# Patient Record
Sex: FEMALE | Race: WHITE | NOT HISPANIC OR LATINO | Employment: PART TIME
[De-identification: names, ages, dates, MRNs, and addresses within clinical notes are randomized per-mention and may not be internally consistent; named-entity substitution may affect disease eponyms.]

---

## 2017-03-07 ENCOUNTER — RECORDS - HEALTHEAST (OUTPATIENT)
Dept: ADMINISTRATIVE | Facility: OTHER | Age: 62
End: 2017-03-07

## 2017-03-08 ENCOUNTER — COMMUNICATION - HEALTHEAST (OUTPATIENT)
Dept: FAMILY MEDICINE | Facility: CLINIC | Age: 62
End: 2017-03-08

## 2017-03-10 ENCOUNTER — OFFICE VISIT - HEALTHEAST (OUTPATIENT)
Dept: FAMILY MEDICINE | Facility: CLINIC | Age: 62
End: 2017-03-10

## 2017-03-10 DIAGNOSIS — Z96.7 FIXATION HARDWARE IN LEG: ICD-10-CM

## 2017-03-10 DIAGNOSIS — K12.0 APHTHOUS ULCER: ICD-10-CM

## 2017-03-10 DIAGNOSIS — S82.409A: ICD-10-CM

## 2017-03-10 DIAGNOSIS — L30.9 DERMATITIS: ICD-10-CM

## 2017-03-10 DIAGNOSIS — Z01.818 PRE-OP EXAMINATION: ICD-10-CM

## 2017-03-10 DIAGNOSIS — Z12.11 SCREEN FOR COLON CANCER: ICD-10-CM

## 2017-03-10 DIAGNOSIS — S82.209A: ICD-10-CM

## 2017-03-10 DIAGNOSIS — Z12.31 VISIT FOR SCREENING MAMMOGRAM: ICD-10-CM

## 2017-03-10 LAB
ATRIAL RATE - MUSE: 72 BPM
DIASTOLIC BLOOD PRESSURE - MUSE: NORMAL MMHG
INTERPRETATION ECG - MUSE: NORMAL
P AXIS - MUSE: 49 DEGREES
PR INTERVAL - MUSE: 152 MS
QRS DURATION - MUSE: 82 MS
QT - MUSE: 402 MS
QTC - MUSE: 440 MS
R AXIS - MUSE: 54 DEGREES
SYSTOLIC BLOOD PRESSURE - MUSE: NORMAL MMHG
T AXIS - MUSE: 63 DEGREES
VENTRICULAR RATE- MUSE: 72 BPM

## 2017-03-10 RX ORDER — IBUPROFEN 200 MG
200 TABLET ORAL EVERY 6 HOURS PRN
Status: SHIPPED | COMMUNITY
Start: 2017-03-10 | End: 2024-02-07

## 2017-03-14 ENCOUNTER — COMMUNICATION - HEALTHEAST (OUTPATIENT)
Dept: FAMILY MEDICINE | Facility: CLINIC | Age: 62
End: 2017-03-14

## 2017-03-15 ENCOUNTER — RECORDS - HEALTHEAST (OUTPATIENT)
Dept: ADMINISTRATIVE | Facility: OTHER | Age: 62
End: 2017-03-15

## 2017-03-22 ENCOUNTER — RECORDS - HEALTHEAST (OUTPATIENT)
Dept: ADMINISTRATIVE | Facility: OTHER | Age: 62
End: 2017-03-22

## 2017-04-04 ENCOUNTER — COMMUNICATION - HEALTHEAST (OUTPATIENT)
Dept: FAMILY MEDICINE | Facility: CLINIC | Age: 62
End: 2017-04-04

## 2017-05-18 ENCOUNTER — RECORDS - HEALTHEAST (OUTPATIENT)
Dept: ADMINISTRATIVE | Facility: OTHER | Age: 62
End: 2017-05-18

## 2017-06-27 ENCOUNTER — TELEPHONE (OUTPATIENT)
Dept: GASTROENTEROLOGY | Facility: CLINIC | Age: 62
End: 2017-06-27

## 2017-07-13 ENCOUNTER — COMMUNICATION - HEALTHEAST (OUTPATIENT)
Dept: FAMILY MEDICINE | Facility: CLINIC | Age: 62
End: 2017-07-13

## 2017-08-22 ENCOUNTER — COMMUNICATION - HEALTHEAST (OUTPATIENT)
Dept: FAMILY MEDICINE | Facility: CLINIC | Age: 62
End: 2017-08-22

## 2018-01-31 ENCOUNTER — COMMUNICATION - HEALTHEAST (OUTPATIENT)
Dept: FAMILY MEDICINE | Facility: CLINIC | Age: 63
End: 2018-01-31

## 2019-08-19 ENCOUNTER — COMMUNICATION - HEALTHEAST (OUTPATIENT)
Dept: FAMILY MEDICINE | Facility: CLINIC | Age: 64
End: 2019-08-19

## 2019-08-19 ENCOUNTER — OFFICE VISIT - HEALTHEAST (OUTPATIENT)
Dept: FAMILY MEDICINE | Facility: CLINIC | Age: 64
End: 2019-08-19

## 2019-08-19 DIAGNOSIS — I73.9 CLAUDICATION (H): ICD-10-CM

## 2019-08-19 DIAGNOSIS — Z00.00 WELL ADULT EXAM: ICD-10-CM

## 2019-08-19 DIAGNOSIS — Z82.49 FAMILY HISTORY OF ISCHEMIC HEART DISEASE: ICD-10-CM

## 2019-08-19 DIAGNOSIS — Z12.31 VISIT FOR SCREENING MAMMOGRAM: ICD-10-CM

## 2019-08-19 DIAGNOSIS — Z12.2 ENCOUNTER FOR SCREENING FOR LUNG CANCER: ICD-10-CM

## 2019-08-19 DIAGNOSIS — Z12.11 SCREEN FOR COLON CANCER: ICD-10-CM

## 2019-08-19 DIAGNOSIS — Z72.0 TOBACCO USE: ICD-10-CM

## 2019-08-19 DIAGNOSIS — L40.3 PUSTULOSIS PALMARIS ET PLANTARIS: ICD-10-CM

## 2019-08-19 LAB
ALBUMIN SERPL-MCNC: 4.4 G/DL (ref 3.5–5)
ALBUMIN UR-MCNC: NEGATIVE MG/DL
ALP SERPL-CCNC: 86 U/L (ref 45–120)
ALT SERPL W P-5'-P-CCNC: 48 U/L (ref 0–45)
ANION GAP SERPL CALCULATED.3IONS-SCNC: 11 MMOL/L (ref 5–18)
APPEARANCE UR: CLEAR
AST SERPL W P-5'-P-CCNC: 42 U/L (ref 0–40)
BACTERIA #/AREA URNS HPF: ABNORMAL HPF
BASOPHILS # BLD AUTO: 0.1 THOU/UL (ref 0–0.2)
BASOPHILS NFR BLD AUTO: 1 % (ref 0–2)
BILIRUB SERPL-MCNC: 0.5 MG/DL (ref 0–1)
BILIRUB UR QL STRIP: NEGATIVE
BUN SERPL-MCNC: 13 MG/DL (ref 8–22)
CALCIUM SERPL-MCNC: 10.4 MG/DL (ref 8.5–10.5)
CHLORIDE BLD-SCNC: 105 MMOL/L (ref 98–107)
CO2 SERPL-SCNC: 24 MMOL/L (ref 22–31)
COLOR UR AUTO: YELLOW
CREAT SERPL-MCNC: 0.81 MG/DL (ref 0.6–1.1)
CRP SERPL HS-MCNC: 3.8 MG/L (ref 0–3)
EOSINOPHIL # BLD AUTO: 0.3 THOU/UL (ref 0–0.4)
EOSINOPHIL NFR BLD AUTO: 3 % (ref 0–6)
ERYTHROCYTE [DISTWIDTH] IN BLOOD BY AUTOMATED COUNT: 12.7 % (ref 11–14.5)
GFR SERPL CREATININE-BSD FRML MDRD: >60 ML/MIN/1.73M2
GLUCOSE BLD-MCNC: 102 MG/DL (ref 70–125)
GLUCOSE UR STRIP-MCNC: NEGATIVE MG/DL
HCT VFR BLD AUTO: 48.4 % (ref 35–47)
HGB BLD-MCNC: 15.6 G/DL (ref 12–16)
HGB UR QL STRIP: ABNORMAL
KETONES UR STRIP-MCNC: NEGATIVE MG/DL
LEUKOCYTE ESTERASE UR QL STRIP: NEGATIVE
LYMPHOCYTES # BLD AUTO: 2.3 THOU/UL (ref 0.8–4.4)
LYMPHOCYTES NFR BLD AUTO: 23 % (ref 20–40)
MCH RBC QN AUTO: 30.3 PG (ref 27–34)
MCHC RBC AUTO-ENTMCNC: 32.2 G/DL (ref 32–36)
MCV RBC AUTO: 94 FL (ref 80–100)
MONOCYTES # BLD AUTO: 0.6 THOU/UL (ref 0–0.9)
MONOCYTES NFR BLD AUTO: 6 % (ref 2–10)
MUCOUS THREADS #/AREA URNS LPF: ABNORMAL LPF
NEUTROPHILS # BLD AUTO: 6.7 THOU/UL (ref 2–7.7)
NEUTROPHILS NFR BLD AUTO: 67 % (ref 50–70)
NITRATE UR QL: NEGATIVE
PH UR STRIP: 5 [PH] (ref 5–8)
PLATELET # BLD AUTO: 296 THOU/UL (ref 140–440)
PMV BLD AUTO: 10.5 FL (ref 8.5–12.5)
POTASSIUM BLD-SCNC: 4.3 MMOL/L (ref 3.5–5)
PROT SERPL-MCNC: 7.7 G/DL (ref 6–8)
RBC # BLD AUTO: 5.15 MILL/UL (ref 3.8–5.4)
RBC #/AREA URNS AUTO: ABNORMAL HPF
SODIUM SERPL-SCNC: 140 MMOL/L (ref 136–145)
SP GR UR STRIP: 1.02 (ref 1–1.03)
SQUAMOUS #/AREA URNS AUTO: ABNORMAL LPF
TSH SERPL DL<=0.005 MIU/L-ACNC: 1.73 UIU/ML (ref 0.3–5)
UROBILINOGEN UR STRIP-ACNC: ABNORMAL
WBC #/AREA URNS AUTO: ABNORMAL HPF
WBC: 10.1 THOU/UL (ref 4–11)

## 2019-08-19 ASSESSMENT — MIFFLIN-ST. JEOR: SCORE: 1224.93

## 2019-08-20 LAB
25(OH)D3 SERPL-MCNC: 24.7 NG/ML (ref 30–80)
FASTING STATUS PATIENT QL REPORTED: YES
HOMOCYSTEINE PLASMA - HISTORICAL: 10 UMOL/L (ref 0–13)

## 2019-08-21 ENCOUNTER — HOSPITAL ENCOUNTER (OUTPATIENT)
Dept: ULTRASOUND IMAGING | Facility: CLINIC | Age: 64
Discharge: HOME | End: 2019-08-21
Attending: FAMILY MEDICINE

## 2019-08-21 DIAGNOSIS — I73.9 CLAUDICATION (H): ICD-10-CM

## 2019-08-22 LAB
CHOLEST SERPL-MCNC: 242 MG/DL
HDL SERPL QN: 8.3 NM
HDL SERPL-SCNC: 37.6 UMOL/L
HDLC SERPL-MCNC: 44 MG/DL (ref 40–59)
HLD.LARGE SERPL-SCNC: <2.8 UMOL/L
LDL SERPL QN: 20.8 NM
LDL SERPL-SCNC: 2176 NMOL/L
LDL SMALL SERPL-SCNC: 889 NMOL/L
LDLC SERPL CALC-MCNC: 160 MG/DL
LIPOPROTEIN (A) - HISTORICAL: <2 MG/DL (ref 0–30)
PATHOLOGY STUDY: ABNORMAL
TRIGL SERPL-MCNC: 192 MG/DL (ref 30–149)
VLDL LARGE SERPL-SCNC: 11.2 NMOL/L
VLDL SERPL QN: 52.7 NM

## 2019-08-23 ENCOUNTER — AMBULATORY - HEALTHEAST (OUTPATIENT)
Dept: FAMILY MEDICINE | Facility: CLINIC | Age: 64
End: 2019-08-23

## 2019-08-23 ENCOUNTER — COMMUNICATION - HEALTHEAST (OUTPATIENT)
Dept: FAMILY MEDICINE | Facility: CLINIC | Age: 64
End: 2019-08-23

## 2019-08-23 DIAGNOSIS — R31.29 MICROHEMATURIA: ICD-10-CM

## 2019-08-23 DIAGNOSIS — I73.9 CLAUDICATION (H): ICD-10-CM

## 2019-08-23 DIAGNOSIS — E78.5 DYSLIPIDEMIA: ICD-10-CM

## 2019-08-23 DIAGNOSIS — Z72.0 TOBACCO USE: ICD-10-CM

## 2019-08-26 ENCOUNTER — COMMUNICATION - HEALTHEAST (OUTPATIENT)
Dept: FAMILY MEDICINE | Facility: CLINIC | Age: 64
End: 2019-08-26

## 2019-08-27 ENCOUNTER — HOSPITAL ENCOUNTER (OUTPATIENT)
Dept: CT IMAGING | Facility: HOSPITAL | Age: 64
Discharge: HOME | End: 2019-08-27
Attending: FAMILY MEDICINE

## 2019-08-27 ENCOUNTER — COMMUNICATION - HEALTHEAST (OUTPATIENT)
Dept: FAMILY MEDICINE | Facility: CLINIC | Age: 64
End: 2019-08-27

## 2019-08-27 DIAGNOSIS — Z12.2 ENCOUNTER FOR SCREENING FOR LUNG CANCER: ICD-10-CM

## 2019-08-27 DIAGNOSIS — Z72.0 TOBACCO USE: ICD-10-CM

## 2019-08-28 ENCOUNTER — RECORDS - HEALTHEAST (OUTPATIENT)
Dept: ADMINISTRATIVE | Facility: OTHER | Age: 64
End: 2019-08-28

## 2019-08-28 ENCOUNTER — COMMUNICATION - HEALTHEAST (OUTPATIENT)
Dept: FAMILY MEDICINE | Facility: CLINIC | Age: 64
End: 2019-08-28

## 2019-09-04 ENCOUNTER — OFFICE VISIT - HEALTHEAST (OUTPATIENT)
Dept: VASCULAR SURGERY | Facility: CLINIC | Age: 64
End: 2019-09-04

## 2019-09-04 ENCOUNTER — RECORDS - HEALTHEAST (OUTPATIENT)
Dept: VASCULAR ULTRASOUND | Facility: CLINIC | Age: 64
End: 2019-09-04

## 2019-09-04 ENCOUNTER — COMMUNICATION - HEALTHEAST (OUTPATIENT)
Dept: VASCULAR SURGERY | Facility: CLINIC | Age: 64
End: 2019-09-04

## 2019-09-04 DIAGNOSIS — Z72.0 TOBACCO USE: ICD-10-CM

## 2019-09-04 DIAGNOSIS — I73.9 CLAUDICATION (H): ICD-10-CM

## 2019-09-04 DIAGNOSIS — I73.9 PERIPHERAL VASCULAR DISEASE, UNSPECIFIED (H): ICD-10-CM

## 2019-09-13 ENCOUNTER — COMMUNICATION - HEALTHEAST (OUTPATIENT)
Dept: VASCULAR SURGERY | Facility: CLINIC | Age: 64
End: 2019-09-13

## 2019-09-13 ENCOUNTER — RECORDS - HEALTHEAST (OUTPATIENT)
Dept: ADMINISTRATIVE | Facility: OTHER | Age: 64
End: 2019-09-13

## 2019-09-13 DIAGNOSIS — I73.9 CLAUDICATION (H): ICD-10-CM

## 2019-09-13 RX ORDER — CLOPIDOGREL BISULFATE 75 MG/1
75 TABLET ORAL DAILY
Qty: 90 TABLET | Refills: 0 | Status: SHIPPED | OUTPATIENT
Start: 2019-09-13 | End: 2022-04-01

## 2019-09-16 ENCOUNTER — HOSPITAL ENCOUNTER (OUTPATIENT)
Dept: MAMMOGRAPHY | Facility: CLINIC | Age: 64
Discharge: HOME | End: 2019-09-16
Attending: FAMILY MEDICINE

## 2019-09-16 DIAGNOSIS — Z12.31 VISIT FOR SCREENING MAMMOGRAM: ICD-10-CM

## 2019-09-17 ENCOUNTER — HOSPITAL ENCOUNTER (OUTPATIENT)
Dept: INTERVENTIONAL RADIOLOGY/VASCULAR | Facility: CLINIC | Age: 64
Discharge: HOME | End: 2019-09-17
Attending: SURGERY | Admitting: SURGERY

## 2019-09-17 ENCOUNTER — HOSPITAL ENCOUNTER (OUTPATIENT)
Dept: CT IMAGING | Facility: CLINIC | Age: 64
Discharge: HOME | End: 2019-09-17
Attending: SURGERY

## 2019-09-17 DIAGNOSIS — I73.9 CLAUDICATION (H): ICD-10-CM

## 2019-09-17 DIAGNOSIS — R10.31 RIGHT INGUINAL PAIN: ICD-10-CM

## 2019-09-17 LAB
ACT BLD: 138 SECONDS (ref 105–167)
ACT BLD: 251 SECONDS (ref 105–167)
ACT BLD: 284 SECONDS (ref 105–167)
CREAT SERPL-MCNC: 0.82 MG/DL (ref 0.6–1.1)
GFR SERPL CREATININE-BSD FRML MDRD: >60 ML/MIN/1.73M2
HGB BLD-MCNC: 14.3 G/DL (ref 12–16)
INR PPP: 0.84 (ref 0.9–1.1)
PLATELET # BLD AUTO: 280 THOU/UL (ref 140–440)
POTASSIUM BLD-SCNC: 4.4 MMOL/L (ref 3.5–5)

## 2019-09-17 ASSESSMENT — MIFFLIN-ST. JEOR: SCORE: 1216.99

## 2019-09-18 ENCOUNTER — COMMUNICATION - HEALTHEAST (OUTPATIENT)
Dept: FAMILY MEDICINE | Facility: CLINIC | Age: 64
End: 2019-09-18

## 2019-09-20 ENCOUNTER — COMMUNICATION - HEALTHEAST (OUTPATIENT)
Dept: VASCULAR SURGERY | Facility: CLINIC | Age: 64
End: 2019-09-20

## 2019-09-23 ENCOUNTER — HOSPITAL ENCOUNTER (OUTPATIENT)
Dept: CT IMAGING | Facility: HOSPITAL | Age: 64
Discharge: HOME | End: 2019-09-23
Attending: UROLOGY

## 2019-09-23 ENCOUNTER — COMMUNICATION - HEALTHEAST (OUTPATIENT)
Dept: INTERVENTIONAL RADIOLOGY/VASCULAR | Facility: CLINIC | Age: 64
End: 2019-09-23

## 2019-09-23 DIAGNOSIS — R31.29 HEMATURIA, MICROSCOPIC: ICD-10-CM

## 2019-09-30 ENCOUNTER — RECORDS - HEALTHEAST (OUTPATIENT)
Dept: ADMINISTRATIVE | Facility: OTHER | Age: 64
End: 2019-09-30

## 2019-10-01 ENCOUNTER — RECORDS - HEALTHEAST (OUTPATIENT)
Dept: VASCULAR ULTRASOUND | Facility: CLINIC | Age: 64
End: 2019-10-01

## 2019-10-01 DIAGNOSIS — I73.9 PERIPHERAL VASCULAR DISEASE, UNSPECIFIED (H): ICD-10-CM

## 2019-10-02 ENCOUNTER — OFFICE VISIT - HEALTHEAST (OUTPATIENT)
Dept: VASCULAR SURGERY | Facility: CLINIC | Age: 64
End: 2019-10-02

## 2019-10-02 DIAGNOSIS — I73.9 CLAUDICATION (H): ICD-10-CM

## 2019-10-02 ASSESSMENT — MIFFLIN-ST. JEOR: SCORE: 1215.86

## 2019-10-31 ENCOUNTER — RECORDS - HEALTHEAST (OUTPATIENT)
Dept: ADMINISTRATIVE | Facility: OTHER | Age: 64
End: 2019-10-31

## 2019-11-22 ENCOUNTER — OFFICE VISIT - HEALTHEAST (OUTPATIENT)
Dept: FAMILY MEDICINE | Facility: CLINIC | Age: 64
End: 2019-11-22

## 2019-11-22 DIAGNOSIS — I70.222 ATHEROSCLEROSIS OF NATIVE ARTERY OF LEFT LOWER EXTREMITY WITH REST PAIN (H): ICD-10-CM

## 2019-11-22 DIAGNOSIS — J41.8 MIXED SIMPLE AND MUCOPURULENT CHRONIC BRONCHITIS (H): ICD-10-CM

## 2019-11-22 DIAGNOSIS — R09.89 BRUIT OF RIGHT CAROTID ARTERY: ICD-10-CM

## 2019-11-22 DIAGNOSIS — I25.10 CORONARY ARTERY CALCIFICATION SEEN ON CAT SCAN: ICD-10-CM

## 2019-11-22 DIAGNOSIS — I73.9 CLAUDICATION (H): ICD-10-CM

## 2019-11-22 DIAGNOSIS — E78.5 DYSLIPIDEMIA: ICD-10-CM

## 2019-11-22 DIAGNOSIS — R79.82 ELEVATED C-REACTIVE PROTEIN (CRP): ICD-10-CM

## 2019-11-22 DIAGNOSIS — L40.8 LOCALIZED PUSTULAR PSORIASIS: ICD-10-CM

## 2019-11-22 DIAGNOSIS — F17.200 SMOKING ADDICTION: ICD-10-CM

## 2019-11-22 ASSESSMENT — MIFFLIN-ST. JEOR: SCORE: 1219.26

## 2019-11-25 ENCOUNTER — HOSPITAL ENCOUNTER (OUTPATIENT)
Dept: ULTRASOUND IMAGING | Facility: HOSPITAL | Age: 64
Discharge: HOME | End: 2019-11-25
Attending: FAMILY MEDICINE

## 2019-11-25 DIAGNOSIS — R09.89 BRUIT OF RIGHT CAROTID ARTERY: ICD-10-CM

## 2019-11-26 ENCOUNTER — AMBULATORY - HEALTHEAST (OUTPATIENT)
Dept: LAB | Facility: CLINIC | Age: 64
End: 2019-11-26

## 2019-11-26 DIAGNOSIS — E78.5 DYSLIPIDEMIA: ICD-10-CM

## 2019-11-26 DIAGNOSIS — I70.222 ATHEROSCLEROSIS OF NATIVE ARTERY OF LEFT LOWER EXTREMITY WITH REST PAIN (H): ICD-10-CM

## 2019-11-26 LAB
ALBUMIN SERPL-MCNC: 4.6 G/DL (ref 3.5–5)
ALP SERPL-CCNC: 74 U/L (ref 45–120)
ALT SERPL W P-5'-P-CCNC: 36 U/L (ref 0–45)
ANION GAP SERPL CALCULATED.3IONS-SCNC: 10 MMOL/L (ref 5–18)
AST SERPL W P-5'-P-CCNC: 35 U/L (ref 0–40)
BILIRUB SERPL-MCNC: 0.5 MG/DL (ref 0–1)
BUN SERPL-MCNC: 15 MG/DL (ref 8–22)
CALCIUM SERPL-MCNC: 9.9 MG/DL (ref 8.5–10.5)
CHLORIDE BLD-SCNC: 103 MMOL/L (ref 98–107)
CHOLEST SERPL-MCNC: 142 MG/DL
CO2 SERPL-SCNC: 26 MMOL/L (ref 22–31)
CREAT SERPL-MCNC: 0.86 MG/DL (ref 0.6–1.1)
CRP SERPL HS-MCNC: 1.6 MG/L (ref 0–3)
FASTING STATUS PATIENT QL REPORTED: YES
GFR SERPL CREATININE-BSD FRML MDRD: >60 ML/MIN/1.73M2
GLUCOSE BLD-MCNC: 80 MG/DL (ref 70–125)
HDLC SERPL-MCNC: 51 MG/DL
LDLC SERPL CALC-MCNC: 70 MG/DL
POTASSIUM BLD-SCNC: 4.2 MMOL/L (ref 3.5–5)
PROT SERPL-MCNC: 7.5 G/DL (ref 6–8)
SODIUM SERPL-SCNC: 139 MMOL/L (ref 136–145)
TRIGL SERPL-MCNC: 104 MG/DL

## 2019-11-27 ENCOUNTER — COMMUNICATION - HEALTHEAST (OUTPATIENT)
Dept: FAMILY MEDICINE | Facility: CLINIC | Age: 64
End: 2019-11-27

## 2019-12-17 ENCOUNTER — COMMUNICATION - HEALTHEAST (OUTPATIENT)
Dept: SCHEDULING | Facility: CLINIC | Age: 64
End: 2019-12-17

## 2019-12-26 ENCOUNTER — AMBULATORY - HEALTHEAST (OUTPATIENT)
Dept: FAMILY MEDICINE | Facility: CLINIC | Age: 64
End: 2019-12-26

## 2019-12-26 DIAGNOSIS — E78.5 DYSLIPIDEMIA: ICD-10-CM

## 2019-12-26 DIAGNOSIS — R79.82 ELEVATED C-REACTIVE PROTEIN (CRP): ICD-10-CM

## 2019-12-26 DIAGNOSIS — I70.222 ATHEROSCLEROSIS OF NATIVE ARTERY OF LEFT LOWER EXTREMITY WITH REST PAIN (H): ICD-10-CM

## 2019-12-26 DIAGNOSIS — R06.09 OTHER FORM OF DYSPNEA: ICD-10-CM

## 2019-12-26 DIAGNOSIS — F17.200 SMOKING ADDICTION: ICD-10-CM

## 2019-12-30 ENCOUNTER — HOSPITAL ENCOUNTER (OUTPATIENT)
Dept: NUCLEAR MEDICINE | Facility: CLINIC | Age: 64
Discharge: HOME | End: 2019-12-30
Attending: FAMILY MEDICINE

## 2019-12-30 ENCOUNTER — HOSPITAL ENCOUNTER (OUTPATIENT)
Dept: CARDIOLOGY | Facility: CLINIC | Age: 64
Discharge: HOME | End: 2019-12-30
Attending: FAMILY MEDICINE

## 2019-12-30 ENCOUNTER — COMMUNICATION - HEALTHEAST (OUTPATIENT)
Dept: FAMILY MEDICINE | Facility: CLINIC | Age: 64
End: 2019-12-30

## 2019-12-30 DIAGNOSIS — F17.200 SMOKING ADDICTION: ICD-10-CM

## 2019-12-30 DIAGNOSIS — R06.09 OTHER FORM OF DYSPNEA: ICD-10-CM

## 2019-12-30 DIAGNOSIS — E78.5 DYSLIPIDEMIA: ICD-10-CM

## 2019-12-30 DIAGNOSIS — R79.82 ELEVATED C-REACTIVE PROTEIN (CRP): ICD-10-CM

## 2019-12-30 DIAGNOSIS — I70.222 ATHEROSCLEROSIS OF NATIVE ARTERY OF LEFT LOWER EXTREMITY WITH REST PAIN (H): ICD-10-CM

## 2019-12-30 LAB
CV STRESS CURRENT BP HE: NORMAL
CV STRESS CURRENT HR HE: 106
CV STRESS CURRENT HR HE: 107
CV STRESS CURRENT HR HE: 112
CV STRESS CURRENT HR HE: 117
CV STRESS CURRENT HR HE: 73
CV STRESS CURRENT HR HE: 73
CV STRESS CURRENT HR HE: 79
CV STRESS CURRENT HR HE: 80
CV STRESS CURRENT HR HE: 81
CV STRESS CURRENT HR HE: 87
CV STRESS CURRENT HR HE: 91
CV STRESS CURRENT HR HE: 95
CV STRESS CURRENT HR HE: 96
CV STRESS CURRENT HR HE: 99
CV STRESS CURRENT HR HE: 99
CV STRESS DEVIATION TIME HE: NORMAL
CV STRESS ECHO PERCENT HR HE: NORMAL
CV STRESS EXERCISE STAGE HE: NORMAL
CV STRESS FINAL RESTING BP HE: NORMAL
CV STRESS FINAL RESTING HR HE: 91
CV STRESS MAX HR HE: 117
CV STRESS MAX TREADMILL GRADE HE: 0
CV STRESS MAX TREADMILL SPEED HE: 0
CV STRESS PEAK DIA BP HE: NORMAL
CV STRESS PEAK SYS BP HE: NORMAL
CV STRESS PHASE HE: NORMAL
CV STRESS PROTOCOL HE: NORMAL
CV STRESS RESTING PT POSITION HE: NORMAL
CV STRESS ST DEVIATION AMOUNT HE: NORMAL
CV STRESS ST DEVIATION ELEVATION HE: NORMAL
CV STRESS ST EVELATION AMOUNT HE: NORMAL
CV STRESS TEST TYPE HE: NORMAL
CV STRESS TOTAL STAGE TIME MIN 1 HE: NORMAL
NUC STRESS EJECTION FRACTION: 54 %
RATE PRESSURE PRODUCT: NORMAL
STRESS ECHO BASELINE DIASTOLIC HE: 93
STRESS ECHO BASELINE HR: 74
STRESS ECHO BASELINE SYSTOLIC BP: 148
STRESS ECHO CALCULATED PERCENT HR: 74 %
STRESS ECHO LAST STRESS DIASTOLIC BP: 82
STRESS ECHO LAST STRESS HR: 99
STRESS ECHO LAST STRESS SYSTOLIC BP: 185
STRESS ECHO TARGET HR: 158

## 2020-03-05 ENCOUNTER — COMMUNICATION - HEALTHEAST (OUTPATIENT)
Dept: VASCULAR SURGERY | Facility: CLINIC | Age: 65
End: 2020-03-05

## 2020-09-06 ENCOUNTER — COMMUNICATION - HEALTHEAST (OUTPATIENT)
Dept: FAMILY MEDICINE | Facility: CLINIC | Age: 65
End: 2020-09-06

## 2020-09-06 DIAGNOSIS — E78.5 DYSLIPIDEMIA: ICD-10-CM

## 2020-09-06 DIAGNOSIS — Z72.0 TOBACCO USE: ICD-10-CM

## 2020-09-06 DIAGNOSIS — I73.9 CLAUDICATION (H): ICD-10-CM

## 2020-09-10 ENCOUNTER — COMMUNICATION - HEALTHEAST (OUTPATIENT)
Dept: PULMONOLOGY | Facility: OTHER | Age: 65
End: 2020-09-10

## 2020-10-14 ENCOUNTER — COMMUNICATION - HEALTHEAST (OUTPATIENT)
Dept: PULMONOLOGY | Facility: OTHER | Age: 65
End: 2020-10-14

## 2021-01-17 ENCOUNTER — COMMUNICATION - HEALTHEAST (OUTPATIENT)
Dept: FAMILY MEDICINE | Facility: CLINIC | Age: 66
End: 2021-01-17

## 2021-01-17 DIAGNOSIS — I73.9 CLAUDICATION (H): ICD-10-CM

## 2021-01-17 DIAGNOSIS — Z72.0 TOBACCO USE: ICD-10-CM

## 2021-01-17 DIAGNOSIS — E78.5 DYSLIPIDEMIA: ICD-10-CM

## 2021-05-16 ENCOUNTER — COMMUNICATION - HEALTHEAST (OUTPATIENT)
Dept: FAMILY MEDICINE | Facility: CLINIC | Age: 66
End: 2021-05-16

## 2021-05-16 DIAGNOSIS — Z72.0 TOBACCO USE: ICD-10-CM

## 2021-05-16 DIAGNOSIS — E78.5 DYSLIPIDEMIA: ICD-10-CM

## 2021-05-16 DIAGNOSIS — I73.9 CLAUDICATION (H): ICD-10-CM

## 2021-05-16 RX ORDER — ROSUVASTATIN CALCIUM 10 MG/1
TABLET, COATED ORAL
Qty: 30 TABLET | Refills: 3 | Status: SHIPPED | OUTPATIENT
Start: 2021-05-16 | End: 2022-04-01

## 2021-05-25 ENCOUNTER — RECORDS - HEALTHEAST (OUTPATIENT)
Dept: ADMINISTRATIVE | Facility: CLINIC | Age: 66
End: 2021-05-25

## 2021-05-26 ENCOUNTER — RECORDS - HEALTHEAST (OUTPATIENT)
Dept: ADMINISTRATIVE | Facility: CLINIC | Age: 66
End: 2021-05-26

## 2021-05-28 ENCOUNTER — RECORDS - HEALTHEAST (OUTPATIENT)
Dept: ADMINISTRATIVE | Facility: CLINIC | Age: 66
End: 2021-05-28

## 2021-05-30 ENCOUNTER — HEALTH MAINTENANCE LETTER (OUTPATIENT)
Age: 66
End: 2021-05-30

## 2021-05-30 ENCOUNTER — RECORDS - HEALTHEAST (OUTPATIENT)
Dept: ADMINISTRATIVE | Facility: CLINIC | Age: 66
End: 2021-05-30

## 2021-05-30 VITALS — BODY MASS INDEX: 25.88 KG/M2 | WEIGHT: 150.75 LBS

## 2021-05-31 NOTE — TELEPHONE ENCOUNTER
"8-19-19  Marysol uLu, say there is a order for Amirah that was out in for \"AMB REFERRAL FOR LUNG CANCER SCREENING\" Radiology wont schedule with the way it was put in, can you put it in for \"CT low dose lung screening\"  Thanks  Carmela"

## 2021-05-31 NOTE — PROGRESS NOTES
ASSESSMENT & PLAN    No problem-specific Assessment & Plan notes found for this encounter.      Amirah was seen today for annual exam.    Diagnoses and all orders for this visit:    Visit for screening mammogram  -     Mammo Screening Bilateral; Future    Screen for colon cancer  -     Ambulatory referral for Colonoscopy    Claudication (H)  -     US Ankle Brachial Indices; Future    Tobacco use  -     AMB LUNG CANCER SCREENING REFERRAL    Encounter for screening for lung cancer  -     AMB LUNG CANCER SCREENING REFERRAL    Pustulosis palmaris et plantaris  -     Ambulatory referral to Dermatology  -     triamcinolone (KENALOG) 0.5 % ointment; Apply twice daily to the affected area palms and soles and elbows onto resolved and use sparingly    Well adult exam  -     LipoFit by NMR  -     Comprehensive Metabolic Panel  -     HM1(CBC and Differential)  -     C -Reactive Protein, High Sensitivity  -     Vitamin D, Total (25-Hydroxy)  -     Urinalysis-UC if Indicated  -     Thyroid Cascade  -     Lipoprotein a (LPPA)  -     Homocysteine  -     HM1 (CBC with Diff)    Family history of ischemic heart disease  -     LipoFit by NMR  -     Lipoprotein a (LPPA)  -     Homocysteine        Patient Instructions   Accomplish your mammogram    Accomplish your colonoscopy    We can follow-up on your circulation please do the ankle-brachial indices first if this is normal then I will have you see orthopedics otherwise we will work-up vascular disease    See the dermatologist I think likely have psoriasis  Use the triamcinolone for palms and soles as well as elbows again get dermatology's input    Eliminate sugar  Increase walking  For the time being take 81 mg baby aspirin daily            Return in about 3 months (around 11/19/2019).       Little interest or pleasure in doing things: Several days  Feeling down, depressed, or hopeless: Not at all    CHIEF COMPLAINT: Amirah Shea had concerns including Annual Exam (left  lower pain since fracture, fasting labs).    [place]opa: 1.............. had concerns including Annual Exam (left lower pain since fracture, fasting labs).    1. Visit for screening mammogram    2. Screen for colon cancer    3. Claudication (H)    4. Tobacco use    5. Encounter for screening for lung cancer    6. Pustulosis palmaris et plantaris    7. Well adult exam    8. Family history of ischemic heart disease          CC:             Why are you here today?                              Annual Exam    Describes pain left leg with walking gets better when she stops about 2-3 blocks pins-and-needles left fourth fifth toe she is had a surgery and hardware in the left side back in 2015 saw Dr. Zepeda [facility]    Due for colonoscopy  Greater than 30-pack-year history of smoking    Candidate for lung cancer screening    Rash palms soles as well as elbows pustular lesions negative for fungus in the past no joint issues  Some onychomycosis changes of the great toe            Any other Problems in order of Priority:        SUBJECTIVE:  Amirah Shea is a 63 y.o. female    No past medical history on file.  Past Surgical History:   Procedure Laterality Date     HYSTERECTOMY       OOPHORECTOMY       FL APPENDECTOMY      Description: Appendectomy;  Recorded: 01/08/2010;     FL OPEN TREATMENT FRACTURE DISTAL TIBIA ONLY Left 11/27/2015    Procedure: OPEN REDUCTION INTERNAL FIXATION LEFT TIBIAL FIBULAR FRACTURE;  Surgeon: Roberto Zepeda MD;  Location: [facility];  Service: Orthopedics     FL PART REMOVAL COLON W ANASTOMOSIS      Description: Partial Colectomy;  Recorded: 03/11/2014;     FL REMOVAL GALLBLADDER      Description: Cholecystectomy;  Recorded: 01/08/2010;     FL TOTAL ABDOM HYSTERECTOMY      Description: Total Abdominal Hysterectomy;  Recorded: 01/08/2010;  Comments: with BSO     Patient has no known allergies.  Current Outpatient Medications   Medication Sig Dispense Refill     ibuprofen  (ADVIL,MOTRIN) 200 MG tablet Take 200 mg by mouth every 6 (six) hours as needed for pain.       triamcinolone (KENALOG) 0.5 % ointment Apply twice daily to the affected area palms and soles and elbows onto resolved and use sparingly 30 g 3     No current facility-administered medications for this visit.      Family History   Problem Relation Age of Onset     No Medical Problems Mother      Cancer Father      Heart disease Sister      No Medical Problems Daughter      No Medical Problems Maternal Grandmother      No Medical Problems Maternal Grandfather      No Medical Problems Paternal Grandmother      No Medical Problems Paternal Grandfather      Ovarian cancer Maternal Aunt      No Medical Problems Paternal Aunt      Cancer Brother      Cancer Maternal Aunt         lung     Cancer Maternal Aunt         lung     Heart disease Sister      Cancer Sister         Rectal 65      Clotting disorder Sister      Heart disease Brother      BRCA 1/2 Neg Hx      Breast cancer Neg Hx      Endometrial cancer Neg Hx      Social History     Socioeconomic History     Marital status:      Spouse name: None     Number of children: None     Years of education: None     Highest education level: None   Occupational History     None   Social Needs     Financial resource strain: None     Food insecurity:     Worry: None     Inability: None     Transportation needs:     Medical: None     Non-medical: None   Tobacco Use     Smoking status: Current Some Day Smoker     Packs/day: 1.00     Types: Cigarettes     Smokeless tobacco: Never Used   Substance and Sexual Activity     Alcohol use: Yes     Comment: 3-4 week     Drug use: No     Sexual activity: None   Lifestyle     Physical activity:     Days per week: None     Minutes per session: None     Stress: None   Relationships     Social connections:     Talks on phone: None     Gets together: None     Attends [employer] service: None     Active member of club or organization: None      Attends meetings of clubs or organizations: None     Relationship status: None     Intimate partner violence:     Fear of current or ex partner: None     Emotionally abused: None     Physically abused: None     Forced sexual activity: None   Other Topics Concern     None   Social History Narrative    11/27/2015: , employed part-time.     Patient Active Problem List   Diagnosis     Tinea Pedis     Patellar Chondromalacia     A Fall Due To Slipping, Tripping, Or Stumbling     Dysplastic Nevus     Dyspepsia     Upper Back Pain (Between Shoulder Blades)     Diverticulosis     Cellulitis     Benign Adenomatous Polyp Of The Large Intestine     Chronic Obstructive Pulmonary Disease     Acute Colitis     Esophageal Reflux     Dermatitis     Joint Pain, Localized In The Shoulder     Tibia/fibula fracture     Fracture of tibia and fibula     Acute bronchitis with bronchospasm     Smoking addiction                                              SOCIAL: She  reports that she has been smoking cigarettes.  She has been smoking about 1.00 pack per day. She has never used smokeless tobacco. She reports that she drinks alcohol. She reports that she does not use drugs.    REVIEW OF SYSTEMS:   Family history not pertinent to chief complaint or presenting problem    Review of Systems:      Nervous System:  No headache, paresthesia or dizziness or fainting                                  Ears: No hearing loss or ringing in the ears    Eyes: No blurring of vision, Double Vision            No redness, itching or dryness.    Nose: No nosebleed or loss of smell    Mouth: No mouth sores or  coated tongue    Throat: No hoarseness or difficulty swallowing    Neck: No enlarged thyroid or lymph nodes.    Heart: No chest pain, palpitation or irregular heartbeat.  Does have claudication changes with left leg                 Lungs: No shortness of breath, wheezing or hemoptysis.    Gastrointestinal: No nausea or vomiting, melena or blood in  "stools.    Kidney/Bladdr: No polyuria, polydipsia, or hematuria.                             Genital/Sexual: No Sex function Changes                                Skin: Rash on soles and palms rash    Muscles/Joints/Bones: No Muscle morning stiffness, No Effusion of a Joint     Review of systems otherwise negative as requested from patient, except   Those positive ROS outlined and discussed in [place].    OBJECTIVE:  /70 (Patient Site: Right Arm, Patient Position: Sitting, Cuff Size: Adult Regular)   Pulse 80   Ht 5' 4\" (1.626 m)   Wt 151 lb (68.5 kg)   SpO2 96%   BMI 25.92 kg/m      GENERAL:     No acute distress.   Alert and oriented X 3         Physical:        Physical:  General Appearance: Healthy-appearingy.   Head:  No deformity  Eyes: Sclerae white, pupils equal and reactive, extra ocular movements intact no cataracts  Ears: Well-positioned, well-formed pinnae; TM pearly white, translucent, no bulging   Nose: Clear, normal mucosa no drainage or crusting  Throat: Lips, tongue, and mucosa are moist, pink and intact; tongue no thrush oral pharynx no injection or lesions upper and lower dentures  Neck: Supple, symmetric ROM no nodes   No carotid Buits  Chest: Lungs clear to auscultation, no retractions  Heart: Regular rate & rhythm, S1 S2, no murmur  Abdomen: Soft, non-tender, no masses; umbilical area normal   Pulses: Equal femoral pulses  : No hernia palpable   Extremities: Well-perfused, warm and dry, No Edema  Palpable Pulses Bilateral femoral however asymmetric absent left dorsalis pedis pulse neuro: Easily aroused good tone NO tremor   Skin pustular rash palms soles as well as scaly silver scale rash on elbows      Recent Results (from the past 240 hour(s))   LipoFit by NMR   Result Value Ref Range    Total Cholesterol 242 (H) <=199 mg/dL    Triglycerides 192 (H) 30 - 149 mg/dL    HDL Cholesterol 44 40 - 59 mg/dL    LDL Cholesterol, Calc 160 (H) <=129 mg/dL    HDL Particle Size, NMR 8.3 (L) " >=8.9 nm    VLDL Particle Size, NMR 52.7 (H) <=46.7 nm    LDL Particle Size, NMR 20.8 >=20.7 nm    Large HDL Particle Number, NMR <2.8 (L) >=4.2 umol/L    HDL Particle Number, NMR 37.6 >=33.0 umol/L    Large VLDL Particle Number, NMR 11.2 (H) <=2.7 nmol/L    Small LDL Particle Number,  (H) <=634 nmol/L    LDL Particle Number, NMR [id number] (H) <=1135 nmol/L    EER LipoFit by NMR See Note    Comprehensive Metabolic Panel   Result Value Ref Range    Sodium 140 136 - 145 mmol/L    Potassium 4.3 3.5 - 5.0 mmol/L    Chloride 105 98 - 107 mmol/L    CO2 24 22 - 31 mmol/L    Anion Gap, Calculation 11 5 - 18 mmol/L    Glucose 102 70 - 125 mg/dL    BUN 13 8 - 22 mg/dL    Creatinine 0.81 0.60 - 1.10 mg/dL    GFR MDRD Af Amer >60 >60 mL/min/1.73m2    GFR MDRD Non Af Amer >60 >60 mL/min/1.73m2    Bilirubin, Total 0.5 0.0 - 1.0 mg/dL    Calcium 10.4 8.5 - 10.5 mg/dL    Protein, Total 7.7 6.0 - 8.0 g/dL    Albumin 4.4 3.5 - 5.0 g/dL    Alkaline Phosphatase 86 45 - 120 U/L    AST 42 (H) 0 - 40 U/L    ALT 48 (H) 0 - 45 U/L   C -Reactive Protein, High Sensitivity   Result Value Ref Range    CRP, High Sensitivity 3.8 (H) 0.0 - 3.0 mg/L   Vitamin D, Total (25-Hydroxy)   Result Value Ref Range    Vitamin D, Total (25-Hydroxy) 24.7 (L) 30.0 - 80.0 ng/mL   Urinalysis-UC if Indicated   Result Value Ref Range    Color, UA Yellow Colorless, Yellow, Straw, Light Yellow    Clarity, UA Clear Clear    Glucose, UA Negative Negative    Bilirubin, UA Negative Negative    Ketones, UA Negative Negative    Specific Gravity, UA 1.025 1.005 - 1.030    Blood, UA Moderate (!) Negative    pH, UA 5.0 5.0 - 8.0    Protein, UA Negative Negative mg/dL    Urobilinogen, UA 0.2 E.U./dL 0.2 E.U./dL, 1.0 E.U./dL    Nitrite, UA Negative Negative    Leukocytes, UA Negative Negative    Bacteria, UA Few (!) None Seen hpf    RBC, UA None Seen None Seen, 0-2 hpf    WBC, UA 0-5 None Seen, 0-5 hpf    Squam Epithel, UA 0-5 None Seen, 0-5 lpf    Mucus, UA Few (!) None Seen  lpf   Thyroid Cascade   Result Value Ref Range    TSH 1.73 0.30 - 5.00 uIU/mL   Lipoprotein a (LPPA)   Result Value Ref Range    Lipoprotein (a) <2 0 - 30 mg/dL   Homocysteine   Result Value Ref Range    Homocysteine 10 0 - 13 umol/L    Patient Fasting > 8hrs? Yes    HM1 (CBC with Diff)   Result Value Ref Range    WBC 10.1 4.0 - 11.0 thou/uL    RBC 5.15 3.80 - 5.40 mill/uL    Hemoglobin 15.6 12.0 - 16.0 g/dL    Hematocrit 48.4 (H) 35.0 - 47.0 %    MCV 94 80 - 100 fL    MCH 30.3 27.0 - 34.0 pg    MCHC 32.2 32.0 - 36.0 g/dL    RDW 12.7 11.0 - 14.5 %    Platelets 296 140 - 440 thou/uL    MPV 10.5 8.5 - 12.5 fL    Neutrophils % 67 50 - 70 %    Lymphocytes % 23 20 - 40 %    Monocytes % 6 2 - 10 %    Eosinophils % 3 0 - 6 %    Basophils % 1 0 - 2 %    Neutrophils Absolute 6.7 2.0 - 7.7 thou/uL    Lymphocytes Absolute 2.3 0.8 - 4.4 thou/uL    Monocytes Absolute 0.6 0.0 - 0.9 thou/uL    Eosinophils Absolute 0.3 0.0 - 0.4 thou/uL    Basophils Absolute 0.1 0.0 - 0.2 thou/uL         ASSESSMENT & PLAN      Amirah was seen today for annual exam.    Diagnoses and all orders for this visit:    Visit for screening mammogram  -     Mammo Screening Bilateral; Future    Screen for colon cancer  -     Ambulatory referral for Colonoscopy    Claudication (H)  -     US Ankle Brachial Indices; Future    Tobacco use  -     AMB LUNG CANCER SCREENING REFERRAL    Encounter for screening for lung cancer  -     AMB LUNG CANCER SCREENING REFERRAL    Pustulosis palmaris et plantaris  -     Ambulatory referral to Dermatology  -     triamcinolone (KENALOG) 0.5 % ointment; Apply twice daily to the affected area palms and soles and elbows onto resolved and use sparingly    Well adult exam  -     LipoFit by NMR  -     Comprehensive Metabolic Panel  -     HM1(CBC and Differential)  -     C -Reactive Protein, High Sensitivity  -     Vitamin D, Total (25-Hydroxy)  -     Urinalysis-UC if Indicated  -     Thyroid Cascade  -     Lipoprotein a (LPPA)  -      Homocysteine  -     HM1 (CBC with Diff)    Family history of ischemic heart disease  -     LipoFit by NMR  -     Lipoprotein a (LPPA)  -     Homocysteine        Return in about 3 months (around 11/19/2019).       Anticipatory Guidance and Symptomatic Cares Discussed   Advised to call back directly if there are further questions, or if these symptoms fail to improve as anticipated or worsen.  Return to clinic if patient has a clinical concern that warrants an exam.         I spent 30 minutes with this patient face to face, of which 50% or greater was spent in counseling and coordination of care with regards to Amirah was seen today for annual exam.    Diagnoses and all orders for this visit:    Visit for screening mammogram  -     Mammo Screening Bilateral; Future    Screen for colon cancer  -     Ambulatory referral for Colonoscopy    Claudication (H)  -     US Ankle Brachial Indices; Future    Tobacco use  -     AMB LUNG CANCER SCREENING REFERRAL    Encounter for screening for lung cancer  -     AMB LUNG CANCER SCREENING REFERRAL    Pustulosis palmaris et plantaris  -     Ambulatory referral to Dermatology  -     triamcinolone (KENALOG) 0.5 % ointment; Apply twice daily to the affected area palms and soles and elbows onto resolved and use sparingly    Well adult exam  -     LipoFit by NMR  -     Comprehensive Metabolic Panel  -     HM1(CBC and Differential)  -     C -Reactive Protein, High Sensitivity  -     Vitamin D, Total (25-Hydroxy)  -     Urinalysis-UC if Indicated  -     Thyroid Cascade  -     Lipoprotein a (LPPA)  -     Homocysteine  -     HM1 (CBC with Diff)    Family history of ischemic heart disease  -     LipoFit by NMR  -     Lipoprotein a (LPPA)  -     Homocysteine        Hector Luu MD  Family Medicine   [facility] [phone]

## 2021-05-31 NOTE — PATIENT INSTRUCTIONS - HE
Accomplish your mammogram    Accomplish your colonoscopy    We can follow-up on your circulation please do the ankle-brachial indices first if this is normal then I will have you see orthopedics otherwise we will work-up vascular disease    See the dermatologist I think likely have psoriasis  Use the triamcinolone for palms and soles as well as elbows again get dermatology's input    Eliminate sugar  Increase walking  For the time being take 81 mg baby aspirin daily

## 2021-06-01 NOTE — PROGRESS NOTES
Patient seen in conjunction with my fellow Dr. Parker.  Please see her note for additional details but after the initial visit we sent her for a CTA and vein mapping.  The CT demonstrated left mid SFA short segment occlusion with poor profunda collaterals strongly suggestive of an endovascularly treatable lesion.  Patient does have adequate vein for bypass should this be necessary.    Overall we are dealing with a 63-year-old woman with rest pain in her left leg requiring her to hang her leg over the edge of the bed at night.  In addition the pain is associated with very short distance claudication affecting her ability to work.  Quit smoking 1 week ago appears to be successful so far.  Just started on statin.      Our plan is for her to start on daily aspirin therapy.  We will have her undergo angiointervention SFA disease 17th at [facility].  I will have her start Plavix 3 days prior to load her for this.  Risks of the procedure including risk of embolization and need for emergency surgery as well as risk of bleeding and contrast nephropathy discussed at length.  At some time discussing patency of the different interventions and how we would deal with things if there was reocclusion.  I discussed plan for simple angioplasty unless the technical outcome was not adequate in which case we would plan on placing a stent.

## 2021-06-01 NOTE — OP NOTE
[facility] Interventional Radiology vascular surgery procedure    Date: 09/17/19    Procedures Performed:  Ir Extremity Angiogram Left from the aorta level via right groin approach    Selective left leg Angiogram with distalmost catheter position and greater than third order popliteal artery    Angioplasty left superficial femoral artery    Ultrasound-guided vascular access    Provider:  Yuki Ponce MD     Assistant:  Linda Parker MD.  Vascular surgery fellow    Indication:  This is a 63-year-old smoker who is now quit for 2 weeks who has had very short distance claudication in addition to some foot pain consistent with development of rest pain: Requiring her to hang her foot down from the bed to resolve the symptoms at night..  Has failed conservative management for her peripheral vascular disease symptoms and her claudication is severely affecting her ability to work.  She has been preloaded on Plavix for the last 3 days the plan of angiointervention based on CTA demonstrating focal SFA lesion.    Sedation:  Moderate Sedation: The procedure was performed with administration of intravenous conscious sedation with appropriate preoperative, intraoperative, and postoperative evaluation.  75 minutes of supervised face to face intraservice time was provided by a radiology nurse under my direct supervision.  Versed 2 mg and fentanyl 150 mcg given.    Antibiotics:  none    Fluoroscopic Time:  17.7 Minutes    Radiation Dose:  371 mGy    Contrast:  50 cc Visipaque    Procedure:    Ultrasound was used to evaluate the right groin.  The selected vessel was the right common femoral artery which was clearly patent on ultrasound imaging and appeared to bifurcate fairly early at the distal edge of the femoral head.  It was devoid of significant anterior wall plaque, but was approximately 3-1/2 cm deep. Ultrasound was then used for real-time ultrasound guided needle entry of the common femoral artery. Permanent images were  recorded and saved to the patient's medical record.    Micropuncture technique was used to exchange and a 4 Estonian sheath.  An Omni Flush catheter was advanced to the lower abdominal aorta level and an aortogram was performed    Findings: Patent distal abdominal aorta, common iliac arteries bilaterally, internal and external iliac arteries bilaterally.  Patent common femoral arteries bilaterally.    Wire and catheter were advanced over the aortic bifurcation down to the left common femoral level and a left leg Janice Jacobo was performed    Findings: Patent profunda femoris artery and superficial femoral artery origins.  Superficial femoral artery has a focal occlusion of a little over 3 cm at the level of Deepak's canal which reconstitutes immediately below.  This is a flush occlusion with a large collateral coming off it side making crossing potentially more challenging.  Beyond this there is three-vessel tibial runoff without disease.  This constitutes a TASC A lesion of the SFA.    Over stiff angle Glidewire a 4 Estonian up and over sheath was introduced to the mid SFA level.  The patient was fully heparinized to an ACT of 280.    An angled CXI catheter and stiff straight Glidewire were used to traverse the focal SFA occlusion.  The catheter was advanced to the above-knee popliteal level and angiogram was performed    Findings: Patent above-knee popliteal artery confirming intraluminal location of her catheter.    An 018 wire was then exchanged in and over the 018 wire a 4 mm x 4 cm angioplasty balloon was introduced.  2 inflations were performed, each for 2 minutes, each to 8 elyse of pressure to treat the entire segment of disease.  A 30 inflation was then performed after angiography identified residual disease and irregularity at the proximal cap of the occlusion.  An angiogram was performed from the sheath level after this.    Findings: Recanalized SFA occlusion with no residual stenosis and only mild  irregularity.  No evidence of dissection.  No evidence of distal embolization with widely patent tibial outflow.    I felt that this was a good size match the native artery and given how small the vessels were stenting this would be suboptimal for long-term durability.    Sheath was exchanged for short 4 Pashto sheath and the patient was given 10 units of protamine for an ACT of 250.  Time to remove the sheath and an hour in recovery room.        Impression:  Successful focal SFA occlusion recanalization with simple balloon angioplasty.      Yuki Ponce  Vascular Surgery

## 2021-06-01 NOTE — PROGRESS NOTES
Patient seen in PACU.     ACT rechecked - 138 at 1110.   Sheath pulled from right groin and pressure applied for 10 minutes.   No notable hematoma or complications s/p sheath removal.     Discharge instructions reviewed with patient and spouse at the bedside.   Questions answered.   Already has US and follow up visit scheduled with Dr. Ponce.     Marco Dominguez CNP    [phone] [facility]  Vascular Surgery

## 2021-06-01 NOTE — PROGRESS NOTES
Called to assess patient in [facility] c/o of lower back pain while lying flat. No flank ecchymosis notes. Abdomen nontender. R groin without significant fullness or hematoma. Non-con CT A/P obtained due to persistent pain which was negative for retroperitoneal or R groin hematoma. Symptoms significantly improved with patient in sitting and standing positions. Ambulated to restroom without issue. Will discharge home with routine follow up.    Linda Parker MD  Vascular Surgery Fellow

## 2021-06-01 NOTE — PROGRESS NOTES
VASCULAR SURGERY OUTPATIENT CONSULT OR VISIT   VASCULAR SURGEON: Yuki Ponce MD    LOCATION:  [facility]    Amirah Shea   Medical Record #:  [id number]  YOB: 1955  Age:  63 y.o.     Date of Service: 10/2/2019    PRIMARY CARE PROVIDER: Hector Luu MD      Reason for consultation: Follow-up after left leg angiointervention    IMPRESSION: Patient with some degree of rest pain and severe claudication in the left leg who underwent left SFA angiointervention by me on September 17.  Dramatic improvement with resolution of claudication symptoms completely according to her.  Normal ankle-brachial indices.  Very limited Alcaraz Fong test yesterday but she says this was because she was scared to walk until she saw her cardiologist regarding her known calcifications on her calcium score test.  She very strongly feels she could have walked much further.  Yesterday she walked for 23 minutes without stopping outside.  Has been successful so far with smoking cessation although she is still quite anxious according to her.    RECOMMENDATION: Excellent early outcome post angiointervention.  So far successful smoking cessation as well.  Very strongly supported her continued smoking cessation.  Continue Plavix for now.  I will see her back in 3 months time with repeat Alcaraz Fong testing and left leg arterial duplex.  Bruises very easily on the Plavix so at her next visit we might look at sending her for a Plavix resistance or sensitivity assay if we decide to continue her on it.    HPI:  Amirah Shea is a 63 y.o. female who was seen today in follow-up for her peripheral vascular disease.  She came to me with extremely short distance claudication and rest pain where she had to dangle her leg down at night.  Successful intervention and after that she has not had any of the symptoms.  Is quite proud that she was able to walk for 23 minutes without stopping yesterday  evening with her .  She is also appropriately proud that she has managed to stay away from cigarettes now for a full month.  She has been compliant with her Plavix and aspirin therapy. She does note that she bruises extremely easily.    PHH:  No past medical history on file.     Past Surgical History:   Procedure Laterality Date     HYSTERECTOMY       IR EXTREMITY ANGIOGRAM LEFT  9/17/2019     OOPHORECTOMY       IL APPENDECTOMY      Description: Appendectomy;  Recorded: 01/08/2010;     IL OPEN TREATMENT FRACTURE DISTAL TIBIA ONLY Left 11/27/2015    Procedure: OPEN REDUCTION INTERNAL FIXATION LEFT TIBIAL FIBULAR FRACTURE;  Surgeon: Roberto Zepeda MD;  Location: [facility];  Service: Orthopedics     IL PART REMOVAL COLON W ANASTOMOSIS      Description: Partial Colectomy;  Recorded: 03/11/2014;     IL REMOVAL GALLBLADDER      Description: Cholecystectomy;  Recorded: 01/08/2010;     IL TOTAL ABDOM HYSTERECTOMY      Description: Total Abdominal Hysterectomy;  Recorded: 01/08/2010;  Comments: with BSO       ALLERGIES:  Patient has no known allergies.    MEDS:    Current Outpatient Medications:      aspirin 81 MG EC tablet, Take 81 mg by mouth daily., Disp: , Rfl:      cholecalciferol, vitamin D3, (D3-50 CHOLECALCIFEROL) 50,000 unit capsule, Take 2,000 Units by mouth daily.    , Disp: , Rfl:      clobetasol (TEMOVATE) 0.05 % ointment, MONIQUE AA ON HANDS AND FEET BID FOR 1 MONTH. DO NOT APPLY TO FACE OR SKIN FOLDS., Disp: , Rfl: 0     clopidogrel (PLAVIX) 75 mg tablet, Take 1 tablet (75 mg total) by mouth daily., Disp: 90 tablet, Rfl: 0     co-enzyme Q-10 30 mg capsule, Take 200 mg by mouth daily.    , Disp: , Rfl:      ibuprofen (ADVIL,MOTRIN) 200 MG tablet, Take 200 mg by mouth every 6 (six) hours as needed for pain., Disp: , Rfl:      rosuvastatin (CRESTOR) 10 MG tablet, Take 1 tablet (10 mg total) by mouth at bedtime., Disp: 30 tablet, Rfl: 11    SOCIAL HABITS:    Social History     Tobacco Use   Smoking  "Status Former Smoker     Years: 50.00     Types: Cigarettes     Last attempt to quit: 2019     Years since quittin.0   Smokeless Tobacco Never Used       Social History     Substance and Sexual Activity   Alcohol Use Yes     Alcohol/week: 2.0 standard drinks     Types: 2 Shots of liquor per week     Frequency: 2-3 times a week       Social History     Substance and Sexual Activity   Drug Use No       FAMILY HISTORY:    Family History   Problem Relation Age of Onset     Cancer Father      Heart disease Sister      Ovarian cancer Maternal Aunt      Cancer Brother      Cancer Maternal Aunt         lung     Cancer Maternal Aunt         lung     Heart disease Sister      Cancer Sister         Rectal 63      Clotting disorder Sister      Heart disease Brother      BRCA 1/2 Neg Hx      Breast cancer Neg Hx      Endometrial cancer Neg Hx        REVIEW OF SYSTEMS:    A 12 point ROS was reviewed and except for what is listed in the HPI above, all others are negative    PE:  /82 (Patient Site: Right Arm, Patient Position: Sitting, Cuff Size: Adult Regular)   Pulse 84   Temp 98  F (36.7  C) (Oral)   Resp 16   Ht 5' 4\" (1.626 m)   Wt 149 lb (67.6 kg)   BMI 25.58 kg/m    Wt Readings from Last 1 Encounters:   10/02/19 149 lb (67.6 kg)     Body mass index is 25.58 kg/m .    EXAM:  GENERAL: This is a well-developed 63 y.o. female who appears her stated age  EYES: Grossly normal.  MOUTH: Buccal mucosa normal   MUSCULOSKELETAL: Grossly normal and both lower extremities are intact.  HEME/LYMPH: No lymphedema  NEUROLOGIC: Focally intact, Alert and oriented x 3.   PSYCH: appropriate affect  INTEGUMENT: No open lesions or ulcers      DIAGNOSTIC STUDIES:     Images:  Ct Abdomen Pelvis Without Oral Without Iv Contrast    Result Date: 2019  EXAM: CT ABDOMEN PELVIS WO ORAL WO IV CONTRAST LOCATION: [facility] DATE/TIME: 2019 3:50 PM INDICATION: Abdominal pain, acute, nonlocalized back pain. COMPARISON: " 1/16/2014. TECHNIQUE: Helical thin-section CT scan of the abdomen and pelvis was performed without oral or IV contrast. Multiplanar reformats were obtained. Dose reduction techniques were used. CONTRAST: None. FINDINGS: LUNG BASES: Negative. ABDOMEN: Postcholecystectomy. Noncontrast liver, bile ducts, spleen, pancreas, and adrenal glands are negative. Excreted contrast in the bilateral renal collecting systems and urinary bladder from the angiogram earlier today. PELVIS: Sigmoid anastomosis. No dilatation or inflammation of large or small bowel. Appendix not separately visualized. MUSCULOSKELETAL: Mild inflammation of the right groin related to the recent angiogram study technique.     CONCLUSION: No acute process detected in the abdomen or pelvis on this noncontrast study.    Ir Extremity Angiogram Left    Result Date: 9/17/2019  [facility] Interventional Radiology vascular surgery procedure Date: 09/17/19 Procedures Performed: Ir Extremity Angiogram Left from the aorta level via right groin approach   Selective left leg Angiogram with distalmost catheter position and greater than third order popliteal artery   Angioplasty left superficial femoral artery   Ultrasound-guided vascular access Provider: Yuki Ponce MD Assistant: Linda Parker MD.  Vascular surgery fellow Indication: This is a 63-year-old smoker who is now quit for 2 weeks who has had very short distance claudication in addition to some foot pain consistent with development of rest pain: Requiring her to hang her foot down from the bed to resolve the symptoms at night..  Has failed conservative management for her peripheral vascular disease symptoms and her claudication is severely affecting her ability to work.  She has been preloaded on Plavix for the last 3 days the plan of angiointervention based on CTA demonstrating focal SFA lesion. Sedation: Moderate Sedation: The procedure was performed with administration of intravenous conscious sedation  with appropriate preoperative, intraoperative, and postoperative evaluation.  75 minutes of supervised face to face intraservice time was provided by a radiology nurse under my direct supervision.  Versed 2 mg and fentanyl 150 mcg given. Antibiotics: none Fluoroscopic Time: 17.7 Minutes Radiation Dose: 371 mGy Contrast: 50 cc Visipaque Procedure:   Ultrasound was used to evaluate the right groin.  The selected vessel was the right common femoral artery which was clearly patent on ultrasound imaging and appeared to bifurcate fairly early at the distal edge of the femoral head.  It was devoid of significant anterior wall plaque, but was approximately 3-1/2 cm deep. Ultrasound was then used for real-time ultrasound guided needle entry of the common femoral artery. Permanent images were recorded and saved to the patient's medical record.   Micropuncture technique was used to exchange and a 4 Korean sheath.  An Omni Flush catheter was advanced to the lower abdominal aorta level and an aortogram was performed   Findings: Patent distal abdominal aorta, common iliac arteries bilaterally, internal and external iliac arteries bilaterally.  Patent common femoral arteries bilaterally.   Wire and catheter were advanced over the aortic bifurcation down to the left common femoral level and a left leg Janice Jacobo was performed   Findings: Patent profunda femoris artery and superficial femoral artery origins.  Superficial femoral artery has a focal occlusion of a little over 3 cm at the level of Deepak's canal which reconstitutes immediately below.  This is a flush occlusion with a large collateral coming off it side making crossing potentially more challenging.  Beyond this there is three-vessel tibial runoff without disease.  This constitutes a TASC A lesion of the SFA.   Over stiff angle Glidewire a 4 Korean up and over sheath was introduced to the mid SFA level.  The patient was fully heparinized to an ACT of 280.   An angled  CXI catheter and stiff straight Glidewire were used to traverse the focal SFA occlusion.  The catheter was advanced to the above-knee popliteal level and angiogram was performed   Findings: Patent above-knee popliteal artery confirming intraluminal location of her catheter.   An 018 wire was then exchanged in and over the 018 wire a 4 mm x 4 cm angioplasty balloon was introduced.  2 inflations were performed, each for 2 minutes, each to 8 elyse of pressure to treat the entire segment of disease.  A 30 inflation was then performed after angiography identified residual disease and irregularity at the proximal cap of the occlusion.  An angiogram was performed from the sheath level after this.   Findings: Recanalized SFA occlusion with no residual stenosis and only mild irregularity.  No evidence of dissection.  No evidence of distal embolization with widely patent tibial outflow.   I felt that this was a good size match the native artery and given how small the vessels were stenting this would be suboptimal for long-term durability.   Sheath was exchanged for short 4 Mohawk sheath and the patient was given 10 units of protamine for an ACT of 250.  Time to remove the sheath and an hour in recovery room. Impression: Successful focal SFA occlusion recanalization with simple balloon angioplasty. Yuki Ponce Vascular Surgery     Cta Abdominal Aorta Iliofemoral Runoff    Result Date: 9/4/2019  EXAM DATE:         09/04/2019 [place] [facility] LOCATION: [facility] DATE: 9/4/2019 CT ANGIO AORTA/ARTERIES INDICATION: Left leg pain. TECHNIQUE: Helical acquisition through the abdomen, pelvis, and bilateral lower extremities was performed during the arterial phase of contrast enhancement using IV Contrast. 2D and 3D reconstructions were performed by the CT technologist. Dose reduction techniques were used. IV CONTRAST: 100ml IV Isovue 370 administered. SPR I-Stat Cr=0.8mg/dl, eGFR=73. COMPARISON: None. FINDINGS:  AORTA: The lower thoracic and upper abdominal aorta is normal in caliber with mild atherosclerotic calcified plaque. The infrarenal aorta is normal in caliber with moderate amount of calcified and noncalcified plaque without significant stenosis. There is greater than 60% stenosis of the celiac artery proximal segment with minimal poststenotic dilatation and somewhat hooklike appearance on sagittal imaging. The SMA takeoff is at least 50% narrowed secondary to atherosclerotic calcified plaque. Robust filling of the distal arterial tree in these territories without visceral aneurysm or occlusion/high-grade stenosis. Classic hepatic artery anatomy. The inferior mesenteric artery is patent. There is moderate calcified and noncalcified plaque of the common iliac arteries bilaterally without significant stenosis. Minimal atherosclerotic disease of the external iliac and common femoral arteries bilaterally without significant stenosis. Moderate atheromatous disease of the internal iliac arteries which remain patent. RIGHT LEG: The right common femoral artery and profunda femoral artery are widely patent. Superficial femoral artery is widely patent to level of the mid thigh then demonstrates focal mild stenosis. Distal SFA, popliteal, and three-vessel runoff to the right foot are widely patent. LEFT LEG: The left common femoral artery demonstrates mild atheromatous plaque. The profunda femoral artery is widely patent. The left superficial femoral artery demonstrates mild to moderate diffuse disease to level of the mid thigh. It occludes for a segment of approximately 2 cm near the adductor hiatus. There is a large collateral vessel at this area which contributes to distal reconstitution. The popliteal and three-vessel runoff to the left foot is widely patent. LUNG BASES: Atelectasis and mosaic attenuation within the lung bases likely on the basis of expiratory phase imaging. No pleural effusion or pneumothorax. ABDOMEN:  Exam is not tailored for evaluation of the solid organs. The liver is normal in configuration. Gallbladder surgically absent. Common bile duct is mildly prominent as can be seen in post cholecystectomy patients. The pancreas, spleen, and adrenal glands are normal in appearance. There is no hydronephrosis or nephrolithiasis. The bowel is nonobstructed. PELVIS: Urinary bladder is normal in appearance. Pelvic phleboliths are noted. Uterus is absent. There is no ascites or pneumoperitoneum. No retroperitoneal or extraperitoneal pelvic lymphadenopathy by size criteria. MUSCULOSKELETAL: Benign-appearing lesions within the distal fibula most likely representing chondromas. No suspicious or destructive osseous lesions. CONCLUSION: 1.  Short segment occlusion of the left superficial femoral artery at the adductor canal. There is a prominent collateral vessels suggesting this may represent a chronic occlusion. Embolic etiology is also possible. 2.  Mild to moderate atherosclerotic disease of the proximal and mid superficial femoral artery. Only mild disease throughout remainder of the lower extremities. 3.  Moderate stenosis with hooklike configuration at the celiac trunk. Moderate stenosis of the superior mesenteric artery. The inferior mesenteric artery is widely patent.     Mammo Screening Bilateral    Result Date: 9/16/2019  BILATERAL FULL FIELD DIGITAL SCREENING MAMMOGRAM Performed on: 9/16/19. Compared to: 08/21/2014 Mammo Screening Bilateral and 04/15/2010 Mammo Screening Bilateral Findings: The breasts have scattered areas of fibroglandular densities. There is no radiographic evidence of malignancy. This study was evaluated with the assistance of Computer-Aided Detection. Continue routine screening mammogram as recommended. ACR BI-RADS Category 1: Negative    Us Vein Mapping For Pre Bypass Graft Bilateral    Result Date: 9/4/2019  Vein Mapping Ultrasound Lower Extremity Saphenous Veins Indication: Bilateral Mapping of  lower extremity veins Technique: Ultrasound of the Superficial Veins with Compression Maneuvers. Duplex Imaging of CFV is performed utilizing gray-scale, Two-dimensional images, color-flow imaging, Doppler waveform analysis, and Spectral doppler imaging done with provacative maneuvers. Location  Prox Thigh (mm) Mid Thigh (mm) Distal Thigh (mm) Knee (mm)  Prox Calf (mm) Mid Calf (mm) Distal Calf (mm) Ankle (mm) Right GSV 3.97 2.31 2.49 3.14 1.71 2.02 2.26 2.32 Right SSV    2.84 2.33 2.23 2.13 - Left GSV 5.03 4.03 3.52 3.65 3.00 2.33 1.31 - Left SSV    1.97 2.10 1.67 1.66 0.84 Comments:  Impression: Left leg greater saphenous vein appears to be adequate caliber for bypass conduit from groin to proximal calf level.  Right leg greater saphenous vein appears of inadequate caliber in the mid and distal thigh as well as in the mid and distal calf. Lesser saphenous veins appear inadequate in caliber for bypass conduit     Ct Abdomen Pelvis Without Oral With Without Iv Contrast    Result Date: 9/23/2019  EXAM: CT ABDOMEN PELVIS WO ORAL W WO IV CONTRAST LOCATION: [facility] DATE/TIME: 9/23/2019 2:15 PM INDICATION: Microscopic hematuria COMPARISON: Noncontrast CT 09/17/2019 and CT abdomen pelvis 01/16/2014 TECHNIQUE: Helical thin-section CT scan of the abdomen and pelvis was performed without contrast. Images were then obtained during and after injection of IV contrast, with delayed images through the renal collecting systems. Multiplanar reformats were obtained. Dose reduction techniques were used.? CONTRAST: Iohexol (Omni) 100 mL FINDINGS: LUNG BASES: Negative. ABDOMEN: No renal stones and no hydronephrosis. Mild vascular calcifications. Few tiny benign cysts in both kidneys. No filling defects or strictures in the renal collecting systems. Normal bladder. No significant findings in the liver, spleen, pancreas, or adrenal glands. No lymphadenopathy. PELVIS: Postop change in the pelvis including postop change sigmoid  colon. Pelvis otherwise negative. MUSCULOSKELETAL: Negative.     CONCLUSION: 1.  No significant findings in the kidneys or renal collecting systems on either side. 2.  No significant incidental finding.    Us Ankle Brachial Indices Pre And Post Exercise    Result Date: 10/1/2019  Ultrasound Ankle Brachial Index with Exercise Indication: SURVEILLANCE POST LEFT LEG SFA DISTAL BALLOON ANGIOPLASTY 9/17/19 Previous: 8/21/2019 NANCY'S History: Smoke, Hyperlipidemia, PAD, Angioplasty and Claudication Resting NANCY's          Right: mmHg Index     Brachial: 181  Ankle-(PT): 177 0.98 Ankle-(DP): 190 1.05           Digit: 141 0.78               Left: mmHg Index     Brachial: 177  Ankle-(PT): 187 1.03 Ankle-(DP): 1763 0.96           Digit: 115 0.64 Resting ankle-brachial index of 1.05 on the right. Toe Pressures of 141 mmHg and TBI of 0.78.  Resting ankle-brachial index of 1.03 on the left. Toe Pressures of 115 mmHg and TBI of 0.64. WAVEFORMS: The right dorsalis pedis and posterior tibial arteries show normal waveforms. The left dorsalis pedis and posterior tibial arteries show normal waveforms. Exercise Testing: GARNDER-KIM PROTOCOL Time (min) Grade Level % Rate MPH Level of Pain (1-10) Area of Pain 1 0 2.0  0    2 0 2.0 0  RT CALF TIGHTNESS 3 2 2.0  1  BILAT CALF TIGHTNESS 4 2 2.0  1  STOPPED, shortness of breath  Post Exercise Pressures: Location: Rest 1 minute 3 minute 5 minute 7 minute 10 minute Right Ankle  104 127 139 143 141 Left Ankle  109 122 131 135 139 Right Brachial 181 198 185 176 175 179 Right NANCY: 1.05 0.53 0.69 0.79 0.82 0.79 Left NANCY: 1.03 0.55 0.66 0.74 0.77 0.78 Comments:  Impression: 1. RIGHT LOWER EXTREMITY: Normal ankle-brachial index of 1.05 with normal toe pressures of 141 mmHg 2. LEFT LOWER EXTREMITY: Normal ankle-brachial index of 1.03 with normal toe pressures of 150 mmHg.  Note that this is a significant change from her pre-angiogram ABIs and toe pressures. 3. MOBLEY KIM TESTING:  Patient only able to walk for 4 minutes with Alcaraz Fong testing due to shortness of breath.  Had bilateral calf tightness at only 3 minutes.  Significant drop in ABIs post testing support the possibility that this continue to be vascular in etiology.     Us Arterial Leg Left    Result Date: 10/1/2019  Arterial Duplex Ultrasound Lower Extremity Artery Evaluation Indication: SURVEILLANCE POST LEFT LEG SFA DISTAL BALLOON ANGIOPLASTY 9/17/19  Previous: 8/21/2019 NANCY'S History: Smoke, Hyperlipidemia, PAD, Angioplasty and Claudication Technique: Duplex imaging is performed utilizing gray-scale, two-dimensional images, and color-flow imaging. Doppler waveform analysis and spectral Doppler imaging is also performed. LOWER EXTREMITY ARTERIAL DUPLEX EXAM WITH WAVEFORMS Right Leg:(cm/s) Location: Velocities Waveforms PTA:   70   B MAGALI:   71  T DPA:   112  B Waveforms: T=Triphasic, M=Monophasic, B=Biphasic Left Leg:(cm/s) Location: Velocities Waveforms EIA:   182  T CFA:   160  T PFA:   152  B SFA Proximal:   136  T SFA Mid:   176  T SFA Distal:   186  T Popliteal Artery:   85/94/102  T PTA:   124   M MAGALI:   107  M DPA:   98  M Waveforms: T=Triphasic, M=Monophasic, B=Biphasic Comments:   Impression: Right Lower Extremity: Not evaluated Left Lower Extremity: Widely patent SFA now status post angioplasty.  Pedal Duplex shows monophasic waveforms suggesting tibial vessel disease. Reference: Category Normal 1-19% 20-49% 50-99% Occluded PSV <160 cm/sec without spectral broadening <160 cm/sec with spectral broadening Increased Increased Absent Flow Ratio N/A N/A < 2.0 >2.0 N/A Post-Stenotic Turbulence No No No Yes N/A       I personally reviewed the images and my interpretation is that her SFA occlusion is now resolved on duplex imaging and her ABIs are normal.  Still very poor walking distance on Alcaraz Fong testing but she says that that was due to being anxious and not wanting to push things..    LABS:      Sodium   Date  Value Ref Range Status   08/19/2019 140 136 - 145 mmol/L Final   03/10/2017 138 136 - 145 mmol/L Final   11/28/2015 138 136 - 145 mmol/L Final     Potassium   Date Value Ref Range Status   09/17/2019 4.4 3.5 - 5.0 mmol/L Final   08/19/2019 4.3 3.5 - 5.0 mmol/L Final   03/10/2017 4.5 3.5 - 5.0 mmol/L Final     Chloride   Date Value Ref Range Status   08/19/2019 105 98 - 107 mmol/L Final   03/10/2017 104 98 - 107 mmol/L Final   11/28/2015 105 98 - 107 mmol/L Final     BUN   Date Value Ref Range Status   08/19/2019 13 8 - 22 mg/dL Final   03/10/2017 17 8 - 22 mg/dL Final   11/28/2015 12 8 - 22 mg/dL Final     Creatinine   Date Value Ref Range Status   09/17/2019 0.82 0.60 - 1.10 mg/dL Final   08/19/2019 0.81 0.60 - 1.10 mg/dL Final   03/10/2017 0.75 0.60 - 1.10 mg/dL Final     Hemoglobin   Date Value Ref Range Status   09/17/2019 14.3 12.0 - 16.0 g/dL Final   08/19/2019 15.6 12.0 - 16.0 g/dL Final   03/10/2017 14.2 12.0 - 16.0 g/dL Final     Platelets   Date Value Ref Range Status   09/17/2019 280 140 - 440 thou/uL Final   08/19/2019 296 140 - 440 thou/uL Final   03/10/2017 283 140 - 440 thou/uL Final     INR   Date Value Ref Range Status   09/17/2019 0.84 (L) 0.90 - 1.10 Final   11/27/2015 0.89 (L) 0.90 - 1.10 Final           Yuki Ponce MD  VASCULAR SURGERY

## 2021-06-01 NOTE — TELEPHONE ENCOUNTER
Writer spoke with Geena in IR and confirmed L leg angiogram for 9/17/19 at 8am at [facility] with Dr. Ponce.

## 2021-06-01 NOTE — PROGRESS NOTES
US 10/1. L leg angiogram 9/17/19 for rest pain. Very short distance claudication. Able to walk over a mile with no pain or complications.

## 2021-06-01 NOTE — PROGRESS NOTES
CONSULT. PVD. Dr. Luu referring. Describes pain left leg with walking gets better when she stops about 2-3 blocks. Been going on for over a year.  L leg fracture, hardware was put in and removed in 2015 saw Dr. Zepeda [facility].  US comp 8/21, LEFT LOWER EXTREMITY: Moderate resting ischemia. Segmental limb pressures suggest the presence of obstructive disease within the inflow and femoropopliteal segments.

## 2021-06-01 NOTE — PRE-PROCEDURE
Procedure Name: left leg angiogram, possible intervention.  Monitoring and sedation for procedure  Date/Time: 9/17/2019 7:59 AM    Verbal consent obtained?: Yes  Written consent obtained?: Yes  Risks and benefits: Risks, benefits and alternatives were discussed  Discharge plan: Appropriate discharge home plan in place for patients who are going home after procedure   Consent given by: patient  Expected level of sedation: moderate  ASA Class: Class 1- healthy patient  Mallampati: Grade 1- soft palate, uvula, tonsillar pillars, and posterior pharyngeal wall visible  Patient states understanding of procedure being performed: Yes  Patient's understanding of procedure matches consent: Yes  Procedure consent matches procedure scheduled: Yes  Appropriately NPO: yes  Lungs: lungs clear with good breath sounds bilaterally  Heart: normal heart sounds and rate  History & Physical reviewed: History and physical reviewed and no updates needed  Statement of review: I have reviewed the lab findings, diagnostic data, medications, and the plan for sedation

## 2021-06-03 VITALS — HEIGHT: 64 IN | WEIGHT: 151 LBS | BODY MASS INDEX: 25.78 KG/M2

## 2021-06-03 VITALS
HEART RATE: 84 BPM | RESPIRATION RATE: 16 BRPM | WEIGHT: 149 LBS | TEMPERATURE: 98 F | BODY MASS INDEX: 25.44 KG/M2 | HEIGHT: 64 IN | SYSTOLIC BLOOD PRESSURE: 152 MMHG | DIASTOLIC BLOOD PRESSURE: 82 MMHG

## 2021-06-03 VITALS — HEIGHT: 64 IN | BODY MASS INDEX: 25.48 KG/M2 | WEIGHT: 149.25 LBS

## 2021-06-03 VITALS
RESPIRATION RATE: 16 BRPM | WEIGHT: 152.4 LBS | HEART RATE: 64 BPM | SYSTOLIC BLOOD PRESSURE: 134 MMHG | DIASTOLIC BLOOD PRESSURE: 70 MMHG | BODY MASS INDEX: 26.16 KG/M2

## 2021-06-03 NOTE — PATIENT INSTRUCTIONS - HE
Ultrasound your carotid arteries to exclude any significant stenosis in your big arteries of the neck    I will order a CT angiogram given your coronary calcifications  Treatment of coronary artery disease is multi faceted  Excellent nutrition high nutrient foods eliminate sugars  Regular physical activity which helps improve your circulation blood flow this will also help your peripheral vascular disease  An aspirin a day  A statin which you are on  Support the statin with co-Q10 200 mg daily  We may add a medication such as a beta-blocker  We may add a medication such as an ACE inhibitor  Restorative sleep  And smoking cessation and I congratulate you on your efforts as well as your progress toward discontinuing altogether    Through with Dr. Barbosa's opinion about stopping Plavix      Sure you are taking an adequate multi B vitamin  Add turmeric 1000 mg daily to your supplement regimen  Eat 1 cruciferous vegetable daily and to eat 5-10 servings of vegetables daily      Come in for a fasting lipid profile in the next 4 weeks

## 2021-06-03 NOTE — PROGRESS NOTES
ASSESSMENT & PLAN    No problem-specific Assessment & Plan notes found for this encounter.      Amirah was seen today for follow-up.    Diagnoses and all orders for this visit:    Atherosclerosis of native artery of left lower extremity with rest pain (H)  Left Angioplasty SFA 9/17/2019        There are no Patient Instructions on file for this visit.    No follow-ups on file.            CHIEF COMPLAINT: Amirah Shea had concerns including Follow-up (vasc surg, derm, colonoscopy, mammo).    [place]: 1.............. had concerns including Follow-up (vasc surg, derm, colonoscopy, mammo).    1. Atherosclerosis of native artery of left lower extremity with rest pain (H)  Left Angioplasty SFA 9/17/2019          CC:             Why are you here today?                          Claims here to follow-up  Doing great with smoking cessation she went from a pack and half a day down to less than a pack a week  She is been more active and exercising can walk 30 minutes  She is trying to eat better focusing her nutrition  Statin is going well without muscle aches  She is on Plavix and aspirin  Known history of coronary artery calcifications  Denies any active chest pain  Sister with a history of stroke            SUBJECTIVE:  Amirah Shea is a 63 y.o. female                                SOCIAL: She  reports that she quit smoking about 2 months ago. Her smoking use included cigarettes. She quit after 50.00 years of use. She has never used smokeless tobacco. She reports current alcohol use of about 2.0 standard drinks of alcohol per week. She reports that she does not use drugs.    REVIEW OF SYSTEMS:   Family history not pertinent to chief complaint or presenting problem    Review of Systems:      Nervous System:  No new or change in headache, paresthesia or tremor                                  Ears: No new hearing loss or ringing in the ears    Eyes: No new blurring of vision, Double Vision             "    Nose: No new nosebleed or loss of smell    Mouth: No new mouth sores or  coated tongue    Throat: No new hoarseness or difficulty swallowing    Neck: No new neck pain or mass    Heart: No new chest pain, palpitation or irregular heartbeat.                  Lungs: No new shortness of breath, wheezing or hemoptysis.    Gastrointestinal: No new nausea or vomiting, melena or blood in stools.    Kidney/Bladder: No new polyuria, polydipsia, or hematuria.                             Genital/Sexual: No new Sex function Changes                                Skin: No new rash    Muscles/Joints/Bones: No changes in muscles / joint swelling     Review of systems otherwise negative as requested from patient, except   Those positive ROS outlined and discussed in [place].      VITALS:      Physical Exam:  Woman she is in no distress  No arcus senilis  Upper lower dentures  TMs are clear  Mucosa is congested  Soft carotid bruit on the right  Lungs are clear  Cardiac S1-S2 regular sinus appreciable murmur gallop  Posterior tibial pulse palpable on the right I cannot appreciate this on the left  She has excellent capillary refill  Well-healed scar anterior shin  Calves are supple        Vitals:    19 0919   BP: 152/78   Pulse: 70   SpO2: 98%   Weight: 149 lb 12 oz (67.9 kg)   Height: 5' 4\" (1.626 m)     Wt Readings from Last 3 Encounters:   19 149 lb 12 oz (67.9 kg)   10/02/19 149 lb (67.6 kg)   19 149 lb 4 oz (67.7 kg)     Body mass index is 25.7 kg/m .    PFSH:    Social History     Tobacco Use   Smoking Status Former Smoker     Years: 50.00     Types: Cigarettes     Last attempt to quit: 2019     Years since quittin.2   Smokeless Tobacco Never Used       Family History   Problem Relation Age of Onset     Cancer Father      Heart disease Sister      Ovarian cancer Maternal Aunt      Cancer Brother      Cancer Maternal Aunt         lung     Cancer Maternal Aunt         lung     Heart disease Sister      " Cancer Sister         Rectal 63      Clotting disorder Sister      Heart disease Brother      BRCA 1/2 Neg Hx      Breast cancer Neg Hx      Endometrial cancer Neg Hx        Social History     Socioeconomic History     Marital status:      Spouse name: Not on file     Number of children: Not on file     Years of education: Not on file     Highest education level: Not on file   Occupational History     Not on file   Social Needs     Financial resource strain: Not on file     Food insecurity:     Worry: Not on file     Inability: Not on file     Transportation needs:     Medical: Not on file     Non-medical: Not on file   Tobacco Use     Smoking status: Former Smoker     Years: 50.00     Types: Cigarettes     Last attempt to quit: 2019     Years since quittin.2     Smokeless tobacco: Never Used   Substance and Sexual Activity     Alcohol use: Yes     Alcohol/week: 2.0 standard drinks     Types: 2 Shots of liquor per week     Frequency: 2-3 times a week     Drug use: No     Sexual activity: Not on file   Lifestyle     Physical activity:     Days per week: Not on file     Minutes per session: Not on file     Stress: Not on file   Relationships     Social connections:     Talks on phone: Not on file     Gets together: Not on file     Attends Synagogue service: Not on file     Active member of club or organization: Not on file     Attends meetings of clubs or organizations: Not on file     Relationship status: Not on file     Intimate partner violence:     Fear of current or ex partner: Not on file     Emotionally abused: Not on file     Physically abused: Not on file     Forced sexual activity: Not on file   Other Topics Concern     Not on file   Social History Narrative    2015: , employed part-time.       Past Surgical History:   Procedure Laterality Date     HYSTERECTOMY       IR EXTREMITY ANGIOGRAM LEFT  2019     OOPHORECTOMY       AK APPENDECTOMY      Description: Appendectomy;   Recorded: 01/08/2010;     IN OPEN TREATMENT FRACTURE DISTAL TIBIA ONLY Left 11/27/2015    Procedure: OPEN REDUCTION INTERNAL FIXATION LEFT TIBIAL FIBULAR FRACTURE;  Surgeon: Roberto Zepeda MD;  Location: [facility] [place];  Service: Orthopedics     IN PART REMOVAL COLON W ANASTOMOSIS      Description: Partial Colectomy;  Recorded: 03/11/2014;     IN REMOVAL GALLBLADDER      Description: Cholecystectomy;  Recorded: 01/08/2010;     IN TOTAL ABDOM HYSTERECTOMY      Description: Total Abdominal Hysterectomy;  Recorded: 01/08/2010;  Comments: with BSO       No Known Allergies    Active Ambulatory Problems     Diagnosis Date Noted     Tinea Pedis      Patellar Chondromalacia      A Fall Due To Slipping, Tripping, Or Stumbling      Dysplastic Nevus      Dyspepsia      Upper Back Pain (Between Shoulder Blades)      Diverticulosis      Cellulitis      Benign Adenomatous Polyp Of The Large Intestine      Chronic Obstructive Pulmonary Disease      Acute Colitis      Esophageal Reflux      Dermatitis      Joint Pain, Localized In The Shoulder      Tibia/fibula fracture 11/27/2015     Fracture of tibia and fibula 11/27/2015     Acute bronchitis with bronchospasm 11/28/2015     Smoking addiction 11/28/2015     Atherosclerosis of native artery of left lower extremity with rest pain (H)  Left Angioplasty SFA 9/17/2019      Microscopic hematuria 11/09/2019     Resolved Ambulatory Problems     Diagnosis Date Noted     No Resolved Ambulatory Problems     No Additional Past Medical History         MEDICATIONS:  Current Outpatient Medications   Medication Sig Dispense Refill     aspirin 81 MG EC tablet Take 81 mg by mouth daily.       cholecalciferol, vitamin D3, (D3-50 CHOLECALCIFEROL) 50,000 unit capsule Take 2,000 Units by mouth daily.              clobetasol (TEMOVATE) 0.05 % ointment MONIQUE AA ON HANDS AND FEET BID FOR 1 MONTH. DO NOT APPLY TO FACE OR SKIN FOLDS.  0     clopidogrel (PLAVIX) 75 mg tablet Take 1 tablet (75 mg total)  by mouth daily. 90 tablet 0     co-enzyme Q-10 30 mg capsule Take 200 mg by mouth daily.              ibuprofen (ADVIL,MOTRIN) 200 MG tablet Take 200 mg by mouth every 6 (six) hours as needed for pain.       rosuvastatin (CRESTOR) 10 MG tablet Take 1 tablet (10 mg total) by mouth at bedtime. 30 tablet 11     No current facility-administered medications for this visit.               I spent 25  minutes with this patient face to face, of which 50% or greater was spent in counseling and coordination of care with regards to Amirah was seen today for follow-up.    Diagnoses and all orders for this visit:    Atherosclerosis of native artery of left lower extremity with rest pain (H)  Left Angioplasty SFA 9/17/2019        Hector Luu MD  Family [facility] [address] [phone]

## 2021-06-04 VITALS
SYSTOLIC BLOOD PRESSURE: 152 MMHG | WEIGHT: 149.75 LBS | BODY MASS INDEX: 25.57 KG/M2 | OXYGEN SATURATION: 98 % | DIASTOLIC BLOOD PRESSURE: 78 MMHG | HEART RATE: 70 BPM | HEIGHT: 64 IN

## 2021-06-04 NOTE — TELEPHONE ENCOUNTER
Patient Returning Call  Reason for call:  Patient called back.  Information relayed to patient:  n/a  Patient has additional questions:  Yes  If YES, what are your questions/concerns:  States insurance will not pay for the CT Angiogram Coronary test.  Okay to leave a detailed message?: Yes, if needed.

## 2021-06-04 NOTE — TELEPHONE ENCOUNTER
FYI - Status Update  Who is Calling: Patient  Update: The patient had cancelled the CT scheduled for 12/19/19, as last night she received a letter from her insurance carrier stating that they after reviewing Dr Luu's information, feel that this CT is not necessary.  The patient is wondering what to do now, patient is understandably frustrated with the insurance company, but doesn't want to pay out of pocket either.  Okay to leave a detailed message?:  Yes

## 2021-06-04 NOTE — TELEPHONE ENCOUNTER
Left message for patient to please call back so we can explain a little more of what Dr. Luu is advising. Please relay message to patient when she calls back and see how she would like to proceed.     AMANDAP, ABDOULAYE

## 2021-06-04 NOTE — TELEPHONE ENCOUNTER
Patient Returning Call  Reason for call:  Returning clinic call.  Information relayed to patient:  Read to the patient the note by Dr Luu.  The patient would like to proceed with the nuclear med stress test please.  Please place order in chart and referral if needed.  The patient is hoping to get this done before the end of the year if at all possible.  Patient has additional questions:  No  If YES, what are your questions/concerns:  NA  Okay to leave a detailed message?: No call back needed

## 2021-06-04 NOTE — TELEPHONE ENCOUNTER
12-27-19  Daljit tompkins connecting back, patient is schedule for Nuclear Stress Test on 12-30-19 @ [facility]

## 2021-06-04 NOTE — TELEPHONE ENCOUNTER
These call clean back    We can order a nuclear medicine stress test to assess whether she has angina or lack of blood flow to the heart muscle    We could also do a CT calcium score which is a screening test cash pay this test is $250 typically not covered by insurance    Ask clean how she wishes to proceed    Hector Luu

## 2021-06-09 NOTE — PROGRESS NOTES
PREOPERATIVE HISTORY AND PHYSICAL EXAM     SITUATIONAL DATA ELEMENTS:  N/A    SUBJCTIVE  Patient is being seen today for Preoperative Consultation at the request of Dr. Zepeda for the underlying condition of left leg    Exam Date: 03/10/17  Examiner: Dr.Daniel Jus MD ([facility])  Primary Provider: Hector Luu MD  Surgery Date:  3/15/17  Scheduled Surgery: Left leg surgery, removing hardware in leg       Mild swelling end of day  Sensitive  Does hurt      HISTORY OF PRESENT ILLNESS:  Amirah Shea is a 61 y.o. female year old who last saw me 3/8/2017.    Past/Current Medical Problems  Patient Active Problem List   Diagnosis     Tinea Pedis     Patellar Chondromalacia     A Fall Due To Slipping, Tripping, Or Stumbling     Dysplastic Nevus     Dyspepsia     Upper Back Pain (Between Shoulder Blades)     Diverticulosis     Cellulitis     Benign Adenomatous Polyp Of The Large Intestine     Chronic Obstructive Pulmonary Disease     Acute Colitis     Esophageal Reflux     Dermatitis     Joint Pain, Localized In The Shoulder     Tibia/fibula fracture     Fracture of tibia and fibula     Acute bronchitis with bronchospasm     Smoking addiction       Hospitalizations:  None    Surgeries:    Past Surgical History:   Procedure Laterality Date     HYSTERECTOMY       OOPHORECTOMY       AK APPENDECTOMY      Description: Appendectomy;  Recorded: 01/08/2010;     AK OPEN TREATMENT FRACTURE DISTAL TIBIA ONLY Left 11/27/2015    Procedure: OPEN REDUCTION INTERNAL FIXATION LEFT TIBIAL FIBULAR FRACTURE;  Surgeon: Roberto Zepeda MD;  Location: [facility] [place];  Service: Orthopedics     AK PART REMOVAL COLON W ANASTOMOSIS      Description: Partial Colectomy;  Recorded: 03/11/2014;     AK REMOVAL GALLBLADDER      Description: Cholecystectomy;  Recorded: 01/08/2010;     AK TOTAL ABDOM HYSTERECTOMY      Description: Total Abdominal Hysterectomy;  Recorded: 01/08/2010;  Comments: with BSO       Family  History:  Amirah Shea's family history includes Cancer in her father, maternal aunt, and maternal aunt; Colon cancer in her brother; No Medical Problems in her daughter, maternal grandfather, maternal grandmother, mother, paternal aunt, paternal grandfather, paternal grandmother, and sister; Ovarian cancer in her maternal aunt. There is no history of BRCA 1/2, Breast cancer, or Endometrial cancer.  Anesthesia Reactions: None  Bleeding Disorders: None    Social History:  she  reports that she has been smoking Cigarettes.  She has been smoking about 1.00 pack per day. She has never used smokeless tobacco. She reports that she drinks alcohol. She reports that she does not use illicit drugs.    Medications:    Current Outpatient Prescriptions:      ibuprofen (ADVIL,MOTRIN) 200 MG tablet, Take 200 mg by mouth every 6 (six) hours as needed for pain., Disp: , Rfl:      clotrimazole-betamethasone (LOTRISONE) cream, Apply to affected area 2 times daily, Disp: 45 g, Rfl: 1     triamcinolone (KENALOG) 0.1 % paste, Apply 3 times daily to mouth sore, Disp: 5 g, Rfl: 12  HELD MEDICATIONS: None.    Allergies:  No latex allergies.  No Known Allergies    REVIEW OF SYSTEMS:  Remainder of complete head to toe negative.   CARDIOVASCULAR: Negative for CAD, CHF, Valvular Disease, HTN, Atrial Fibrillation, Other Arrhythmia, ICD/Pacer, Peripheral Vascular Disease.  PULMONARY: Positive  for++  COPD, Asthma, Apnea.  RENAL: Negative for Chronic diuretic use, Renal Failure, Dialysis.  ENDOCRINE: Negative for Diabetes, Chronic steroid use, Thyroid Disease.  GI: Negative for GERD, Hiatal Hernia, Hepatic Disease.  NEURO: Negative for CVA, TIA, Seizures, Neuropathy.  HEMATOLOGIC DISEASE: Negative for Clotting Disorder, Bleeding Disorder, Anemia.  MUSCULOSKELETAL: Negative for Arthritis, Muscular Dystrophy.  MENTAL HEALTH: Negative for Anxiety, Depression, Dementia, ETOH/Drug History.  OTHER: Negative for increased risk for excessive  blood loss, potential for refusing blood products, MRSA, VRE, HIV, AIDS, TB or pregnancy in women.    PHYSICAL EXAMINATION  CONSTITUTIONAL - In general, no acute distress.  Vitals:    03/10/17 1102   BP: 138/72   Pulse: 73   Resp: 16   Temp: 97.7  F (36.5  C)   SpO2: 98%       SKIN     No rash, lesions or ulcers.    HEENT:  Normal conjunctiva and lids.  Extraoccular movements intact.  Pupils equal, reactive to light and accomodation..  Clear tympanic membranes  Non-inflamed Nasal Mucosa.  Clear external auditory canals.  Normal Hearing grosslyl.    Normal gums/teet   Left facial swelling 3-4 days Cager sore left lower consistent with swelling  oropharynx.  No thyromegaly, tenderness or mass.  Normal carotid artery without bruits    RESPIRATORY:    Normal respiratory effort.    Clear to auscultation bilaterally.    CARDIAC:    Regular rate and rhythm, normal S1 S2.  No murmurs rubs or gallops.  Normal and symmetric pedal pulses.    No edema or varicosities.    GI/ABDOMEN  Soft  Non-tender  Non-distended  Present Bowel sounds.    No Hepatosplenomegaly.  No Hernia.    MUSCULOSKELETAL -     Normal gait and station.    Normal digits and nails.  Left leg lower mild chronic edema trace  bones, joints and muscles including head and neck, spine and pelvis, and extremities.  Bilateral rash feet pustualr + scale  NEUROLOGIC      Intact Cranial Nerves II-XII .     PSYCHIATRIC      Mood and affect congruent.    Good Insight and Judgement.  Oriented to time, place and person.    Normal Eye contact.    LYMPH/HEME    No Bruising    DATA:  LABS:   Physical on 03/10/2017   Component Date Value Ref Range Status     VENTRICULAR RATE 03/10/2017 72  BPM Incomplete     ATRIAL RATE 03/10/2017 72  BPM Incomplete     P-R INTERVAL 03/10/2017 152  ms Incomplete     QRS DURATION 03/10/2017 82  ms Incomplete     Q-T INTERVAL 03/10/2017 402  ms Incomplete     QTC CALCULATION (BEZET) 03/10/2017 440  ms Incomplete     P Axis 03/10/2017 49  degrees  Incomplete     R AXIS 03/10/2017 54  degrees Incomplete     T AXIS 03/10/2017 63  degrees Incomplete     MUSE DIAGNOSIS 03/10/2017    Incomplete                    Value:Normal sinus rhythm  Normal ECG  When compared with ECG of 10-MAR-2017 11:14,  No significant change was found     ]  EKG:     NSR   Normal ST/[place]  Not appreciated BBB    CHEST XRAY:     Not/Indicated    PREGNANCY TEST: N/A    ASSESSMENT & PLAN  Amirah was seen today for pre-op exam.    Diagnoses and all orders for this visit:    Pre-op examination  -     Electrocardiogram Perform and Read    Screen for colon cancer  -     Ambulatory referral for Colonoscopy    Visit for screening mammogram  -     Mammo Screening Bilateral; Future    Fracture of tibia and fibula    Fixation hardware in leg    Aphthous ulcer    Dermatitis  -     KOH Prep    Other orders  -     clotrimazole-betamethasone (LOTRISONE) cream; Apply to affected area 2 times daily  -     triamcinolone (KENALOG) 0.1 % paste; Apply 3 times daily to mouth sore        PLAN:  Patient approved for surgery with general or local anesthesia.  Post-operative pain to be managed by Surgeon during post-operative timeframe, as defined by [facility]  Postoperative Care will be managed by Hospital Service, if admitted.  Stop Aspirin, NSAID's (Ibuprofen, Aleve) and other Medications as relayed to Patient  Follow Recommendations for Preoperative Medications from Surgery Team  Above recommendations were reviewed with patient.  May need as needed albuterol for bronchospasm no problems with surgeries in the past  Foot rash consistent with psoriasis versus tinea pedis use Lotrisone  Left lower facial swelling consistent with her canker sore is triamcinolone paste    INFORM OUR OFFICE OR SURGEON  IF ANY NEW ILLNESS OR CLINICAL CONCERN FELT PERTINENT TO SAFELY PROCEEDING WITH SURGERY     Preoperative Cardiac Risk Stratificaiton and Management    Acceptable Risk Cardiac risk  assessment  Beta-blocker protocol   Not indicated    NO active Cardiac Conditions including     NO Unstable/Severe Angina,   NO Recent Myocardial Infarction (<3 mo),   NO New, worsening or decompensated heart failure,   NO Significant Arrhythmias,   NO Severe Valvular Disease]    Low Risk Surgery.  Functional Capacity > 4 METS. Assessed such as: (Walking 4 MPH, Shoveling Snow, Mowing Lawn, Heavy Cleaning (Washing Windows, Vacuuming or Mopping)  No Beta Blockage/cardiac evaluation:  No Ischemic Heart Disease; Compensated or prior heart failure; Diabetes mellitus;  Chronic kidney disease; Cerebrovascular disease.     Final Disposition  Proceed with proposed surgery without additional clinical clarifications  No Cardiology consultation or non-invasive testing.

## 2021-06-11 NOTE — TELEPHONE ENCOUNTER
Refill Approved    Rx renewed per Medication Renewal Policy. Medication was last renewed on 08/23/2019.  Last office visit was 11/22/2019 with PCP.    Kassandra Freed, [facility] Triage/Med Refill 9/7/2020     Requested Prescriptions   Pending Prescriptions Disp Refills     rosuvastatin (CRESTOR) 10 MG tablet [Pharmacy Med Name: ROSUVASTATIN 10MG TABLETS] 30 tablet 11     Sig: TAKE 1 TABLET(10 MG) BY MOUTH AT BEDTIME       Statins Refill Protocol (Hmg CoA Reductase Inhibitors) Passed - 9/6/2020  3:33 AM        Passed - PCP or prescribing provider visit in past 12 months      Last office visit with prescriber/PCP: 11/22/2019 Hector Luu MD OR same dept: 11/22/2019 Hector Luu MD OR same specialty: 11/22/2019 Hector Luu MD  Last physical: 8/19/2019 Last MTM visit: Visit date not found   Next visit within 3 mo: Visit date not found  Next physical within 3 mo: Visit date not found  Prescriber OR PCP: Hector Luu MD  Last diagnosis associated with med order: 1. Claudication (H)  - rosuvastatin (CRESTOR) 10 MG tablet [Pharmacy Med Name: ROSUVASTATIN 10MG TABLETS]; TAKE 1 TABLET(10 MG) BY MOUTH AT BEDTIME  Dispense: 30 tablet; Refill: 11    2. Tobacco use  - rosuvastatin (CRESTOR) 10 MG tablet [Pharmacy Med Name: ROSUVASTATIN 10MG TABLETS]; TAKE 1 TABLET(10 MG) BY MOUTH AT BEDTIME  Dispense: 30 tablet; Refill: 11    3. Dyslipidemia  - rosuvastatin (CRESTOR) 10 MG tablet [Pharmacy Med Name: ROSUVASTATIN 10MG TABLETS]; TAKE 1 TABLET(10 MG) BY MOUTH AT BEDTIME  Dispense: 30 tablet; Refill: 11    If protocol passes may refill for 12 months if within 3 months of last provider visit (or a total of 15 months).

## 2021-06-14 NOTE — TELEPHONE ENCOUNTER
RN cannot approve Refill Request    RN can NOT refill this medication PCP messaged that patient is overdue for Office Visit. Last office visit: 11/22/2019 Hector Luu MD Last Physical: 8/19/2019 Last MTM visit: Visit date not found Last visit same specialty: 11/22/2019 Hector Luu MD.  Next visit within 3 mo: Visit date not found  Next physical within 3 mo: Visit date not found      Gin Mike, [facility] Connection Triage/Med Refill 1/17/2021    Requested Prescriptions   Pending Prescriptions Disp Refills     rosuvastatin (CRESTOR) 10 MG tablet [Pharmacy Med Name: ROSUVASTATIN 10MG TABLETS] 30 tablet 3     Sig: TAKE 1 TABLET(10 MG) BY MOUTH AT BEDTIME       Statins Refill Protocol (Hmg CoA Reductase Inhibitors) Failed - 1/17/2021  3:35 AM        Failed - PCP or prescribing provider visit in past 12 months      Last office visit with prescriber/PCP: 11/22/2019 Hector Luu MD OR same dept: Visit date not found OR same specialty: 11/22/2019 Hector Luu MD  Last physical: 8/19/2019 Last MTM visit: Visit date not found   Next visit within 3 mo: Visit date not found  Next physical within 3 mo: Visit date not found  Prescriber OR PCP: Hector Luu MD  Last diagnosis associated with med order: 1. Claudication (H)  - rosuvastatin (CRESTOR) 10 MG tablet [Pharmacy Med Name: ROSUVASTATIN 10MG TABLETS]; TAKE 1 TABLET(10 MG) BY MOUTH AT BEDTIME  Dispense: 30 tablet; Refill: 3    2. Tobacco use  - rosuvastatin (CRESTOR) 10 MG tablet [Pharmacy Med Name: ROSUVASTATIN 10MG TABLETS]; TAKE 1 TABLET(10 MG) BY MOUTH AT BEDTIME  Dispense: 30 tablet; Refill: 3    3. Dyslipidemia  - rosuvastatin (CRESTOR) 10 MG tablet [Pharmacy Med Name: ROSUVASTATIN 10MG TABLETS]; TAKE 1 TABLET(10 MG) BY MOUTH AT BEDTIME  Dispense: 30 tablet; Refill: 3    If protocol passes may refill for 12 months if within 3 months of last provider visit (or a total of 15 months).

## 2021-06-16 PROBLEM — R31.29 MICROSCOPIC HEMATURIA: Status: ACTIVE | Noted: 2019-11-09

## 2021-06-16 PROBLEM — L40.8: Status: ACTIVE | Noted: 2019-11-22

## 2021-06-17 NOTE — PATIENT INSTRUCTIONS - HE
Patient Instructions by Angel Howard RN at 9/4/2019  9:20 AM     Author: Angel Howard RN Service: -- Author Type: Registered Nurse    Filed: 9/4/2019 11:47 AM Encounter Date: 9/4/2019 Status: Signed    : Angel Howard RN (Registered Nurse)       You have been scheduled for the following procedure:    Procedure: LEFT leg Angiogram    Date: 9/17/19    Arrival Time: 6:30am    Procedure Time: 8am    Location: [facility]    Please report to the information desk located in the [place] entrance on [address] at [facility] or the Main Radiology desk on the ground level at [facility].  Tell them you are scheduled for an appointment in Interventional Radiology.  They will then direct you to the Surgical Admit Unit or the Main Radiology desk.    Please arrive at the hospital by 6:30 am on the day of your procedure.  (Note your scheduled arrival time will be earlier than your scheduled procedure start time to allow for your pre-procedure exam, lab work, preparation and consent.)    Other Instructions:    Do not eat or drink anything after midnight before your procedure.    You may take your normal medications on the morning of your procedure with a few sips of water (unless otherwise instructed)    Please inform us if you are taking insulin, Glucophage (Metformin), Coumadin, Arixtra, or Lovenox.    Please bring a current list of medications with    If you are having an angiogram:    You will need a responsible adult to stay with you at home your first night.    You will need a     You will need to hold your Coumadin 5 days before your angiogram and should consult with your primary regarding this.    Hold your Metformin the night before and morning of angiogram    Take only 1/2 of your bedtime insulin dose and hold your morning dose but bring it with you to the angiogram.    It is ok  to stay on your aspirin unless the Vascular surgery directs you differently.        If you have any questions regarding your  procedure, your instructions or should you need to reschedule or cancel your procedure, please contact Anay[facility] at the Health system Vascular [facility].       Peripheral Angiography  Peripheral angiography is an outpatient procedure that makes a map of the vessels (arteries) in your lower body, legs, and arms, using X-ray and dye.This map can show where blood flow may be blocked.  Talk with your healthcare provider about the risks and complications of angiography.   Before the procedure  Prepare for the peripheral angiography as follows:     Tell your healthcare provider about all medicines you take and any allergies you may have.    Follow any directions youre given for not eating or drinking before the procedure. If your provider says to take your normal medicines, swallow them with only small sips of water.    Arrange for a family member or friend to drive you home.  During the procedure  Here is what to expect:      You may get medicine through an IV (intravenous) line to relax you. Youre given an injection to numb the insertion site. Then, a tiny skin cut (incision) is made near an artery in your groin.    Your provider inserts a thin tube (catheter) through the incision. He or she then threads the catheter into an artery while looking at a video monitor.    Contrast dye is injected into the catheter to confirm position. You may feel warmth or pressure in your legs and back. You lie still as X-rays are taken. The catheter is then taken out.  After the procedure  Youll be taken to a recovery area. A healthcare provider will apply pressure to the site for about 10 minutes. Your healthcare provider will tell you how long to lie down and keep the insertion site still. Your healthcare provider will discuss the results with you soon after the procedure.  Back at home  On the day you get home, dont drive, dont exercise, avoid walking and taking stairs, and avoid bending and lifting. Your healthcare provider may give you other  care instructions.  Call your healthcare provider  Call your healthcare provider right away if:    You notice a lump or bleeding at the insertion site    You feel pain at the insertion site    You become lightheaded or dizzy    You have leg pain or numbness    You do not urinate in 8 hours    Date Last Reviewed: 5/1/2016 2000-2017 [employer]. [address] [place] [address]. All rights reserved. This information is not intended as a substitute for professional medical care. Always follow your healthcare professional's instructions.    Angiogram Procedure Discharge Instructions:     1. If you received sedation for your procedure: Do not drive or operate heavy machinery for the rest of the day.     2. Avoid strenuous activity for 72 hours (3 days):                        - Do not lift greater than 10 pounds.                        - Excessive exercise                        - Straining                        - Return to your normal activities as you tolerate after the 3 days restriction     3. Avoid tub baths, Jacuzzis, hot tubs and pools for 72 hours (3 days) or until puncture site is will healed.     4. You may shower beginning tomorrow. Do not scrub puncture site(s) until well healed, pat dry.     5. You can expect to return to work 1-2 days after your procedure - depending on the nature of your profession.     6. It is normal to have some tenderness and minimal swelling at puncture site. A small area of discoloration may be present. Tenderness typically subsides in 24-48 hours. A small knot may also be present at puncture site for 6-8 weeks, this can be a normal part of the healing process.     After the angiogram If you:      1. Experience any bleeding or active swelling from puncture site: Lie down, firmly apply pressure to puncture site and CALL 9-1-1     2. Fever greater than 101 degrees Fahrenheit.     3. Redness, swelling, warmth to touch, or purulent (yellow/green/foul smelling)  drainage from the puncture site.     4. Increasing pain, tenderness or swelling at puncture site OR of arm/leg near puncture site.     5. Feeling weak or faint.     6. Change in color, temperature, or sensation of arm/leg where puncture was made.     Call us with any other questions or concerns after your procedure: [phone]    You will need to have an ultrasound 2-3 weeks after your angiogram and should be scheduled at the time of your follow up appt.  Further follow up will be based on ultrasound results. Typical follow up is every 3 months for the first year, then every 6 months to one year thereafter.

## 2021-06-17 NOTE — TELEPHONE ENCOUNTER
RN cannot approve Refill Request    RN can NOT refill this medication PCP messaged that patient is overdue for Office Visit. Last office visit: 11/22/2019 Hector Luu MD Last Physical: 8/19/2019 Last MTM visit: Visit date not found Last visit same specialty: 11/22/2019 Hector Luu MD.  Next visit within 3 mo: Visit date not found  Next physical within 3 mo: Visit date not found      Jessie Kim, [facility] Connection Triage/Med Refill 5/16/2021    Requested Prescriptions   Pending Prescriptions Disp Refills     rosuvastatin (CRESTOR) 10 MG tablet [Pharmacy Med Name: ROSUVASTATIN 10MG TABLETS] 30 tablet 3     Sig: TAKE 1 TABLET(10 MG) BY MOUTH AT BEDTIME       Statins Refill Protocol (Hmg CoA Reductase Inhibitors) Failed - 5/16/2021 10:17 AM        Failed - PCP or prescribing provider visit in past 12 months      Last office visit with prescriber/PCP: 11/22/2019 Hector Luu MD OR same dept: Visit date not found OR same specialty: 11/22/2019 Hector Luu MD  Last physical: 8/19/2019 Last MTM visit: Visit date not found   Next visit within 3 mo: Visit date not found  Next physical within 3 mo: Visit date not found  Prescriber OR PCP: Hector Luu MD  Last diagnosis associated with med order: 1. Claudication (H)  - rosuvastatin (CRESTOR) 10 MG tablet [Pharmacy Med Name: ROSUVASTATIN 10MG TABLETS]; TAKE 1 TABLET(10 MG) BY MOUTH AT BEDTIME  Dispense: 30 tablet; Refill: 3    2. Tobacco use  - rosuvastatin (CRESTOR) 10 MG tablet [Pharmacy Med Name: ROSUVASTATIN 10MG TABLETS]; TAKE 1 TABLET(10 MG) BY MOUTH AT BEDTIME  Dispense: 30 tablet; Refill: 3    3. Dyslipidemia  - rosuvastatin (CRESTOR) 10 MG tablet [Pharmacy Med Name: ROSUVASTATIN 10MG TABLETS]; TAKE 1 TABLET(10 MG) BY MOUTH AT BEDTIME  Dispense: 30 tablet; Refill: 3    If protocol passes may refill for 12 months if within 3 months of last provider visit (or a total of 15 months).

## 2021-06-17 NOTE — PATIENT INSTRUCTIONS - HE
Patient Instructions by Cecy Borden RN at 10/2/2019  9:00 AM     Author: Cecy Borden RN Service: -- Author Type: Registered Nurse    Filed: 10/2/2019  9:34 AM Encounter Date: 10/2/2019 Status: Signed    : Cecy Borden RN (Registered Nurse)       Ankle-Brachial Index (NANCY) or Physiologic Test    Description  An ankle-brachial index test is relatively pain free. Blood pressure cuffs of various sizes are placed on your thigh, calf, foot and toes.  Similar to having your blood pressure checked with an arm cuff, as the technician inflates the cuffs, they progressively tighten and are then quickly released.  You may feel some discomfort, but generally for less than 60 seconds for each measurement. You will be asked to remove your socks and shoes and possibly your pants or shorts. Gowns will be provided. It usually takes about 30-60 minutes.   Depending on the initial readings and patient symptoms, you may be asked to perform a light walk on a treadmill.  The technician will apply ultrasound gel, usually warmed for your comfort, to your ankles and wrists. Through the gel, the technician will use a small hand-held device that emits sound waves.  Risks  There are typically no side effects or complications associated with a physiologic study.  How to Prepare  Eat and take medications as usual.  There is no preparation required for an ankle-brachial index (NANCY) or physiologic exam.  What Can I Expect After the Test?  The technician will send the ultrasound images to your vascular surgeon for evaluation. Typically, a report is available in 2-3 days. If anything critical is found, it is standard practice to notify the vascular surgeon immediately.  Reference: https://vascular.org/patient-resources/vascular-tests    Lower Extremity Arterial Ultrasound    Description  Ultrasound examinations are painless and easy for the patient. The vascular laboratory will contain a bed and just two or three pieces of  equipment. You will be asked to remove pants or shorts and gowns will be provided. It usually takes about 30 minutes.  The technician will tuck a towel under your underpants in the groin. The gel is water-soluble and will not stain your skin or clothes.  Ultrasound gel, usually warmed for your comfort, will be placed on the inner side of your legs.    Through the gel, the technician will apply to your legs a small hand-held device that emits sound waves.  When the test is completed, the technician will remove excess gel from your legs.    Risks  There are typically no side effects or complications associated with a lower extremity arterial duplex ultrasound.  How to Prepare  Eat and take medications as usual.  There is no preparation required for a lower extremity arterial duplex ultrasound.  What Can I Expect After the Test?  The technician will send the ultrasound images to your vascular surgeon for evaluation. Typically, a report is available in 2-3 days. If anything critical is found, it is standard practice to notify the vascular surgeon immediately.  Reference: https://vascular.org/patient-resources/vascular-tests

## 2021-06-20 NOTE — LETTER
Letter by Lucy Almonte RN at      Author: Lucy Almonte RN Service: -- Author Type: --    Filed:  Encounter Date: 9/10/2020 Status: (Other)         Amirah WhitmanOakley  [address] [facility] [address]      09/10/20      Dear Amirah,    Its time for your yearly exam to check for lung cancer.   Please call your primary care doctor to schedule a brief visit so your Low Dose CT scan can be ordered at the time of that appointment. Your scan needs to be done within 30 days of your office visit.      You should have a yearly exam if:  Youre age 55 to 80 (55 to 77, if youre on Medicare)  Youve smoked a pack a day for at least 30 years (or 2 packs a day for at least 15 years)  If you no longer smoke, its been less than 15 years since you quit     These exams work best when done every year. They are good at finding lung cancer early, but they cant find all lung cancers. If you develop any symptoms (shortness of breath, chest pain, coughing up blood), call your doctor right away.     If you would like help to quit smoking, please talk with your doctor during your next visit. Or, call QUIT[employer]AN at [phone].        Sincerely,  [facility] ([facility]) Lung Cancer Screening Program

## 2021-06-20 NOTE — LETTER
Letter by Yuki Ponce MD at      Author: Yuki Ponce MD Service: -- Author Type: --    Filed:  Encounter Date: 3/5/2020 Status: (Other)         03/05/20      Amirah Shea  [address] [facility] [facility] [address]    Dear Amirah,    As a valued [facility] patient, your healthcare needs are our priority. Our clinic records indicate we have attempted to contact you to reschedule a follow up appointment with Dr. Yuki Ponce as well as ultrasounds, but have not heard back from you. To prevent further delays in your care please contact our office to schedule your appointment as soon as possible.  We can be reached at: [phone]    Sincerely,    [facility]

## 2021-06-27 NOTE — PROGRESS NOTES
Progress Notes by Linda Parker MD at 9/4/2019  9:20 AM     Author: Linda Parker MD Service: -- Author Type: Resident    Filed: 9/4/2019 12:20 PM Encounter Date: 9/4/2019 Status: Attested    : Linda Parker MD (Resident) Cosigner: Yuki Ponce MD at 9/23/2019 11:40 AM    Attestation signed by Yuki Ponce MD at 9/23/2019 11:40 AM    I have seen and assessed this patient with Dr. Linda Parker MD, my vascular surgery fellow.  Agree with her attached note.                VASCULAR SURGERY CONSULTATION NOTE    HPI:  Amirah Shea is a 63 y.o. year old female who was referred by her PCP for claudication in her LLE. She had a fracture in her LLE several years ago she has some element of mild chronic pain there with chronic numbness in her L 4th and 5th toes. In the last 2 months she has been experiencing cramping in her L calf on ambulation which is new. The pain is relieved with rest. She also complains of pain in her foot that wakes her at night and is relieved by hanging her foot off the side of the bed. She has a longstanding history of smoking and has quit in the last week. She was found to have coronary artery disease incidentally found on a screening CT for lung cancer but denies angina but does not do cardiovascular exercise. She has a family history significant for MI, PAD s/p stents, and cerebral aneurysms in her sisters.     Review Of Systems:   General: Denies F/C  Respiratory: Denies SOB  Cardio: Denies CP  Gastrointestinal: Denies N/V  Genitourinary: Denies recent change in urination  Musculoskeletal: See HPI  Neurologic: Denies HA  Psychiatric: Denies confusion  Hematology/immunology: no unexpected bruising    PAST MEDICAL HISTORY:  No past medical history on file.    PAST SURGICAL HISTORY:  Past Surgical History:   Procedure Laterality Date   ? HYSTERECTOMY     ? OOPHORECTOMY     ? NV APPENDECTOMY      Description: Appendectomy;  Recorded: 01/08/2010;   ? NV OPEN TREATMENT FRACTURE DISTAL TIBIA ONLY  Left 2015    Procedure: OPEN REDUCTION INTERNAL FIXATION LEFT TIBIAL FIBULAR FRACTURE;  Surgeon: Roberto Zepeda MD;  Location: [facility] OR;  Service: Orthopedics   ? NC PART REMOVAL COLON W ANASTOMOSIS      Description: Partial Colectomy;  Recorded: 2014;   ? NC REMOVAL GALLBLADDER      Description: Cholecystectomy;  Recorded: 2010;   ? NC TOTAL ABDOM HYSTERECTOMY      Description: Total Abdominal Hysterectomy;  Recorded: 2010;  Comments: with BSO       FAMILY HISTORY:  Family History   Problem Relation Age of Onset   ? No Medical Problems Mother    ? Cancer Father    ? Heart disease Sister    ? No Medical Problems Daughter    ? No Medical Problems Maternal Grandmother    ? No Medical Problems Maternal Grandfather    ? No Medical Problems Paternal Grandmother    ? No Medical Problems Paternal Grandfather    ? Ovarian cancer Maternal Aunt    ? No Medical Problems Paternal Aunt    ? Cancer Brother    ? Cancer Maternal Aunt         lung   ? Cancer Maternal Aunt         lung   ? Heart disease Sister    ? Cancer Sister         Rectal 63    ? Clotting disorder Sister    ? Heart disease Brother    ? BRCA 1/2 Neg Hx    ? Breast cancer Neg Hx    ? Endometrial cancer Neg Hx        SOCIAL HISTORY:   Social History     Tobacco Use   ? Smoking status: Former Smoker     Years: 50.00     Types: Cigarettes     Last attempt to quit: 2019     Years since quittin.0   ? Smokeless tobacco: Never Used   Substance Use Topics   ? Alcohol use: Yes     Frequency: 2-3 times a week       HOME MEDICATIONS:  Prior to Admission medications    Medication Sig Start Date End Date Taking? Authorizing Provider   cholecalciferol, vitamin D3, (D3-50 CHOLECALCIFEROL) 50,000 unit capsule Take 50,000 Units by mouth daily.   Yes PROVIDER, HISTORICAL   co-enzyme Q-10 30 mg capsule Take 30 mg by mouth 3 (three) times a day.   Yes PROVIDER, HISTORICAL   ibuprofen (ADVIL,MOTRIN) 200 MG tablet Take 200 mg by mouth every 6  (six) hours as needed for pain.   Yes PROVIDER, HISTORICAL   rosuvastatin (CRESTOR) 10 MG tablet Take 1 tablet (10 mg total) by mouth at bedtime. 8/23/19  Yes Hector Luu MD   triamcinolone (KENALOG) 0.5 % ointment Apply twice daily to the affected area palms and soles and elbows onto resolved and use sparingly 8/19/19  Yes Hector Luu MD       VITAL SIGNS:  /70   Pulse 64   Resp 16   Wt 152 lb 6.4 oz (69.1 kg)   BMI 26.16 kg/m      PHYSICAL EXAM:  Constitutional: healthy, alert, no acute distress and cooperative   Cardiovascular: RRR  Respiratory: Breathing unlabored without secondary muscle use  Psychiatric: mentation appears normal and affect normal/bright  Neck: no asymmetry  GI/Abdomen: +BS, abdomen soft, non-tender. No palpable masses.  MSK: able to move all extremities without weakness or ataxia  Extremities: no open lesions, extremities warm, B/L femoral pulses palpable +2, R DP/PT palpable +2, L DP/PT nonpalpable  Hematology: no bruising on visible skin    IMAGING:  [facility]  EXAM:  SEGMENTAL ARTERIAL PRESSURES OF THE LOWER EXTREMITIES   8/21/2019 4:51 PM     INDICATION: Claudication.     COMPARISON: None.     LOWER EXTREMITY ARTERIAL DUPLEX EXAM WITH WAVEFORMS  RIGHT (cm/s)  PTA: 53.2 B  DPA: 75.7 B  (M=monophasic, B=biphasic, T=triphasic)     LEFT (cm/s)  PTA: 46.1 M  DPA: 17.1 M  (M=monophasic, B=biphasic, T=triphasic)     The resting ABIs are 0.95 on the right and 0.56 on the left.     WAVEFORMS: Blunted pulse volume recording waveforms within the left ankle. Monophasic duplex waveforms within the left posterior tibial and left dorsalis pedis arteries. Normal biphasic waveforms within the right posterior tibial and right dorsalis pedis   arteries. Post stenotic waveforms within the left great digit.     SEGMENTAL BP  RIGHT (mmHg)  Brachial: 150  High Thigh: 215; Index 1.34  Low Thigh: 186; Index 1.16  Calf: 170; Index 1.06  Ankle (PT): 153; Index 0.95  Ankle (DP): 144;  Index 0.89  Digit: 86; Index 0.53     LEFT (mmHg)  Brachial: 161  High Thigh: 128; Index 0.80  Low Thigh: 127; Index 0.79  Calf: 104; Index 0.65  Ankle (PT): 90; Index 0.56  Ankle (DP): 67; Index 0.42  Digit: 44; Index 0.27     IMPRESSION:   1.  RIGHT LOWER EXTREMITY: Normal resting NANCY. Segmental limb pressures suggest the presence of obstructive disease within the femoropopliteal segment, which appears well compensated at rest, given the normal resting NANCY.  2.  LEFT LOWER EXTREMITY: Moderate resting ischemia. Segmental limb pressures suggest the presence of obstructive disease within the inflow and femoropopliteal segments.    Patient Active Problem List   Diagnosis   ? Tinea Pedis   ? Patellar Chondromalacia   ? A Fall Due To Slipping, Tripping, Or Stumbling   ? Dysplastic Nevus   ? Dyspepsia   ? Upper Back Pain (Between Shoulder Blades)   ? Diverticulosis   ? Cellulitis   ? Benign Adenomatous Polyp Of The Large Intestine   ? Chronic Obstructive Pulmonary Disease   ? Acute Colitis   ? Esophageal Reflux   ? Dermatitis   ? Joint Pain, Localized In The Shoulder   ? Tibia/fibula fracture   ? Fracture of tibia and fibula   ? Acute bronchitis with bronchospasm   ? Smoking addiction       ASSESSMENT:  63F with symptoms and findings typical for LLE critical limb ischemia.      PLAN:  CTA A/P with runoff will be done today to assess for aneurysmal disease and extent of PAD. We will decide with this whether she would be appropriate for endovascular treatment or open bypass. She will also get vein mapping during this visit. She has just been started on a statin which is appropriate and should start taking ASA 81.      Linda Parker MD  Vascular Surgery Fellow  [facility]

## 2021-07-03 NOTE — ADDENDUM NOTE
Addendum Note by Thiago Luu MD at 3/10/2017 11:51 AM     Author: Thiago Luu MD Service: -- Author Type: Physician    Filed: 3/10/2017 11:51 AM Encounter Date: 3/10/2017 Status: Signed    : Thiago Luu MD (Physician)    Addended by: THIAGO LUU on: 3/10/2017 11:51 AM        Modules accepted: Orders

## 2021-07-03 NOTE — ADDENDUM NOTE
Addendum Note by Thiago Luu MD at 3/10/2017 12:04 PM     Author: Thiago Luu MD Service: -- Author Type: Physician    Filed: 3/10/2017 12:04 PM Encounter Date: 3/10/2017 Status: Signed    : Thiago Luu MD (Physician)    Addended by: THIAGO LUU on: 3/10/2017 12:04 PM        Modules accepted: Orders

## 2021-07-21 ENCOUNTER — RECORDS - HEALTHEAST (OUTPATIENT)
Dept: ADMINISTRATIVE | Facility: CLINIC | Age: 66
End: 2021-07-21

## 2021-07-22 ENCOUNTER — RECORDS - HEALTHEAST (OUTPATIENT)
Dept: LAB | Facility: CLINIC | Age: 66
End: 2021-07-22

## 2021-07-22 DIAGNOSIS — Z12.31 OTHER SCREENING MAMMOGRAM: ICD-10-CM

## 2021-09-19 ENCOUNTER — HEALTH MAINTENANCE LETTER (OUTPATIENT)
Age: 66
End: 2021-09-19

## 2022-01-09 ENCOUNTER — HEALTH MAINTENANCE LETTER (OUTPATIENT)
Age: 67
End: 2022-01-09

## 2022-02-17 PROBLEM — I65.23 BILATERAL CAROTID ARTERY STENOSIS: Status: ACTIVE | Noted: 2019-11-25

## 2022-04-01 ENCOUNTER — OFFICE VISIT (OUTPATIENT)
Dept: FAMILY MEDICINE | Facility: CLINIC | Age: 67
End: 2022-04-01
Payer: COMMERCIAL

## 2022-04-01 VITALS
DIASTOLIC BLOOD PRESSURE: 82 MMHG | WEIGHT: 158 LBS | SYSTOLIC BLOOD PRESSURE: 158 MMHG | HEART RATE: 96 BPM | BODY MASS INDEX: 27.12 KG/M2 | OXYGEN SATURATION: 97 % | TEMPERATURE: 97.6 F

## 2022-04-01 DIAGNOSIS — Z12.31 VISIT FOR SCREENING MAMMOGRAM: ICD-10-CM

## 2022-04-01 DIAGNOSIS — L40.8 LOCALIZED PUSTULAR PSORIASIS: ICD-10-CM

## 2022-04-01 DIAGNOSIS — Z80.0 FAMILY HISTORY OF COLORECTAL CANCER: ICD-10-CM

## 2022-04-01 DIAGNOSIS — J30.9 ALLERGIC SHINERS: ICD-10-CM

## 2022-04-01 DIAGNOSIS — Z11.4 SCREENING FOR HIV (HUMAN IMMUNODEFICIENCY VIRUS): ICD-10-CM

## 2022-04-01 DIAGNOSIS — Z13.1 SCREENING FOR DIABETES MELLITUS: ICD-10-CM

## 2022-04-01 DIAGNOSIS — Z11.59 NEED FOR HEPATITIS C SCREENING TEST: ICD-10-CM

## 2022-04-01 DIAGNOSIS — F43.20 ADJUSTMENT DISORDER, UNSPECIFIED TYPE: Primary | ICD-10-CM

## 2022-04-01 LAB
ALBUMIN SERPL-MCNC: 4.3 G/DL (ref 3.5–5)
ALBUMIN UR-MCNC: 30 MG/DL
ALP SERPL-CCNC: 89 U/L (ref 45–120)
ALT SERPL W P-5'-P-CCNC: 49 U/L (ref 0–45)
ANION GAP SERPL CALCULATED.3IONS-SCNC: 14 MMOL/L (ref 5–18)
APPEARANCE UR: CLEAR
AST SERPL W P-5'-P-CCNC: 38 U/L (ref 0–40)
BACTERIA #/AREA URNS HPF: ABNORMAL /HPF
BILIRUB SERPL-MCNC: 0.5 MG/DL (ref 0–1)
BILIRUB UR QL STRIP: NEGATIVE
BUN SERPL-MCNC: 16 MG/DL (ref 8–22)
CALCIUM SERPL-MCNC: 10.3 MG/DL (ref 8.5–10.5)
CHLORIDE BLD-SCNC: 101 MMOL/L (ref 98–107)
CO2 SERPL-SCNC: 26 MMOL/L (ref 22–31)
COLOR UR AUTO: YELLOW
CREAT SERPL-MCNC: 0.81 MG/DL (ref 0.6–1.1)
ERYTHROCYTE [DISTWIDTH] IN BLOOD BY AUTOMATED COUNT: 12.5 % (ref 10–15)
GFR SERPL CREATININE-BSD FRML MDRD: 81 ML/MIN/1.73M2
GLUCOSE BLD-MCNC: 91 MG/DL (ref 70–125)
GLUCOSE UR STRIP-MCNC: NEGATIVE MG/DL
HCT VFR BLD AUTO: 45.4 % (ref 35–47)
HGB BLD-MCNC: 14.6 G/DL (ref 11.7–15.7)
HGB UR QL STRIP: ABNORMAL
HIV 1+2 AB+HIV1 P24 AG SERPL QL IA: NEGATIVE
HYALINE CASTS #/AREA URNS LPF: ABNORMAL /LPF
KETONES UR STRIP-MCNC: NEGATIVE MG/DL
LEUKOCYTE ESTERASE UR QL STRIP: NEGATIVE
MCH RBC QN AUTO: 30.2 PG (ref 26.5–33)
MCHC RBC AUTO-ENTMCNC: 32.2 G/DL (ref 31.5–36.5)
MCV RBC AUTO: 94 FL (ref 78–100)
MUCOUS THREADS #/AREA URNS LPF: PRESENT /LPF
NITRATE UR QL: NEGATIVE
PH UR STRIP: 5.5 [PH] (ref 5–8)
PLATELET # BLD AUTO: 287 10E3/UL (ref 150–450)
POTASSIUM BLD-SCNC: 5.1 MMOL/L (ref 3.5–5)
PROT SERPL-MCNC: 7.6 G/DL (ref 6–8)
RBC # BLD AUTO: 4.83 10E6/UL (ref 3.8–5.2)
RBC #/AREA URNS AUTO: ABNORMAL /HPF
SODIUM SERPL-SCNC: 141 MMOL/L (ref 136–145)
SP GR UR STRIP: >=1.03 (ref 1–1.03)
SQUAMOUS #/AREA URNS AUTO: ABNORMAL /LPF
TSH SERPL DL<=0.005 MIU/L-ACNC: 1.46 UIU/ML (ref 0.3–5)
UROBILINOGEN UR STRIP-ACNC: 0.2 E.U./DL
VIT B12 SERPL-MCNC: 463 PG/ML (ref 213–816)
WBC # BLD AUTO: 13.6 10E3/UL (ref 4–11)
WBC #/AREA URNS AUTO: ABNORMAL /HPF

## 2022-04-01 PROCEDURE — 82525 ASSAY OF COPPER: CPT | Mod: 90 | Performed by: FAMILY MEDICINE

## 2022-04-01 PROCEDURE — 82607 VITAMIN B-12: CPT | Performed by: FAMILY MEDICINE

## 2022-04-01 PROCEDURE — 85027 COMPLETE CBC AUTOMATED: CPT | Performed by: FAMILY MEDICINE

## 2022-04-01 PROCEDURE — 99214 OFFICE O/P EST MOD 30 MIN: CPT | Mod: 25 | Performed by: FAMILY MEDICINE

## 2022-04-01 PROCEDURE — 90715 TDAP VACCINE 7 YRS/> IM: CPT | Performed by: FAMILY MEDICINE

## 2022-04-01 PROCEDURE — 90471 IMMUNIZATION ADMIN: CPT | Performed by: FAMILY MEDICINE

## 2022-04-01 PROCEDURE — 82306 VITAMIN D 25 HYDROXY: CPT | Performed by: FAMILY MEDICINE

## 2022-04-01 PROCEDURE — 84443 ASSAY THYROID STIM HORMONE: CPT | Performed by: FAMILY MEDICINE

## 2022-04-01 PROCEDURE — 81001 URINALYSIS AUTO W/SCOPE: CPT | Performed by: FAMILY MEDICINE

## 2022-04-01 PROCEDURE — 87389 HIV-1 AG W/HIV-1&-2 AB AG IA: CPT | Performed by: FAMILY MEDICINE

## 2022-04-01 PROCEDURE — 99000 SPECIMEN HANDLING OFFICE-LAB: CPT | Performed by: FAMILY MEDICINE

## 2022-04-01 PROCEDURE — 86803 HEPATITIS C AB TEST: CPT | Performed by: FAMILY MEDICINE

## 2022-04-01 PROCEDURE — 36415 COLL VENOUS BLD VENIPUNCTURE: CPT | Performed by: FAMILY MEDICINE

## 2022-04-01 PROCEDURE — 80053 COMPREHEN METABOLIC PANEL: CPT | Performed by: FAMILY MEDICINE

## 2022-04-01 PROCEDURE — 0054A COVID-19,PF,PFIZER (12+ YRS): CPT | Performed by: FAMILY MEDICINE

## 2022-04-01 PROCEDURE — 86364 TISS TRNSGLTMNASE EA IG CLAS: CPT | Performed by: FAMILY MEDICINE

## 2022-04-01 PROCEDURE — 91305 COVID-19,PF,PFIZER (12+ YRS): CPT | Performed by: FAMILY MEDICINE

## 2022-04-01 RX ORDER — BUPROPION HYDROCHLORIDE 150 MG/1
150 TABLET ORAL EVERY MORNING
Qty: 90 TABLET | Refills: 1 | Status: SHIPPED | OUTPATIENT
Start: 2022-04-01 | End: 2022-07-18

## 2022-04-01 RX ORDER — CLOBETASOL PROPIONATE 0.5 MG/G
OINTMENT TOPICAL 2 TIMES DAILY
Qty: 60 G | Refills: 1 | Status: SHIPPED | OUTPATIENT
Start: 2022-04-01 | End: 2024-02-07

## 2022-04-01 NOTE — PROGRESS NOTES
ASSESSMENT & PLAN    No problem-specific Assessment & Plan notes found for this encounter.      Amirah was seen today for lipids.    Diagnoses and all orders for this visit:    Adjustment disorder, unspecified type  -     Adult Mental Health  Referral; Future  -     TSH with free T4 reflex; Future  -     Copper level; Future  -     CBC with platelets; Future  -     Comprehensive metabolic panel (BMP + Alb, Alk Phos, ALT, AST, Total. Bili, TP); Future  -     Tissue transglutaminase jenelle IgA and IgG; Future  -     buPROPion (WELLBUTRIN XL) 150 MG 24 hr tablet; Take 1 tablet (150 mg) by mouth every morning    Screening for HIV (human immunodeficiency virus)  -     HIV Antigen Antibody Combo; Future    Need for hepatitis C screening test  -     Hepatitis C Screen Reflex to HCV RNA Quant and Genotype; Future    Visit for screening mammogram  -     MA SCREENING DIGITAL BILAT - Future  (s+30); Future    Allergic shiners  -     TSH with free T4 reflex; Future  -     Vitamin B12; Future  -     Vitamin D Deficiency; Future  -     Copper level; Future  -     CBC with platelets; Future  -     Comprehensive metabolic panel (BMP + Alb, Alk Phos, ALT, AST, Total. Bili, TP); Future  -     Tissue transglutaminase jenelle IgA and IgG; Future    Localized pustular psoriasis  -     clobetasol (TEMOVATE) 0.05 % external ointment; Apply topically 2 times daily To palms until resolved and use sparingly    Screening for diabetes mellitus  -     UA with Microscopic reflex to Culture - lab collect; Future    Other orders  -     REVIEW OF HEALTH MAINTENANCE PROTOCOL ORDERS  -     Cancel: ZOSTER VACCINE RECOMBINANT (Shingrix)  -     TDAP VACCINE (Adacel, Boostrix)  [[id number]]  -     Cancel: INFLUENZA QUAD, RECOMBINANT, P-FREE (RIV4) (FLUBLOK) [TXR912]  -     COVID-19,PF,PFIZER (12+ Yrs GRAY LABEL)        Patient Instructions   Thanks for coming in currently    We are doing blood work today  We are doing urine today      I would like  you to do your tetanus shot  I would like you to do a COVID booster    Start bupropion  mg  Take 1 daily  This may cause some symptoms of GI upset for the first 1 to 2 weeks    I would like you to see a therapist    I would like you to plan for intermittent activities with Joseph  Talk through how you would like to help set up your home and divided from the workplace    Consider doing couples therapy    You are going to be due for colonoscopy this year              No follow-ups on file.       PATIENT HEALTH QUESTIONNAIRE-9 (PHQ - 9)    Over the last 2 weeks, how often have you been bothered by any of the following problems?    1. Little interest or pleasure in doing things -      2. Feeling down, depressed, or hopeless -      3. Trouble falling or staying asleep, or sleeping too much -     4. Feeling tired or having little energy -      5. Poor appetite or overeating -      6. Feeling bad about yourself - or that you are a failure or have let yourself or your family down -      7. Trouble concentrating on things, such as reading the newspaper or watching television -     8. Moving or speaking so slowly that other people could have noticed? Or the opposite - being so fidgety or restless that you have been moving around a lot more than usual     9. Thoughts that you would be better off dead or of hurting  yourself in some way     Total Score:       If you checked off any problems, how difficult have these problems made it for you to do your work, take care of things at home, or get along with other people?      Developed by Rebecca Coulter, Renetta Payne, Darion Junior and colleagues, with an educational catherine from [employer] No permission required to reproduce, translate, display or distribute. permission required to reproduce, translate, display or distribute.    CHIEF COMPLAINT: Amirah Shea had concerns including Lipids.    [place]: 1.............. SUBJECTIVE:  Amirah Shea  is a 66 year old female had concerns including Lipids.    1. Adjustment disorder, unspecified type    2. Screening for HIV (human immunodeficiency virus)    3. Need for hepatitis C screening test    4. Visit for screening mammogram    5. Allergic shiners    6. Localized pustular psoriasis    7. Screening for diabetes mellitus      Irritable  Angry  Disabilities no visit    Having more issues with intimacy with her partner    Her partner feels that he is working on insurance  Cecy pinpoint why    Brother passed away from Covid    Sister has colon cancer    No Known Allergies                      SOCIAL: She  reports that she has been smoking cigarettes. She has smoked for the past 50.00 years. She has never used smokeless tobacco. She reports current alcohol use of about 2.0 standard drinks of alcohol per week. She reports that she does not use drugs.    REVIEW OF SYSTEMS:   Family history not pertinent to chief complaint or presenting problem    Review of systems otherwise negative as requested from patient, except   Those positive ROS outlined and discussed in [place].      VITALS:  Vitals:    04/01/22 0912   BP: (!) 158/82   Pulse: 96   Temp: 97.6  F (36.4  C)   SpO2: 97%   Weight: 71.7 kg (158 lb)     Wt Readings from Last 3 Encounters:   04/01/22 71.7 kg (158 lb)   11/22/19 67.9 kg (149 lb 12 oz)   10/02/19 67.6 kg (149 lb)     Body mass index is 27.12 kg/m .    Physical Exam:  Oropharynx is clear  TMs scarring on the right  No cervical supraclavicular nodes  Lungs are clear with slight wheeze with increased exhale  Cardiac no murmur appreciable    Repeat blood pressure check 136/70     I spent 20 minutes with this patient.  This includes pre-visit, intra-visit and post visit work an evaluation with regards to Amirah was seen today for lipids.    Diagnoses and all orders for this visit:    Adjustment disorder, unspecified type  -     Adult Mental Health  Referral; Future  -     TSH with free T4  reflex; Future  -     Copper level; Future  -     CBC with platelets; Future  -     Comprehensive metabolic panel (BMP + Alb, Alk Phos, ALT, AST, Total. Bili, TP); Future  -     Tissue transglutaminase jenelle IgA and IgG; Future  -     buPROPion (WELLBUTRIN XL) 150 MG 24 hr tablet; Take 1 tablet (150 mg) by mouth every morning    Screening for HIV (human immunodeficiency virus)  -     HIV Antigen Antibody Combo; Future    Need for hepatitis C screening test  -     Hepatitis C Screen Reflex to HCV RNA Quant and Genotype; Future    Visit for screening mammogram  -     MA SCREENING DIGITAL BILAT - Future  (s+30); Future    Allergic shiners  -     TSH with free T4 reflex; Future  -     Vitamin B12; Future  -     Vitamin D Deficiency; Future  -     Copper level; Future  -     CBC with platelets; Future  -     Comprehensive metabolic panel (BMP + Alb, Alk Phos, ALT, AST, Total. Bili, TP); Future  -     Tissue transglutaminase jenelle IgA and IgG; Future    Localized pustular psoriasis  -     clobetasol (TEMOVATE) 0.05 % external ointment; Apply topically 2 times daily To palms until resolved and use sparingly    Screening for diabetes mellitus  -     UA with Microscopic reflex to Culture - lab collect; Future    Other orders  -     REVIEW OF HEALTH MAINTENANCE PROTOCOL ORDERS  -     Cancel: ZOSTER VACCINE RECOMBINANT (Shingrix)  -     TDAP VACCINE (Adacel, Boostrix)  [[id number]]  -     Cancel: INFLUENZA QUAD, RECOMBINANT, P-FREE (RIV4) (FLUBLOK) [[id number]]  -     COVID-19,PF,PFIZER (12+ Yrs GRAY LABEL)        Hector Luu MD  [facility] [address] [phone]

## 2022-04-01 NOTE — PATIENT INSTRUCTIONS
Thanks for coming in currently    We are doing blood work today  We are doing urine today      I would like you to do your tetanus shot  I would like you to do a COVID booster    Start bupropion  mg  Take 1 daily  This may cause some symptoms of GI upset for the first 1 to 2 weeks    I would like you to see a therapist    I would like you to plan for intermittent activities with Joseph  Talk through how you would like to help set up your home and divided from the workplace    Consider doing couples therapy    You are going to be due for colonoscopy this year  Let us connect by video visit in 1 month's time    Stay active    I would like you to consider going  Gluten-free  Dairy free  See how this impacts your overall health is well as your psoriasis    Given you a prescription for clobetasol this can be used on your palms twice daily until resolved and sparingly    DanL

## 2022-04-03 LAB
DEPRECATED CALCIDIOL+CALCIFEROL SERPL-MC: 74 UG/L (ref 20–75)
HCV AB SERPL QL IA: NONREACTIVE

## 2022-04-04 LAB
TTG IGA SER-ACNC: 0.5 U/ML
TTG IGG SER-ACNC: 0.9 U/ML

## 2022-04-05 LAB — COPPER SERPL-MCNC: 134 UG/DL

## 2022-04-06 DIAGNOSIS — I77.9 CAROTID ARTERY DISEASE, UNSPECIFIED LATERALITY (H): ICD-10-CM

## 2022-04-06 DIAGNOSIS — I73.9 PERIPHERAL VASCULAR DISEASE (H): Primary | ICD-10-CM

## 2022-04-06 RX ORDER — ROSUVASTATIN CALCIUM 10 MG/1
10 TABLET, COATED ORAL DAILY
Qty: 90 TABLET | Refills: 3 | Status: SHIPPED | OUTPATIENT
Start: 2022-04-06 | End: 2022-11-11

## 2022-04-09 DIAGNOSIS — R79.9 ABNORMAL BLOOD CHEMISTRY: ICD-10-CM

## 2022-04-09 DIAGNOSIS — R31.29 MICROSCOPIC HEMATURIA: ICD-10-CM

## 2022-04-09 DIAGNOSIS — D72.829 LEUKOCYTOSIS, UNSPECIFIED TYPE: Primary | ICD-10-CM

## 2022-04-18 ENCOUNTER — ANCILLARY PROCEDURE (OUTPATIENT)
Dept: MAMMOGRAPHY | Facility: CLINIC | Age: 67
End: 2022-04-18
Attending: FAMILY MEDICINE
Payer: COMMERCIAL

## 2022-04-18 DIAGNOSIS — Z12.31 VISIT FOR SCREENING MAMMOGRAM: ICD-10-CM

## 2022-04-18 PROCEDURE — 77067 SCR MAMMO BI INCL CAD: CPT | Mod: TC | Performed by: RADIOLOGY

## 2022-04-23 DIAGNOSIS — I73.9 PERIPHERAL VASCULAR DISEASE (H): Primary | ICD-10-CM

## 2022-04-23 DIAGNOSIS — R53.83 FATIGUE, UNSPECIFIED TYPE: ICD-10-CM

## 2022-04-23 DIAGNOSIS — E78.5 DYSLIPIDEMIA: ICD-10-CM

## 2022-05-02 ENCOUNTER — VIRTUAL VISIT (OUTPATIENT)
Dept: FAMILY MEDICINE | Facility: CLINIC | Age: 67
End: 2022-05-02
Payer: COMMERCIAL

## 2022-05-02 DIAGNOSIS — R06.00 DYSPNEA, UNSPECIFIED TYPE: ICD-10-CM

## 2022-05-02 DIAGNOSIS — E78.5 DYSLIPIDEMIA: ICD-10-CM

## 2022-05-02 DIAGNOSIS — I73.9 PERIPHERAL VASCULAR DISEASE (H): Primary | ICD-10-CM

## 2022-05-02 DIAGNOSIS — F17.200 SMOKING ADDICTION: ICD-10-CM

## 2022-05-02 DIAGNOSIS — R53.83 FATIGUE, UNSPECIFIED TYPE: ICD-10-CM

## 2022-05-02 DIAGNOSIS — D72.829 LEUKOCYTOSIS, UNSPECIFIED TYPE: ICD-10-CM

## 2022-05-02 PROCEDURE — 99213 OFFICE O/P EST LOW 20 MIN: CPT | Mod: TEL | Performed by: FAMILY MEDICINE

## 2022-05-02 RX ORDER — METOPROLOL SUCCINATE 50 MG/1
50 TABLET, EXTENDED RELEASE ORAL DAILY
Qty: 2 TABLET | Refills: 0 | Status: SHIPPED | OUTPATIENT
Start: 2022-05-02 | End: 2022-06-02

## 2022-05-02 NOTE — PATIENT INSTRUCTIONS
Nicotine replacement     Do the CT angiogram     Keep up the exercise and push it a little every day     Recheck 1 months blood counts     Rohith

## 2022-05-02 NOTE — PROGRESS NOTES
"Amirah is a 66 year old who is being evaluated via a billable telephone visit.      What phone number would you like to be contacted at? [phone]  How would you like to obtain your AVS? [place]    Problem List Items Addressed This Visit        Behavioral    Smoking addiction     Nicotine replacement   Walking              Other Visit Diagnoses     Peripheral vascular disease (H)    -  Primary    Dyslipidemia        Fatigue, unspecified type        Leukocytosis, unspecified type        Dyspnea, unspecified type        Relevant Medications    metoprolol succinate ER (TOPROL-XL) 50 MG 24 hr tablet    Other Relevant Orders    CT Angiogram coronary artery          Patient Instructions     Nicotine replacement     Do the CT angiogram     Keep up the exercise and push it a little every day     Rohith      Samir Macario is a 66 year old who presents for the following health issues    HPI     High Cholesterol   Crestor 10 mg     Blood White High     Repeat that in 1-4 weeks     CT angiogram   Any symptoms   Some symptoms \"just get tired\"  Joseph and I \"only go so far\"   Left calf ? \"Circulation\"?    Yoga     Wellbutrin   Nicotine replacement         Baloon Surgery   Functional       Exam Date Exam Time Exam Date Exam Time Accession # Performing Department Results    12/30/19  7:52 AM 12/30/19 10:31 AM [id number] [facility]        [id number]           PACS Images     Show images for NM NURSE PHARM STRESS         Study Result    Narrative & Impression      The nuclear stress test is negative for inducible myocardial ischemia or infarction.     The left ventricular ejection fraction at stress is 54%.     The patient is at a low risk of future cardiac ischemic events.     There is no prior study for comparison.             Review of Systems           Objective           Vitals:  No vitals were obtained today due to virtual visit.    Physical Exam           Phone call " duration: 15  minutes

## 2022-05-16 ENCOUNTER — LAB (OUTPATIENT)
Dept: LAB | Facility: CLINIC | Age: 67
End: 2022-05-16
Payer: COMMERCIAL

## 2022-05-16 DIAGNOSIS — R79.9 ABNORMAL BLOOD CHEMISTRY: ICD-10-CM

## 2022-05-16 DIAGNOSIS — R31.29 MICROSCOPIC HEMATURIA: ICD-10-CM

## 2022-05-16 DIAGNOSIS — D72.829 LEUKOCYTOSIS, UNSPECIFIED TYPE: ICD-10-CM

## 2022-05-16 LAB
ALBUMIN SERPL-MCNC: 4.3 G/DL (ref 3.5–5)
ALBUMIN UR-MCNC: NEGATIVE MG/DL
ALP SERPL-CCNC: 70 U/L (ref 45–120)
ALT SERPL W P-5'-P-CCNC: 99 U/L (ref 0–45)
ANION GAP SERPL CALCULATED.3IONS-SCNC: 10 MMOL/L (ref 5–18)
APPEARANCE UR: CLEAR
AST SERPL W P-5'-P-CCNC: 70 U/L (ref 0–40)
BACTERIA #/AREA URNS HPF: ABNORMAL /HPF
BILIRUB SERPL-MCNC: 0.6 MG/DL (ref 0–1)
BILIRUB UR QL STRIP: NEGATIVE
BUN SERPL-MCNC: 15 MG/DL (ref 8–22)
CALCIUM SERPL-MCNC: 10 MG/DL (ref 8.5–10.5)
CHLORIDE BLD-SCNC: 104 MMOL/L (ref 98–107)
CO2 SERPL-SCNC: 27 MMOL/L (ref 22–31)
COLOR UR AUTO: YELLOW
CREAT SERPL-MCNC: 0.86 MG/DL (ref 0.6–1.1)
ERYTHROCYTE [DISTWIDTH] IN BLOOD BY AUTOMATED COUNT: 12.4 % (ref 10–15)
GFR SERPL CREATININE-BSD FRML MDRD: 75 ML/MIN/1.73M2
GLUCOSE BLD-MCNC: 89 MG/DL (ref 70–125)
GLUCOSE UR STRIP-MCNC: NEGATIVE MG/DL
HCT VFR BLD AUTO: 42.6 % (ref 35–47)
HGB BLD-MCNC: 13.7 G/DL (ref 11.7–15.7)
HGB UR QL STRIP: NEGATIVE
KETONES UR STRIP-MCNC: NEGATIVE MG/DL
LEUKOCYTE ESTERASE UR QL STRIP: NEGATIVE
MCH RBC QN AUTO: 29.8 PG (ref 26.5–33)
MCHC RBC AUTO-ENTMCNC: 32.2 G/DL (ref 31.5–36.5)
MCV RBC AUTO: 93 FL (ref 78–100)
NITRATE UR QL: NEGATIVE
PH UR STRIP: 5.5 [PH] (ref 5–8)
PLATELET # BLD AUTO: 272 10E3/UL (ref 150–450)
POTASSIUM BLD-SCNC: 5 MMOL/L (ref 3.5–5)
PROT SERPL-MCNC: 7.6 G/DL (ref 6–8)
RBC # BLD AUTO: 4.6 10E6/UL (ref 3.8–5.2)
RBC #/AREA URNS AUTO: ABNORMAL /HPF
SODIUM SERPL-SCNC: 141 MMOL/L (ref 136–145)
SP GR UR STRIP: 1.02 (ref 1–1.03)
SQUAMOUS #/AREA URNS AUTO: ABNORMAL /LPF
UROBILINOGEN UR STRIP-ACNC: 0.2 E.U./DL
WBC # BLD AUTO: 10 10E3/UL (ref 4–11)
WBC #/AREA URNS AUTO: ABNORMAL /HPF

## 2022-05-16 PROCEDURE — 81001 URINALYSIS AUTO W/SCOPE: CPT

## 2022-05-16 PROCEDURE — 80053 COMPREHEN METABOLIC PANEL: CPT

## 2022-05-16 PROCEDURE — 36415 COLL VENOUS BLD VENIPUNCTURE: CPT

## 2022-05-16 PROCEDURE — 85027 COMPLETE CBC AUTOMATED: CPT

## 2022-05-25 ENCOUNTER — HOSPITAL ENCOUNTER (OUTPATIENT)
Dept: CT IMAGING | Facility: CLINIC | Age: 67
Discharge: HOME | End: 2022-05-25
Attending: FAMILY MEDICINE | Admitting: FAMILY MEDICINE
Payer: COMMERCIAL

## 2022-05-25 VITALS
HEART RATE: 58 BPM | SYSTOLIC BLOOD PRESSURE: 136 MMHG | HEIGHT: 64 IN | DIASTOLIC BLOOD PRESSURE: 62 MMHG | BODY MASS INDEX: 26.98 KG/M2 | WEIGHT: 158 LBS

## 2022-05-25 DIAGNOSIS — R06.00 DYSPNEA, UNSPECIFIED TYPE: ICD-10-CM

## 2022-05-25 PROCEDURE — 250N000009 HC RX 250: Performed by: FAMILY MEDICINE

## 2022-05-25 PROCEDURE — 75574 CT ANGIO HRT W/3D IMAGE: CPT | Mod: 26 | Performed by: INTERNAL MEDICINE

## 2022-05-25 PROCEDURE — 0503T CT FFR: CPT

## 2022-05-25 PROCEDURE — 75574 CT ANGIO HRT W/3D IMAGE: CPT

## 2022-05-25 PROCEDURE — 0504T CT FFR: CPT | Performed by: INTERNAL MEDICINE

## 2022-05-25 PROCEDURE — 250N000013 HC RX MED GY IP 250 OP 250 PS 637: Performed by: FAMILY MEDICINE

## 2022-05-25 PROCEDURE — 250N000011 HC RX IP 250 OP 636: Performed by: FAMILY MEDICINE

## 2022-05-25 RX ORDER — IOPAMIDOL 755 MG/ML
100 INJECTION, SOLUTION INTRAVASCULAR ONCE
Status: COMPLETED | OUTPATIENT
Start: 2022-05-25 | End: 2022-05-25

## 2022-05-25 RX ORDER — DILTIAZEM HYDROCHLORIDE 5 MG/ML
10 INJECTION INTRAVENOUS
Status: DISCONTINUED | OUTPATIENT
Start: 2022-05-25 | End: 2022-05-26 | Stop reason: HOSPADM

## 2022-05-25 RX ORDER — DILTIAZEM HYDROCHLORIDE 5 MG/ML
5 INJECTION INTRAVENOUS
Status: DISCONTINUED | OUTPATIENT
Start: 2022-05-25 | End: 2022-05-26 | Stop reason: HOSPADM

## 2022-05-25 RX ORDER — METOPROLOL TARTRATE 1 MG/ML
5 INJECTION, SOLUTION INTRAVENOUS
Status: DISCONTINUED | OUTPATIENT
Start: 2022-05-25 | End: 2022-05-26 | Stop reason: HOSPADM

## 2022-05-25 RX ORDER — NITROGLYCERIN 0.4 MG/1
0.4 TABLET SUBLINGUAL ONCE
Status: COMPLETED | OUTPATIENT
Start: 2022-05-25 | End: 2022-05-25

## 2022-05-25 RX ADMIN — LIDOCAINE HYDROCHLORIDE 0.1 ML: 10 INJECTION, SOLUTION EPIDURAL; INFILTRATION; INTRACAUDAL; PERINEURAL at 08:03

## 2022-05-25 RX ADMIN — NITROGLYCERIN 0.4 MG: 0.4 TABLET SUBLINGUAL at 08:06

## 2022-05-25 RX ADMIN — IOPAMIDOL 100 ML: 755 INJECTION, SOLUTION INTRAVENOUS at 08:10

## 2022-05-26 LAB — BSA FOR ECHO PROCEDURE: 0 M2

## 2022-05-27 ENCOUNTER — MYC MEDICAL ADVICE (OUTPATIENT)
Dept: FAMILY MEDICINE | Facility: CLINIC | Age: 67
End: 2022-05-27
Payer: COMMERCIAL

## 2022-05-27 DIAGNOSIS — R06.00 DYSPNEA, UNSPECIFIED TYPE: ICD-10-CM

## 2022-05-27 DIAGNOSIS — I25.119 CORONARY ARTERY DISEASE INVOLVING NATIVE CORONARY ARTERY OF NATIVE HEART WITH ANGINA PECTORIS (H): ICD-10-CM

## 2022-05-27 DIAGNOSIS — I73.9 PERIPHERAL VASCULAR DISEASE (H): Primary | ICD-10-CM

## 2022-05-27 DIAGNOSIS — E78.5 DYSLIPIDEMIA: ICD-10-CM

## 2022-05-31 NOTE — TELEPHONE ENCOUNTER
Routing to PCP to review/advise on next steps for patient.    Sabi Little RN on 5/31/2022 at 2:30 PM

## 2022-06-02 ENCOUNTER — OFFICE VISIT (OUTPATIENT)
Dept: CARDIOLOGY | Facility: CLINIC | Age: 67
End: 2022-06-02
Attending: FAMILY MEDICINE
Payer: COMMERCIAL

## 2022-06-02 VITALS
RESPIRATION RATE: 16 BRPM | BODY MASS INDEX: 27.36 KG/M2 | WEIGHT: 159.4 LBS | HEART RATE: 81 BPM | DIASTOLIC BLOOD PRESSURE: 64 MMHG | SYSTOLIC BLOOD PRESSURE: 122 MMHG

## 2022-06-02 DIAGNOSIS — F17.200 SMOKING ADDICTION: Primary | ICD-10-CM

## 2022-06-02 DIAGNOSIS — R06.00 DYSPNEA, UNSPECIFIED TYPE: ICD-10-CM

## 2022-06-02 DIAGNOSIS — I25.119 CORONARY ARTERY DISEASE INVOLVING NATIVE CORONARY ARTERY OF NATIVE HEART WITH ANGINA PECTORIS (H): ICD-10-CM

## 2022-06-02 DIAGNOSIS — I73.9 PERIPHERAL VASCULAR DISEASE (H): ICD-10-CM

## 2022-06-02 DIAGNOSIS — E78.5 DYSLIPIDEMIA: ICD-10-CM

## 2022-06-02 PROCEDURE — 99204 OFFICE O/P NEW MOD 45 MIN: CPT | Performed by: INTERNAL MEDICINE

## 2022-06-02 NOTE — PROGRESS NOTES
[facility] Office Consult     Assessment:   (I25.119) Coronary artery disease involving native coronary artery of native heart with angina pectoris (H)  Comment: Coronary CTA with flow reserve suggestive of distal LAD lesion, accuracy in his distal bed is not good and small vessel distribution.  Patient has no symptoms.  However, given her vascular disease as well as high cholesterol we will arrange for exercise stress nuclear and if medium or large sized area of ischemia seen would not need to pursue invasive angiography.  In any event, we will continue risk factor modification.    (F17.200) Smoking addiction  (primary encounter diagnosis)  Comment: She stopped smoking but using a fair amount of inhaled nicotine, strongly recommend discontinuing this best as possible.    (I73.9) Peripheral vascular disease (H)  Comment: Has had stenosis with total occlusion of the left SFA treated with simple balloon angioplasty but now having repeat claudication.  Given this we will check ankle-brachial indices and refer him to vascular surgery if need be.    (R06.00) Dyspnea, unspecified type  Comment: Most likely secondary to smoking, doubt ischemic but will perform exercise stress nuclear as above.    (E78.5) Dyslipidemia  Comment: Has not had lipids checked since 2019 at which point time the total cholesterol is 142 with an LDL of 70 and would recommend fasting lipids.     Plan:   1.  Decrease inhaled nicotine usage.  2.  Fasting lipid profile.  3.  Ankle-brachial indices.  4.  Exercise stress nuclear.  5.  Follow-up as needed pending results of above.    History of Present Illness:   Thank you for asking the [facility] team to see Amirah IAN J Carlos Oakley a 66 year old female  in consultation  to evaluate abnormal coronary CTA fractional flow reserve.   Patient has had claudication involving her left lower extremity and in 2019 when balloon angioplasty of the left SFA.  She has been seen by her  primary physician and noted to have increased fatigue with increased shortness of breath on activity.  She is unable to walk and keep up with her .  Due to this the primary physician obtained a coronary CTA with fractional flow reserve which suggested a very distal possible LAD significant stenosis.  She is referred to rapid access clinic to discuss this further.  Other than shortness of breath, she denies any chest pain, syncope, dizziness, palpitations, PND, orthopnea or peripheral edema.    Past Medical History:   Peripheral vascular disease left lower extremity  Pure hypercholesterolemia  Tobacco use  Adjustment disorder    Past history is negative for cancer, tuberculosis, diabetes mellitus, myocardial infarction,  rheumatic fever, hypertension, cerebrovascular accident, chronic kidney disease, peptic ulcer disease, chronic obstructive pulmonary disease, or thyroid disorder.    Past Surgical History:     Past Surgical History:   Procedure Laterality Date     HC OPEN RX WEIGHTBEAR TIB,FIX TIB ONLY Left 11/27/2015    Procedure: OPEN REDUCTION INTERNAL FIXATION LEFT TIBIAL FIBULAR FRACTURE;  Surgeon: Roberto Zepeda MD;  Location: [facility];  Service: Orthopedics     HC REMOVAL GALLBLADDER      Description: Cholecystectomy;  Recorded: 01/08/2010;     HYSTERECTOMY       IR EXTREMITY ANGIOGRAM LEFT  9/17/2019     IR EXTREMITY ANGIOGRAM LEFT  9/17/2019     OOPHORECTOMY       ZZC APPENDECTOMY      Description: Appendectomy;  Recorded: 01/08/2010;     ZZC PART REMOVAL COLON W ANASTOMOSIS      Description: Partial Colectomy;  Recorded: 03/11/2014;     ZZC TOTAL ABDOM HYSTERECTOMY      Description: Total Abdominal Hysterectomy;  Recorded: 01/08/2010;  Comments: with BSO     Family History:   Family history positive for coronary artery disease with sister having stenting in her late 40s and brother having stenting in his 50s.    Social History:   She lives at home independently with her , reports  that she has been smoking cigarettes. She has smoked for the past 50.00 years, but now is no longer smoking and is using inhaled smokeless tobacco. She reports current alcohol use of about 2.0 standard drinks of alcohol per week. She reports that she does not use drugs. The primary care physician is Hector Luu    Meds:   Scheduled Meds:  Current Outpatient Medications   Medication Sig Dispense Refill     Ascorbic Acid (VITAMIN C PO) Take 500 mg by mouth daily       aspirin 81 MG EC tablet [ASPIRIN 81 MG EC TABLET] Take 81 mg by mouth daily.       buPROPion (WELLBUTRIN XL) 150 MG 24 hr tablet Take 1 tablet (150 mg) by mouth every morning 90 tablet 1     cholecalciferol, vitamin D3, (D3-50 CHOLECALCIFEROL) 50,000 unit capsule [CHOLECALCIFEROL, VITAMIN D3, (D3-50 CHOLECALCIFEROL) 50,000 UNIT CAPSULE] Take 2,000 Units by mouth daily.              clobetasol (TEMOVATE) 0.05 % external ointment Apply topically 2 times daily To palms until resolved and use sparingly 60 g 1     co-enzyme Q-10 30 mg capsule [CO-ENZYME Q-10 30 MG CAPSULE] Take 200 mg by mouth daily.              ibuprofen (ADVIL,MOTRIN) 200 MG tablet [IBUPROFEN (ADVIL,MOTRIN) 200 MG TABLET] Take 200 mg by mouth every 6 (six) hours as needed for pain.       nicotine (NICOTROL) 10 MG inhaler Use 1 cartridge as needed for urge to smoke by puffing over course of 20min.  Use 6-16 cart/day; reduce number of cart/day over 6-12 weeks. 168 each 11     rosuvastatin (CRESTOR) 10 MG tablet Take 1 tablet (10 mg) by mouth daily 90 tablet 3     VITAMIN E PO Take 1 tablet by mouth daily 400 units         PRN Meds:.    Allergies:   Patient has no known allergies.    Objective:      Physical Exam  72.3 kg (159 lb 6.4 oz)     Body mass index is 27.36 kg/m .  /64 (BP Location: Right arm, Patient Position: Sitting, Cuff Size: Adult Regular)   Pulse 81   Resp 16   Wt 72.3 kg (159 lb 6.4 oz)   BMI 27.36 kg/m      General Appearance:   Alert, cooperative and in no  acute distress.   HEENT:  No scleral icterus; the mucous membranes were pink and moist.   Neck: JVP normal. No thyromegaly. No HJR   Chest: The spine was straight. The chest was symmetric.   Lungs:   Respirations unlabored; the lungs are clear to auscultation.   Cardiovascular:   S1 and S2 without murmur, clicks or rubs. Brachial, radial, carotid normal and posterior tibial pulses are 1+ intact and symetrical.  No carotid bruits noted   Abdomen:  No organomegaly, masses, bruits, or tenderness. Bowels sounds are present   Extremities: No cyanosis, clubbing, or edema.   Skin: No xanthelasma.   Neurologic: Mood and affect are appropriate.         Lab Reviewed Personally by myself  Lab Results   Component Value Date     05/16/2022    CO2 27 05/16/2022    BUN 15 05/16/2022     Lab Results   Component Value Date    WBC 10.0 05/16/2022    HGB 13.7 05/16/2022    HCT 42.6 05/16/2022    MCV 93 05/16/2022     05/16/2022     Lab Results   Component Value Date    CHOL 142 11/26/2019    TRIG 104 11/26/2019    HDL 51 11/26/2019     No results found for: BNP    ECG personally reviewed by myself shows sinus rhythm, within normal limits.     Review of Systems:     Review of Systems:   Enc Vitals  BP: 122/64  Pulse: 81  Resp: 16  Weight: 72.3 kg (159 lb 6.4 oz)

## 2022-06-02 NOTE — LETTER
6/2/2022    Hector Luu MD  [address]  [place] [address]    RE: Amirah SOSA J Carlos Oakley       Dear Colleague,     I had the pleasure of seeing Amirah Oakley in the [facility].    [facility] Heart Care Office Consult     Assessment:   (I25.119) Coronary artery disease involving native coronary artery of native heart with angina pectoris (H)  Comment: Coronary CTA with flow reserve suggestive of distal LAD lesion, accuracy in his distal bed is not good and small vessel distribution.  Patient has no symptoms.  However, given her vascular disease as well as high cholesterol we will arrange for exercise stress nuclear and if medium or large sized area of ischemia seen would not need to pursue invasive angiography.  In any event, we will continue risk factor modification.    (F17.200) Smoking addiction  (primary encounter diagnosis)  Comment: She stopped smoking but using a fair amount of inhaled nicotine, strongly recommend discontinuing this best as possible.    (I73.9) Peripheral vascular disease (H)  Comment: Has had stenosis with total occlusion of the left SFA treated with simple balloon angioplasty but now having repeat claudication.  Given this we will check ankle-brachial indices and refer him to vascular surgery if need be.    (R06.00) Dyspnea, unspecified type  Comment: Most likely secondary to smoking, doubt ischemic but will perform exercise stress nuclear as above.    (E78.5) Dyslipidemia  Comment: Has not had lipids checked since 2019 at which point time the total cholesterol is 142 with an LDL of 70 and would recommend fasting lipids.     Plan:   1.  Decrease inhaled nicotine usage.  2.  Fasting lipid profile.  3.  Ankle-brachial indices.  4.  Exercise stress nuclear.  5.  Follow-up as needed pending results of above.    History of Present Illness:   Thank you for asking the [facility] Heart Care team to see Amirah IAN Oakley a 66 year old  female  in consultation  to evaluate abnormal coronary CTA fractional flow reserve.   Patient has had claudication involving her left lower extremity and in 2019 when balloon angioplasty of the left SFA.  She has been seen by her primary physician and noted to have increased fatigue with increased shortness of breath on activity.  She is unable to walk and keep up with her .  Due to this the primary physician obtained a coronary CTA with fractional flow reserve which suggested a very distal possible LAD significant stenosis.  She is referred to rapid access clinic to discuss this further.  Other than shortness of breath, she denies any chest pain, syncope, dizziness, palpitations, PND, orthopnea or peripheral edema.    Past Medical History:   Peripheral vascular disease left lower extremity  Pure hypercholesterolemia  Tobacco use  Adjustment disorder    Past history is negative for cancer, tuberculosis, diabetes mellitus, myocardial infarction,  rheumatic fever, hypertension, cerebrovascular accident, chronic kidney disease, peptic ulcer disease, chronic obstructive pulmonary disease, or thyroid disorder.    Past Surgical History:     Past Surgical History:   Procedure Laterality Date     HC OPEN RX WEIGHTBEAR TIB,FIX TIB ONLY Left 11/27/2015    Procedure: OPEN REDUCTION INTERNAL FIXATION LEFT TIBIAL FIBULAR FRACTURE;  Surgeon: Roberto Zepeda MD;  Location: [facility];  Service: Orthopedics     HC REMOVAL GALLBLADDER      Description: Cholecystectomy;  Recorded: 01/08/2010;     HYSTERECTOMY       IR EXTREMITY ANGIOGRAM LEFT  9/17/2019     IR EXTREMITY ANGIOGRAM LEFT  9/17/2019     OOPHORECTOMY       Z APPENDECTOMY      Description: Appendectomy;  Recorded: 01/08/2010;     Z PART REMOVAL COLON W ANASTOMOSIS      Description: Partial Colectomy;  Recorded: 03/11/2014;     Z TOTAL ABDOM HYSTERECTOMY      Description: Total Abdominal Hysterectomy;  Recorded: 01/08/2010;  Comments: with BSO      Family History:   Family history positive for coronary artery disease with sister having stenting in her late 40s and brother having stenting in his 50s.    Social History:   She lives at home independently with her , reports that she has been smoking cigarettes. She has smoked for the past 50.00 years, but now is no longer smoking and is using inhaled smokeless tobacco. She reports current alcohol use of about 2.0 standard drinks of alcohol per week. She reports that she does not use drugs. The primary care physician is Hector Luu    Meds:   Scheduled Meds:  Current Outpatient Medications   Medication Sig Dispense Refill     Ascorbic Acid (VITAMIN C PO) Take 500 mg by mouth daily       aspirin 81 MG EC tablet [ASPIRIN 81 MG EC TABLET] Take 81 mg by mouth daily.       buPROPion (WELLBUTRIN XL) 150 MG 24 hr tablet Take 1 tablet (150 mg) by mouth every morning 90 tablet 1     cholecalciferol, vitamin D3, (D3-50 CHOLECALCIFEROL) 50,000 unit capsule [CHOLECALCIFEROL, VITAMIN D3, (D3-50 CHOLECALCIFEROL) 50,000 UNIT CAPSULE] Take 2,000 Units by mouth daily.              clobetasol (TEMOVATE) 0.05 % external ointment Apply topically 2 times daily To palms until resolved and use sparingly 60 g 1     co-enzyme Q-10 30 mg capsule [CO-ENZYME Q-10 30 MG CAPSULE] Take 200 mg by mouth daily.              ibuprofen (ADVIL,MOTRIN) 200 MG tablet [IBUPROFEN (ADVIL,MOTRIN) 200 MG TABLET] Take 200 mg by mouth every 6 (six) hours as needed for pain.       nicotine (NICOTROL) 10 MG inhaler Use 1 cartridge as needed for urge to smoke by puffing over course of 20min.  Use 6-16 cart/day; reduce number of cart/day over 6-12 weeks. 168 each 11     rosuvastatin (CRESTOR) 10 MG tablet Take 1 tablet (10 mg) by mouth daily 90 tablet 3     VITAMIN E PO Take 1 tablet by mouth daily 400 units         PRN Meds:.    Allergies:   Patient has no known allergies.    Objective:      Physical Exam  72.3 kg (159 lb 6.4 oz)     Body mass  index is 27.36 kg/m .  /64 (BP Location: Right arm, Patient Position: Sitting, Cuff Size: Adult Regular)   Pulse 81   Resp 16   Wt 72.3 kg (159 lb 6.4 oz)   BMI 27.36 kg/m      General Appearance:   Alert, cooperative and in no acute distress.   HEENT:  No scleral icterus; the mucous membranes were pink and moist.   Neck: JVP normal. No thyromegaly. No HJR   Chest: The spine was straight. The chest was symmetric.   Lungs:   Respirations unlabored; the lungs are clear to auscultation.   Cardiovascular:   S1 and S2 without murmur, clicks or rubs. Brachial, radial, carotid normal and posterior tibial pulses are 1+ intact and symetrical.  No carotid bruits noted   Abdomen:  No organomegaly, masses, bruits, or tenderness. Bowels sounds are present   Extremities: No cyanosis, clubbing, or edema.   Skin: No xanthelasma.   Neurologic: Mood and affect are appropriate.         Lab Reviewed Personally by myself  Lab Results   Component Value Date     05/16/2022    CO2 27 05/16/2022    BUN 15 05/16/2022     Lab Results   Component Value Date    WBC 10.0 05/16/2022    HGB 13.7 05/16/2022    HCT 42.6 05/16/2022    MCV 93 05/16/2022     05/16/2022     Lab Results   Component Value Date    CHOL 142 11/26/2019    TRIG 104 11/26/2019    HDL 51 11/26/2019     No results found for: BNP    ECG personally reviewed by myself shows sinus rhythm, within normal limits.     Review of Systems:     Review of Systems:   Enc Vitals  BP: 122/64  Pulse: 81  Resp: 16  Weight: 72.3 kg (159 lb 6.4 oz)                                              Thank you for allowing me to participate in the care of your patient.      Sincerely,     ROSSY STAHL MD     [facility]  cc:   Hector Luu MD  [address] [place] [address]

## 2022-06-02 NOTE — PATIENT INSTRUCTIONS
Ms. Amirah Oakley,  It certainly was nice to meet you today.  Per our conversation you have some fatigue and shortness of breath and thiis could represent heart blockages altho the CAT scan is a little inaccurate in the distal bed.  This is  the reason I am checking the stress test with exercise and radiation of the heart.  Given the balloon in the left leg I will check the NANCY.  l you the results of these tests.   Esteban Benson

## 2022-06-15 ENCOUNTER — HOSPITAL ENCOUNTER (OUTPATIENT)
Dept: NUCLEAR MEDICINE | Facility: HOSPITAL | Age: 67
Discharge: HOME | End: 2022-06-15
Attending: INTERNAL MEDICINE
Payer: COMMERCIAL

## 2022-06-15 ENCOUNTER — HOSPITAL ENCOUNTER (OUTPATIENT)
Dept: CARDIOLOGY | Facility: HOSPITAL | Age: 67
Discharge: HOME | End: 2022-06-15
Attending: INTERNAL MEDICINE
Payer: COMMERCIAL

## 2022-06-15 DIAGNOSIS — I73.9 PERIPHERAL VASCULAR DISEASE (H): ICD-10-CM

## 2022-06-15 DIAGNOSIS — I25.119 CORONARY ARTERY DISEASE INVOLVING NATIVE CORONARY ARTERY OF NATIVE HEART WITH ANGINA PECTORIS (H): ICD-10-CM

## 2022-06-15 LAB
CV BLOOD PRESSURE: 66 MMHG
CV STRESS CURRENT BP HE: NORMAL
CV STRESS CURRENT HR HE: 113
CV STRESS CURRENT HR HE: 114
CV STRESS CURRENT HR HE: 116
CV STRESS CURRENT HR HE: 123
CV STRESS CURRENT HR HE: 135
CV STRESS CURRENT HR HE: 137
CV STRESS CURRENT HR HE: 140
CV STRESS CURRENT HR HE: 141
CV STRESS CURRENT HR HE: 141
CV STRESS CURRENT HR HE: 72
CV STRESS CURRENT HR HE: 76
CV STRESS CURRENT HR HE: 79
CV STRESS CURRENT HR HE: 82
CV STRESS CURRENT HR HE: 86
CV STRESS CURRENT HR HE: 87
CV STRESS CURRENT HR HE: 89
CV STRESS CURRENT HR HE: 91
CV STRESS DEVIATION TIME HE: NORMAL
CV STRESS ECHO PERCENT HR HE: NORMAL
CV STRESS EXERCISE STAGE HE: NORMAL
CV STRESS EXERCISE STAGE REACHED HE: NORMAL
CV STRESS FINAL RESTING BP HE: NORMAL
CV STRESS FINAL RESTING HR HE: 76
CV STRESS MAX HR HE: 141
CV STRESS MAX TREADMILL GRADE HE: 10
CV STRESS MAX TREADMILL SPEED HE: 1.7
CV STRESS PEAK DIA BP HE: NORMAL
CV STRESS PEAK SYS BP HE: NORMAL
CV STRESS PHASE HE: NORMAL
CV STRESS PROTOCOL HE: NORMAL
CV STRESS RESTING PT POSITION HE: NORMAL
CV STRESS RESTING PT POSITION HE: NORMAL
CV STRESS ST DEVIATION AMOUNT HE: NORMAL
CV STRESS ST DEVIATION ELEVATION HE: NORMAL
CV STRESS ST EVELATION AMOUNT HE: NORMAL
CV STRESS TEST TYPE HE: NORMAL
CV STRESS TOTAL STAGE TIME MIN 1 HE: NORMAL
RATE PRESSURE PRODUCT: NORMAL
STRESS ECHO BASELINE DIASTOLIC HE: 82
STRESS ECHO BASELINE HR: 70
STRESS ECHO BASELINE SYSTOLIC BP: 156
STRESS ECHO CALCULATED PERCENT HR: 90 %
STRESS ECHO LAST STRESS DIASTOLIC BP: 82
STRESS ECHO LAST STRESS HR: 141
STRESS ECHO LAST STRESS SYSTOLIC BP: 202
STRESS ECHO POST ESTIMATED WORKLOAD: 4.7
STRESS ECHO POST EXERCISE DUR MIN: 3
STRESS ECHO POST EXERCISE DUR SEC: 54
STRESS ECHO TARGET HR: 156

## 2022-06-15 PROCEDURE — 343N000001 HC RX 343: Performed by: INTERNAL MEDICINE

## 2022-06-15 PROCEDURE — A9500 TC99M SESTAMIBI: HCPCS | Performed by: INTERNAL MEDICINE

## 2022-06-15 PROCEDURE — 78452 HT MUSCLE IMAGE SPECT MULT: CPT | Mod: 26 | Performed by: INTERNAL MEDICINE

## 2022-06-15 PROCEDURE — 78452 HT MUSCLE IMAGE SPECT MULT: CPT

## 2022-06-15 PROCEDURE — 93018 CV STRESS TEST I&R ONLY: CPT | Performed by: INTERNAL MEDICINE

## 2022-06-15 PROCEDURE — 93017 CV STRESS TEST TRACING ONLY: CPT

## 2022-06-15 RX ADMIN — Medication 33.6 MILLICURIE: at 09:48

## 2022-06-15 RX ADMIN — Medication 8.52 MCI.: at 07:52

## 2022-06-16 ENCOUNTER — TELEPHONE (OUTPATIENT)
Dept: CARDIOLOGY | Facility: CLINIC | Age: 67
End: 2022-06-16

## 2022-06-16 DIAGNOSIS — R94.39 ABNORMAL CARDIOVASCULAR STRESS TEST: Primary | ICD-10-CM

## 2022-06-16 NOTE — TELEPHONE ENCOUNTER
----- Message from Esteban Benson MD sent at 6/15/2022  5:03 PM CDT -----  Please inform this patient who I saw once for an abnormal coronary CTA that she does have a small area of ischemia, I am somewhat concerned that may be since she did not exercise that long her risk is greater.  Given this, I would like invasive angiography with possible intervention.  Could be tier two.  LF            ==called patient and discussed abnormal stress test results. Spoke with her and her , kasey. They verbalized understanding and are agreeable. They will do a home covid test 1-2 days prior. They would like to schedule into the last week of June or early July if able due to travel. Case request placed- no alerts. -[facility] [place]       Bladimir Keating!  Dot phrase does not work but here is the info for this case request I just placed!  Thanks,  Jarad Sparrow poss pci  Dr. Benson ordering  H&P 6/2- they are aware they may need a new one depending  No alerts  Tier 2   Covid test will be home test

## 2022-06-17 PROBLEM — I25.10 CORONARY ARTERY DISEASE INVOLVING NATIVE CORONARY ARTERY OF NATIVE HEART: Status: ACTIVE | Noted: 2022-06-17

## 2022-06-23 NOTE — TELEPHONE ENCOUNTER
Personal reminder  Andrea Willett Mallory J, RN; P [facility] Procedure  Pool - L[facility]  Caller: Unspecified (1 week ago, 12:05 PM)  [facility] POSS PCI WITH MA/MY     630AM ADMIT ON 7/11     H&P: PMD - PT WILL SCHEDULE     NO ALERTS     AT HOME COVID TEST WILL BE COMPLETED BY PT 2 DAYS PRIOR     THANKS!   -ANDREA pichardo sent-[facility] [place]

## 2022-06-25 ASSESSMENT — PATIENT HEALTH QUESTIONNAIRE - PHQ9
SUM OF ALL RESPONSES TO PHQ QUESTIONS 1-9: 3
SUM OF ALL RESPONSES TO PHQ QUESTIONS 1-9: 3
10. IF YOU CHECKED OFF ANY PROBLEMS, HOW DIFFICULT HAVE THESE PROBLEMS MADE IT FOR YOU TO DO YOUR WORK, TAKE CARE OF THINGS AT HOME, OR GET ALONG WITH OTHER PEOPLE: NOT DIFFICULT AT ALL

## 2022-06-26 ENCOUNTER — HEALTH MAINTENANCE LETTER (OUTPATIENT)
Age: 67
End: 2022-06-26

## 2022-06-27 ENCOUNTER — TELEPHONE (OUTPATIENT)
Dept: CARDIOLOGY | Facility: CLINIC | Age: 67
End: 2022-06-27

## 2022-06-27 NOTE — TELEPHONE ENCOUNTER
[facility] Call Center    Phone Message    May a detailed message be left on voicemail: yes     Reason for Call: Other: Amirah calling to report that she got a letter from her Insurance company denying the coverage for the Angiogram as they state in the letter that they don't believe it's medically necessary.  Please review and call Amirah back to discuss.  She is scheduled for her Pre-Op tomorrow and is wondering if she should wait in case it gets cancelled.    Action Taken: Message routed to:  Other: Cardiology    Travel Screening: Not Applicable                                                                Noted message. Called patient and reassured her that [facility] did a peer to peer review and it was approved. She should receive notification of this in the mail in the coming days. This was approved on 6/20 and is under the referral tab. She verbalized understanding. [place] [place]

## 2022-06-28 ENCOUNTER — OFFICE VISIT (OUTPATIENT)
Dept: FAMILY MEDICINE | Facility: CLINIC | Age: 67
End: 2022-06-28
Payer: COMMERCIAL

## 2022-06-28 VITALS
HEIGHT: 65 IN | HEART RATE: 84 BPM | WEIGHT: 159.5 LBS | OXYGEN SATURATION: 98 % | RESPIRATION RATE: 18 BRPM | DIASTOLIC BLOOD PRESSURE: 58 MMHG | BODY MASS INDEX: 26.57 KG/M2 | TEMPERATURE: 98 F | SYSTOLIC BLOOD PRESSURE: 140 MMHG

## 2022-06-28 DIAGNOSIS — Z87.891 PERSONAL HISTORY OF TOBACCO USE, PRESENTING HAZARDS TO HEALTH: ICD-10-CM

## 2022-06-28 DIAGNOSIS — I25.10 CORONARY ARTERY DISEASE INVOLVING NATIVE CORONARY ARTERY OF NATIVE HEART WITHOUT ANGINA PECTORIS: ICD-10-CM

## 2022-06-28 DIAGNOSIS — Z90.722 HISTORY OF BILATERAL OOPHORECTOMIES: ICD-10-CM

## 2022-06-28 DIAGNOSIS — Z78.0 MENOPAUSE: ICD-10-CM

## 2022-06-28 DIAGNOSIS — Z01.818 PREOP EXAMINATION: Primary | ICD-10-CM

## 2022-06-28 PROCEDURE — 99214 OFFICE O/P EST MOD 30 MIN: CPT | Performed by: FAMILY MEDICINE

## 2022-06-28 NOTE — PROGRESS NOTES
[facility]  [address] [place] [address]  Phone: [phone]  Fax: [phone]  Primary Provider: Hector Luu  Pre-op Performing Provider: JOSE R STEVEN      PREOPERATIVE EVALUATION:  Today's date: 6/28/2022    Amirah Oakley is a 66 year old female who presents for a preoperative evaluation.    Surgical Information:  Surgery/Procedure: Angiogram  Surgery Location: [facility]  Surgeon: Dr Nielson  Surgery Date: 07/11/22  Time of Surgery: TBD  Where patient plans to recover: At home with family  Fax number for surgical facility: Note does not need to be faxed, will be available electronically in Epic.    Type of Anesthesia Anticipated: to be determined    Assessment & Plan     The proposed surgical procedure is considered INTERMEDIATE risk.    Preop examination    Coronary artery disease involving native coronary artery of native heart without angina pectoris  Reason for procedure    Personal history of tobacco use, presenting hazards to health  - CT Chest Lung Cancer Scrn Low Dose wo    History of bilateral oophorectomies/ Menopause  - DX Hip/Pelvis/Spine    Medication Instructions:  Patient is to take all scheduled medications on the day of surgery EXCEPT for modifications listed below:    - aspirin - cardiologist is to call her about hold   - ibuprofen - hold 3 days prior to surgery    RECOMMENDATION:  APPROVAL GIVEN to proceed with proposed procedure, without further diagnostic evaluation.      Subjective     HPI related to upcoming procedure: Amirah has a history of peripheral artery disease - that's why she had a hard time on treadmill.  The symptoms that lead to cardiac testing are Hard time breathing sometimes and fatigue, as well as family history   - Dad had massive heart attack age 45   - Sister had heart attack at 47   - two brothers have had stents placed    Here is her CT angiogram 5/25/22  Dyspnea, unspecified type [R06.00  (ICD-10-CM)]       Interpretation Summary      Scattered plaque in the proximal to mid LAD with mild luminal stenosis. Less than or equal to 25 to 49%. No obstructive stenosis observed. FFR analysis suggests low likelihood of lesion specific ischemia in the proximal, mid and early distal portion of the left anterior descending.    The very distal portion of the left anterior descending is not well visualized. FFR analysis suggests the potential for significant flow limitation limited to the distal left anterior descending. This suggests the possibility of lesion specific ischemia in the distal LAD    Mild nonobstructive disease in the proximal circumflex. Less than or equal to 25 to 49%. FFR analysis suggests low likelihood of lesion specific ischemia in the circumflex or major obtuse marginal branch.    Right coronary artery is dominant. There is plaque in the proximal and mid RCA with mild luminal stenosis. FFR analysis in the right coronary artery demonstrates a low likelihood of lesion specific ischemia in the right coronary artery.    Moderate plaque in the descending thoracic aorta.    Please see separate report for radiology for additional findings.            Preop Questions 6/25/2022   1. Have you ever had a heart attack or stroke? No   2. Have you ever had surgery on your heart or blood vessels, such as a stent placement, a coronary artery bypass, or surgery on an artery in your head, neck, heart, or legs? YES - balloon procedure left leg   3. Do you have chest pain with activity? No   4. Do you have a history of  heart failure? No   5. Do you currently have a cold, bronchitis or symptoms of other infection? No   6. Do you have a cough, shortness of breath, or wheezing? No   7. Do you or anyone in your family have previous history of blood clots? No   8. Do you or does anyone in your family have a serious bleeding problem such as prolonged bleeding following surgeries or cuts? No   9. Have you ever had  problems with anemia or been told to take iron pills? No   10. Have you had any abnormal blood loss such as black, tarry or bloody stools, or abnormal vaginal bleeding? No   11. Have you ever had a blood transfusion? No   12. Are you willing to have a blood transfusion if it is medically needed before, during, or after your surgery? Yes   13. Have you or any of your relatives ever had problems with anesthesia? YES - she would get sick previously with anesthesia  - not in a while (40 years ago)   14. Do you have sleep apnea, excessive snoring or daytime drowsiness? No   15. Do you have any artifical heart valves or other implanted medical devices like a pacemaker, defibrillator, or continuous glucose monitor? No   16. Do you have artificial joints? No   17. Are you allergic to latex? No       Health Care Directive:  Patient does not have a Health Care Directive or Living Will: Discussed advance care planning with patient; information given to patient to review.    Preoperative Review of :   reviewed - no record of controlled substances prescribed.     :[id number]}  Answers for HPI/ROS submitted by the patient on 6/25/2022  If you checked off any problems, how difficult have these problems made it for you to do your work, take care of things at home, or get along with other people?: Not difficult at all  PHQ9 TOTAL SCORE: 3    Lung Cancer Screening Shared Decision Making Visit     Amirah IAN Whitman Cele is eligible for lung cancer screening on the basis of:   has not experienced symptoms suggestive of lung cancer.   born on 1955, 66 year old years old.   smoked 1 packs of cigarettes for 50 years for a total of 50 pack-years   has quit smoking 1 month ago      I have discussed with patient the risks and benefits of screening for lung cancer with low-dose CT.     The risks include:   radiation exposure    false positives     over-diagnosis    The benefit of early detection of lung cancer is contingent upon  adherence to annual screening or more frequent follow up if indicated.     Furthermore, reaping the benefits of screening requires Amirah Oakley to be willing and able to undergo diagnostic procedures, if indicated.     We did discuss that the only way to prevent lung cancer is to not smoke. Smoking cessation assistance was not offered as she has already quit.    Review of Systems   Constitutional: negative for recent illness or change in weight  Eyes: negative for irritation and vision change  Ears, nose, mouth, throat, and face: negative for nasal congestion and sore throat  Gastrointestinal: negative for abdominal pain and change in bowel habits  Genitourinary:negative for dysuria, frequency and hesitancy  Neurological: negative for dizziness, headaches and paresthesia    Patient Active Problem List    Diagnosis Date Noted     Coronary artery disease involving native coronary artery of native heart  Abnormal Nuclear Stress 6/2022    FFR analysis suggests the potential for significant flow limitation limited to the distal le 06/17/2022     Priority: Yrn Fernandez MD  [phone] 5/25/2022 Routine     Narrative & Impression    Scattered plaque in the proximal to mid LAD with mild luminal stenosis. Less than or equal to 25 to 49%. No obstructive stenosis observed. FFR analysis suggests low likelihood of lesion specific ischemia in the proximal, mid and early distal portion of the left anterior descending.    The very distal portion of the left anterior descending is not well visualized. FFR analysis suggests the potential for significant flow limitation limited to the distal left anterior descending. This suggests the possibility of lesion specific ischemia in the distal LAD    Mild nonobstructive disease in the proximal circumflex. Less than or equal to 25 to 49%. FFR analysis suggests low likelihood of lesion specific ischemia in the circumflex or major obtuse marginal branch.    Right  coronary artery is dominant. There is plaque in the proximal and mid RCA with mild luminal stenosis. FFR analysis in the right coronary artery demonstrates a low likelihood of lesion specific ischemia in the right coronary artery.    Moderate plaque in the descending thoracic aorta.    Please see separate report for radiology for additional findings.           Family history of colorectal cancer 04/01/2022     Priority: Medium     Bilateral carotid artery stenosis 11/25/2019     Priority: Medium     Narrative & Impression     EXAM: US CAROTID BILATERAL  LOCATION: [facility]  DATE/TIME: 11/25/2019 12:41 PM  INDICATION: Right carotid bruit  COMPARISON: None.  TECHNIQUE: Duplex exam performed utilizing 2D gray-scale imaging, Doppler   interrogation with color-flow and spectral waveform analysis.  FINDINGS:  RIGHT: There is moderate atheromatous plaque. Normal waveforms with no   significant stenosis.  LEFT: There is moderate atheromatous plaque. Normal waveforms with no   significant stenosis.  Both vertebral arteries and subclavian artery waveforms are normal.  VELOCITY CHART:  The following velocities were obtained in the RIGHT carotid system.  CCA: 89/31 cm/s  ICA: 151/50 cm/s  ECA: 129/19 cm/s  ICA/CCA: PS 1.7  The following velocities were obtained in the LEFT carotid system.  CCA: 102/34 cm/s  ICA: 136/41 cm/s  ECA: 180/37 cm/s  ICA/CCA: PS 1.3  IMPRESSION:   1. RIGHT: 50-69% stenosis of the right ICA..  2. LEFT: Borderline 50-69% stenosis of the left ICA.  Evaluation based on velocities and NASCET criteria.          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     Priority: Medium     Derm Soles         Mixed simple and mucopurulent chronic bronchitis (H)      Priority: Medium     Created by Conversion         Atherosclerosis of native artery of left lower extremity with rest pain (H)  Left Angioplasty SFA 9/17/2019      Priority: Medium     Microscopic hematuria 11/09/2019     Priority: Medium     Cysto  10/2019 neg metro uro         Diverticulosis      Priority: Medium     Created by Conversion  Replacement Utility updated for latest IMO load         Smoking addiction 11/28/2015     Priority: Medium     Fracture of tibia and fibula 11/27/2015     Priority: Medium     Dermatitis      Priority: Medium     Created by Conversion         Joint Pain, Localized In The Shoulder      Priority: Medium     Created by Conversion         Dysplastic Nevus      Priority: Medium     Created by Conversion         Benign Adenomatous Polyp Of The Large Intestine      Priority: Medium     Created by Conversion         Esophageal Reflux      Priority: Medium     Created by Conversion          Past Medical History:   Diagnosis Date     Acute bronchitis with bronchospasm 11/28/2015     Patellar Chondromalacia     Created by Conversion      Tibia/fibula fracture 11/27/2015     Upper Back Pain (Between Shoulder Blades)     Created by Conversion      Past Surgical History:   Procedure Laterality Date     HC OPEN RX WEIGHTBEAR TIB,FIX TIB ONLY Left 11/27/2015    Procedure: OPEN REDUCTION INTERNAL FIXATION LEFT TIBIAL FIBULAR FRACTURE;  Surgeon: Roberto Zepeda MD;  Location: [facility];  Service: Orthopedics     HC REMOVAL GALLBLADDER  1984    Description: Cholecystectomy;  Recorded: 01/08/2010;     HYSTERECTOMY  2002     IR EXTREMITY ANGIOGRAM LEFT  09/17/2019     IR EXTREMITY ANGIOGRAM LEFT  09/17/2019     OOPHORECTOMY  2002     ZZC APPENDECTOMY  1984    Description: Appendectomy;  Recorded: 01/08/2010;     ZZC PART REMOVAL COLON W ANASTOMOSIS      Description: Partial Colectomy;  Recorded: 03/11/2014;     Current Outpatient Medications   Medication Sig Dispense Refill     Ascorbic Acid (VITAMIN C PO) Take 500 mg by mouth daily       aspirin 81 MG EC tablet [ASPIRIN 81 MG EC TABLET] Take 81 mg by mouth daily.       buPROPion (WELLBUTRIN XL) 150 MG 24 hr tablet Take 1 tablet (150 mg) by mouth every morning 90 tablet 1      cholecalciferol, vitamin D3, (D3-50 CHOLECALCIFEROL) 50,000 unit capsule [CHOLECALCIFEROL, VITAMIN D3, (D3-50 CHOLECALCIFEROL) 50,000 UNIT CAPSULE] Take 2,000 Units by mouth daily.              clobetasol (TEMOVATE) 0.05 % external ointment Apply topically 2 times daily To palms until resolved and use sparingly 60 g 1     co-enzyme Q-10 30 mg capsule [CO-ENZYME Q-10 30 MG CAPSULE] Take 200 mg by mouth daily.              ibuprofen (ADVIL,MOTRIN) 200 MG tablet [IBUPROFEN (ADVIL,MOTRIN) 200 MG TABLET] Take 200 mg by mouth every 6 (six) hours as needed for pain.       VITAMIN E PO Take 1 tablet by mouth daily 400 units       rosuvastatin (CRESTOR) 10 MG tablet Take 1 tablet (10 mg) by mouth daily (Patient not taking: Reported on 2022) 90 tablet 3       No Known Allergies     Social History     Tobacco Use     Smoking status: Former Smoker     Packs/day: 1.00     Years: 50.00     Pack years: 50.00     Types: Cigarettes     Quit date: 2022     Years since quittin.1     Smokeless tobacco: Never Used   Substance Use Topics     Alcohol use: Yes     Alcohol/week: 2.0 standard drinks     Family History   Problem Relation Age of Onset     Atrial fibrillation Mother      Thyroid Disease Mother      Alzheimer Disease Mother      Myocardial Infarction Mother      Cancer Father      Myocardial Infarction Father      Heart Disease Sister      Heart Disease Sister      Cancer Sister         Rectal 63      Clotting Disorder Sister      Cancer Brother         started in kidny - metastasis     Heart Disease Brother      Ovarian Cancer Maternal Aunt      Cancer Maternal Aunt         lung     Cancer Maternal Aunt         lung     Hereditary Breast and Ovarian Cancer Syndrome No family hx of      Breast Cancer No family hx of      Endometrial Cancer No family hx of      History   Drug Use No         Objective     BP (!) 140/58 (BP Location: Left arm, Patient Position: Sitting, Cuff Size: Adult Regular)   Pulse 84   Temp 98  " F (36.7  C) (Oral)   Resp 18   Ht 1.638 m (5' 4.5\")   Wt 72.3 kg (159 lb 8 oz)   SpO2 98%   BMI 26.96 kg/m      Physical Exam  General appearance - alert, well appearing, and in no distress  Mental status - normal mood, behavior, speech, dress, motor activity, and thought processes  Eyes - pupils equal and reactive, extraocular eye movements intact, funduscopic exam normal, discs flat and sharp  Ears - bilateral TM's and external ear canals normal  Mouth - mucous membranes moist, pharynx normal without lesions  Neck - supple, no significant adenopathy, carotids upstroke normal bilaterally, no bruits, thyroid exam: thyroid is normal in size without nodules or tenderness  Chest - clear to auscultation, no wheezes, rales or rhonchi, symmetric air entry  Heart - normal rate, regular rhythm, normal S1, S2, no murmurs, rubs, clicks or gallops  Abdomen - soft, nontender, nondistended, no masses or organomegaly  Neurological - alert, oriented, normal speech, no focal findings or movement disorder noted, DTR's normal and symmetric  Extremities - peripheral pulses normal, no pedal edema, no clubbing or cyanosis  Skin - no rashes or worrisome lesions      Recent Labs   Lab Test 05/16/22  1526 04/01/22  1023   HGB 13.7 14.6    287    141   POTASSIUM 5.0 5.1*   CR 0.86 0.81       Revised Cardiac Risk Index (RCRI):  The patient has the following serious cardiovascular risks for perioperative complications:   - Coronary Artery Disease (MI, positive stress test, angina, Qs on EKG) = 1 point     RCRI Interpretation: 0 points: Class I (very low risk - 0.4% complication rate)           Signed Electronically by: Susan Tierney MD  Copy of this evaluation report is provided to requesting physician.      "

## 2022-06-28 NOTE — PATIENT INSTRUCTIONS
" Our goal is to discuss this at least every five years with all patients starting at age 18.  Advanced directives are a document that lets us know who talks for you and what your desires are in a medical situation where you are unable to talk for yourself    Here is a useful web site that includes a link to the honoring choices form (under how do I document my choices?):     [email or web address]    The three must haves for this form are  1) who speaks for you (health care agent if you cannot speak for yourself, and what 'goodrich' they have  2) what interventions you want if yo are permanently unconscious  3) making this legal - either by notary, or by two signatures of witnesses, neither of who is your health care agent    There are other parts to the form that are optional    No rush for this! Just something we are supposed to discuss every so often.    If you complete the form, and can make a pdf of it, you can send me a message with that pdf attached and it will become part of your chart here; if you cannot make an electronic copy to send by [employer], mailing the document is also fine.  You might also consider putting it on your fridge under \"Emergency Instructions.,\" as well as having a copy for yourself and your family.    Please let me know if you have any questions.    Susan Tierney MD   "

## 2022-06-28 NOTE — H&P (VIEW-ONLY)
[facility]  [address] [place] [address]  Phone: [phone]  Fax: [phone]  Primary Provider: Hector Luu  Pre-op Performing Provider: JOSE R STEVEN      PREOPERATIVE EVALUATION:  Today's date: 6/28/2022    Amirah Oakley is a 66 year old female who presents for a preoperative evaluation.    Surgical Information:  Surgery/Procedure: Angiogram  Surgery Location: [facility]  Surgeon: Dr Nielson  Surgery Date: 07/11/22  Time of Surgery: TBD  Where patient plans to recover: At home with family  Fax number for surgical facility: Note does not need to be faxed, will be available electronically in Epic.    Type of Anesthesia Anticipated: to be determined    Assessment & Plan     The proposed surgical procedure is considered INTERMEDIATE risk.    Preop examination    Coronary artery disease involving native coronary artery of native heart without angina pectoris  Reason for procedure    Personal history of tobacco use, presenting hazards to health  - CT Chest Lung Cancer Scrn Low Dose wo    History of bilateral oophorectomies/ Menopause  - DX Hip/Pelvis/Spine    Medication Instructions:  Patient is to take all scheduled medications on the day of surgery EXCEPT for modifications listed below:    - aspirin - cardiologist is to call her about hold   - ibuprofen - hold 3 days prior to surgery    RECOMMENDATION:  APPROVAL GIVEN to proceed with proposed procedure, without further diagnostic evaluation.      Subjective     HPI related to upcoming procedure: Amirah has a history of peripheral artery disease - that's why she had a hard time on treadmill.  The symptoms that lead to cardiac testing are Hard time breathing sometimes and fatigue, as well as family history   - Dad had massive heart attack age 45   - Sister had heart attack at 47   - two brothers have had stents placed    Here is her CT angiogram 5/25/22  Dyspnea, unspecified type [R06.00  (ICD-10-CM)]       Interpretation Summary      Scattered plaque in the proximal to mid LAD with mild luminal stenosis. Less than or equal to 25 to 49%. No obstructive stenosis observed. FFR analysis suggests low likelihood of lesion specific ischemia in the proximal, mid and early distal portion of the left anterior descending.    The very distal portion of the left anterior descending is not well visualized. FFR analysis suggests the potential for significant flow limitation limited to the distal left anterior descending. This suggests the possibility of lesion specific ischemia in the distal LAD    Mild nonobstructive disease in the proximal circumflex. Less than or equal to 25 to 49%. FFR analysis suggests low likelihood of lesion specific ischemia in the circumflex or major obtuse marginal branch.    Right coronary artery is dominant. There is plaque in the proximal and mid RCA with mild luminal stenosis. FFR analysis in the right coronary artery demonstrates a low likelihood of lesion specific ischemia in the right coronary artery.    Moderate plaque in the descending thoracic aorta.    Please see separate report for radiology for additional findings.            Preop Questions 6/25/2022   1. Have you ever had a heart attack or stroke? No   2. Have you ever had surgery on your heart or blood vessels, such as a stent placement, a coronary artery bypass, or surgery on an artery in your head, neck, heart, or legs? YES - balloon procedure left leg   3. Do you have chest pain with activity? No   4. Do you have a history of  heart failure? No   5. Do you currently have a cold, bronchitis or symptoms of other infection? No   6. Do you have a cough, shortness of breath, or wheezing? No   7. Do you or anyone in your family have previous history of blood clots? No   8. Do you or does anyone in your family have a serious bleeding problem such as prolonged bleeding following surgeries or cuts? No   9. Have you ever had  problems with anemia or been told to take iron pills? No   10. Have you had any abnormal blood loss such as black, tarry or bloody stools, or abnormal vaginal bleeding? No   11. Have you ever had a blood transfusion? No   12. Are you willing to have a blood transfusion if it is medically needed before, during, or after your surgery? Yes   13. Have you or any of your relatives ever had problems with anesthesia? YES - she would get sick previously with anesthesia  - not in a while (40 years ago)   14. Do you have sleep apnea, excessive snoring or daytime drowsiness? No   15. Do you have any artifical heart valves or other implanted medical devices like a pacemaker, defibrillator, or continuous glucose monitor? No   16. Do you have artificial joints? No   17. Are you allergic to latex? No       Health Care Directive:  Patient does not have a Health Care Directive or Living Will: Discussed advance care planning with patient; information given to patient to review.    Preoperative Review of :   reviewed - no record of controlled substances prescribed.     :[id number]}  Answers for HPI/ROS submitted by the patient on 6/25/2022  If you checked off any problems, how difficult have these problems made it for you to do your work, take care of things at home, or get along with other people?: Not difficult at all  PHQ9 TOTAL SCORE: 3    Lung Cancer Screening Shared Decision Making Visit     Amirah IAN Whitman Cele is eligible for lung cancer screening on the basis of:   has not experienced symptoms suggestive of lung cancer.   born on 1955, 66 year old years old.   smoked 1 packs of cigarettes for 50 years for a total of 50 pack-years   has quit smoking 1 month ago      I have discussed with patient the risks and benefits of screening for lung cancer with low-dose CT.     The risks include:   radiation exposure    false positives     over-diagnosis    The benefit of early detection of lung cancer is contingent upon  adherence to annual screening or more frequent follow up if indicated.     Furthermore, reaping the benefits of screening requires Amirah Oakley to be willing and able to undergo diagnostic procedures, if indicated.     We did discuss that the only way to prevent lung cancer is to not smoke. Smoking cessation assistance was not offered as she has already quit.    Review of Systems   Constitutional: negative for recent illness or change in weight  Eyes: negative for irritation and vision change  Ears, nose, mouth, throat, and face: negative for nasal congestion and sore throat  Gastrointestinal: negative for abdominal pain and change in bowel habits  Genitourinary:negative for dysuria, frequency and hesitancy  Neurological: negative for dizziness, headaches and paresthesia    Patient Active Problem List    Diagnosis Date Noted     Coronary artery disease involving native coronary artery of native heart  Abnormal Nuclear Stress 6/2022    FFR analysis suggests the potential for significant flow limitation limited to the distal le 06/17/2022     Priority: Yrn Fernandez MD  [phone] 5/25/2022 Routine     Narrative & Impression    Scattered plaque in the proximal to mid LAD with mild luminal stenosis. Less than or equal to 25 to 49%. No obstructive stenosis observed. FFR analysis suggests low likelihood of lesion specific ischemia in the proximal, mid and early distal portion of the left anterior descending.    The very distal portion of the left anterior descending is not well visualized. FFR analysis suggests the potential for significant flow limitation limited to the distal left anterior descending. This suggests the possibility of lesion specific ischemia in the distal LAD    Mild nonobstructive disease in the proximal circumflex. Less than or equal to 25 to 49%. FFR analysis suggests low likelihood of lesion specific ischemia in the circumflex or major obtuse marginal branch.    Right  coronary artery is dominant. There is plaque in the proximal and mid RCA with mild luminal stenosis. FFR analysis in the right coronary artery demonstrates a low likelihood of lesion specific ischemia in the right coronary artery.    Moderate plaque in the descending thoracic aorta.    Please see separate report for radiology for additional findings.           Family history of colorectal cancer 04/01/2022     Priority: Medium     Bilateral carotid artery stenosis 11/25/2019     Priority: Medium     Narrative & Impression     EXAM: US CAROTID BILATERAL  LOCATION: [facility]  DATE/TIME: 11/25/2019 12:41 PM  INDICATION: Right carotid bruit  COMPARISON: None.  TECHNIQUE: Duplex exam performed utilizing 2D gray-scale imaging, Doppler   interrogation with color-flow and spectral waveform analysis.  FINDINGS:  RIGHT: There is moderate atheromatous plaque. Normal waveforms with no   significant stenosis.  LEFT: There is moderate atheromatous plaque. Normal waveforms with no   significant stenosis.  Both vertebral arteries and subclavian artery waveforms are normal.  VELOCITY CHART:  The following velocities were obtained in the RIGHT carotid system.  CCA: 89/31 cm/s  ICA: 151/50 cm/s  ECA: 129/19 cm/s  ICA/CCA: PS 1.7  The following velocities were obtained in the LEFT carotid system.  CCA: 102/34 cm/s  ICA: 136/41 cm/s  ECA: 180/37 cm/s  ICA/CCA: PS 1.3  IMPRESSION:   1. RIGHT: 50-69% stenosis of the right ICA..  2. LEFT: Borderline 50-69% stenosis of the left ICA.  Evaluation based on velocities and NASCET criteria.          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     Priority: Medium     Derm Soles         Mixed simple and mucopurulent chronic bronchitis (H)      Priority: Medium     Created by Conversion         Atherosclerosis of native artery of left lower extremity with rest pain (H)  Left Angioplasty SFA 9/17/2019      Priority: Medium     Microscopic hematuria 11/09/2019     Priority: Medium     Cysto  10/2019 neg metro uro         Diverticulosis      Priority: Medium     Created by Conversion  Replacement Utility updated for latest IMO load         Smoking addiction 11/28/2015     Priority: Medium     Fracture of tibia and fibula 11/27/2015     Priority: Medium     Dermatitis      Priority: Medium     Created by Conversion         Joint Pain, Localized In The Shoulder      Priority: Medium     Created by Conversion         Dysplastic Nevus      Priority: Medium     Created by Conversion         Benign Adenomatous Polyp Of The Large Intestine      Priority: Medium     Created by Conversion         Esophageal Reflux      Priority: Medium     Created by Conversion          Past Medical History:   Diagnosis Date     Acute bronchitis with bronchospasm 11/28/2015     Patellar Chondromalacia     Created by Conversion      Tibia/fibula fracture 11/27/2015     Upper Back Pain (Between Shoulder Blades)     Created by Conversion      Past Surgical History:   Procedure Laterality Date     HC OPEN RX WEIGHTBEAR TIB,FIX TIB ONLY Left 11/27/2015    Procedure: OPEN REDUCTION INTERNAL FIXATION LEFT TIBIAL FIBULAR FRACTURE;  Surgeon: Roberto Zepeda MD;  Location: [facility];  Service: Orthopedics     HC REMOVAL GALLBLADDER  1984    Description: Cholecystectomy;  Recorded: 01/08/2010;     HYSTERECTOMY  2002     IR EXTREMITY ANGIOGRAM LEFT  09/17/2019     IR EXTREMITY ANGIOGRAM LEFT  09/17/2019     OOPHORECTOMY  2002     ZZC APPENDECTOMY  1984    Description: Appendectomy;  Recorded: 01/08/2010;     ZZC PART REMOVAL COLON W ANASTOMOSIS      Description: Partial Colectomy;  Recorded: 03/11/2014;     Current Outpatient Medications   Medication Sig Dispense Refill     Ascorbic Acid (VITAMIN C PO) Take 500 mg by mouth daily       aspirin 81 MG EC tablet [ASPIRIN 81 MG EC TABLET] Take 81 mg by mouth daily.       buPROPion (WELLBUTRIN XL) 150 MG 24 hr tablet Take 1 tablet (150 mg) by mouth every morning 90 tablet 1      cholecalciferol, vitamin D3, (D3-50 CHOLECALCIFEROL) 50,000 unit capsule [CHOLECALCIFEROL, VITAMIN D3, (D3-50 CHOLECALCIFEROL) 50,000 UNIT CAPSULE] Take 2,000 Units by mouth daily.              clobetasol (TEMOVATE) 0.05 % external ointment Apply topically 2 times daily To palms until resolved and use sparingly 60 g 1     co-enzyme Q-10 30 mg capsule [CO-ENZYME Q-10 30 MG CAPSULE] Take 200 mg by mouth daily.              ibuprofen (ADVIL,MOTRIN) 200 MG tablet [IBUPROFEN (ADVIL,MOTRIN) 200 MG TABLET] Take 200 mg by mouth every 6 (six) hours as needed for pain.       VITAMIN E PO Take 1 tablet by mouth daily 400 units       rosuvastatin (CRESTOR) 10 MG tablet Take 1 tablet (10 mg) by mouth daily (Patient not taking: Reported on 2022) 90 tablet 3       No Known Allergies     Social History     Tobacco Use     Smoking status: Former Smoker     Packs/day: 1.00     Years: 50.00     Pack years: 50.00     Types: Cigarettes     Quit date: 2022     Years since quittin.1     Smokeless tobacco: Never Used   Substance Use Topics     Alcohol use: Yes     Alcohol/week: 2.0 standard drinks     Family History   Problem Relation Age of Onset     Atrial fibrillation Mother      Thyroid Disease Mother      Alzheimer Disease Mother      Myocardial Infarction Mother      Cancer Father      Myocardial Infarction Father      Heart Disease Sister      Heart Disease Sister      Cancer Sister         Rectal 63      Clotting Disorder Sister      Cancer Brother         started in kidny - metastasis     Heart Disease Brother      Ovarian Cancer Maternal Aunt      Cancer Maternal Aunt         lung     Cancer Maternal Aunt         lung     Hereditary Breast and Ovarian Cancer Syndrome No family hx of      Breast Cancer No family hx of      Endometrial Cancer No family hx of      History   Drug Use No         Objective     BP (!) 140/58 (BP Location: Left arm, Patient Position: Sitting, Cuff Size: Adult Regular)   Pulse 84   Temp 98  " F (36.7  C) (Oral)   Resp 18   Ht 1.638 m (5' 4.5\")   Wt 72.3 kg (159 lb 8 oz)   SpO2 98%   BMI 26.96 kg/m      Physical Exam  General appearance - alert, well appearing, and in no distress  Mental status - normal mood, behavior, speech, dress, motor activity, and thought processes  Eyes - pupils equal and reactive, extraocular eye movements intact, funduscopic exam normal, discs flat and sharp  Ears - bilateral TM's and external ear canals normal  Mouth - mucous membranes moist, pharynx normal without lesions  Neck - supple, no significant adenopathy, carotids upstroke normal bilaterally, no bruits, thyroid exam: thyroid is normal in size without nodules or tenderness  Chest - clear to auscultation, no wheezes, rales or rhonchi, symmetric air entry  Heart - normal rate, regular rhythm, normal S1, S2, no murmurs, rubs, clicks or gallops  Abdomen - soft, nontender, nondistended, no masses or organomegaly  Neurological - alert, oriented, normal speech, no focal findings or movement disorder noted, DTR's normal and symmetric  Extremities - peripheral pulses normal, no pedal edema, no clubbing or cyanosis  Skin - no rashes or worrisome lesions      Recent Labs   Lab Test 05/16/22  1526 04/01/22  1023   HGB 13.7 14.6    287    141   POTASSIUM 5.0 5.1*   CR 0.86 0.81       Revised Cardiac Risk Index (RCRI):  The patient has the following serious cardiovascular risks for perioperative complications:   - Coronary Artery Disease (MI, positive stress test, angina, Qs on EKG) = 1 point     RCRI Interpretation: 0 points: Class I (very low risk - 0.4% complication rate)           Signed Electronically by: Susan Tierney MD  Copy of this evaluation report is provided to requesting physician.      "

## 2022-07-08 ENCOUNTER — PREP FOR PROCEDURE (OUTPATIENT)
Dept: CARDIOLOGY | Facility: CLINIC | Age: 67
End: 2022-07-08

## 2022-07-08 DIAGNOSIS — R94.39 ABNORMAL CARDIOVASCULAR STRESS TEST: Primary | ICD-10-CM

## 2022-07-08 RX ORDER — LIDOCAINE 40 MG/G
CREAM TOPICAL
Status: CANCELLED | OUTPATIENT
Start: 2022-07-08

## 2022-07-08 RX ORDER — DIAZEPAM 5 MG
10 TABLET ORAL
Status: CANCELLED | OUTPATIENT
Start: 2022-07-08

## 2022-07-08 RX ORDER — ASPIRIN 325 MG
325 TABLET ORAL ONCE
Status: CANCELLED | OUTPATIENT
Start: 2022-07-08 | End: 2022-07-08

## 2022-07-08 RX ORDER — ASPIRIN 81 MG/1
243 TABLET, CHEWABLE ORAL ONCE
Status: CANCELLED | OUTPATIENT
Start: 2022-07-08

## 2022-07-08 RX ORDER — SODIUM CHLORIDE 9 MG/ML
INJECTION, SOLUTION INTRAVENOUS CONTINUOUS
Status: CANCELLED | OUTPATIENT
Start: 2022-07-08

## 2022-07-08 RX ORDER — FENTANYL CITRATE 50 UG/ML
25 INJECTION, SOLUTION INTRAMUSCULAR; INTRAVENOUS
Status: CANCELLED | OUTPATIENT
Start: 2022-07-08

## 2022-07-08 NOTE — TELEPHONE ENCOUNTER
Amirah Oakley  [address] [address]  [phone] (home)     PCP:  Hector Luu  H&P completed by:  PMBRIJESH 6/28  Admit date 7/11 Arrival time:  0630  Anticoagulation:  NA  Previous PCI: No  Bypass Grafts: No  Renal Issues: No  Diabetic?: No  Device?: No    Ambulation status: ambulatory     Reason for Visit:  Telephone call to discuss pre-procedure education in preparation for: Coronary Angiogram with Possible PCI    Procedure Prep:  EKG results obtained, dated: To be completed on day of cath lab procedure  Hemogram results obtained: To be completed on day of cath lab procedure  Basic Metabolic Panel results obtained: To be completed on day of cath lab procedure  Pertinent cardiac test results: abnormal stress test 6/15 + abnormal ccta in May   COVID-19 test results obtained: home test     Patient Education    1. Your arrival time is 0630.  [place] [address] - Main Entrance of the Hospital  2. Please plan on being at the hospital all day.  3. At any time, emergencies and/or urgent cases may come up which could delay the start of your procedure.    COVID Testing Instructions  *Mandatory COVID Testing:   ALL Patients will need to complete a COVID test no sooner than 4 days prior to their procedure (regardless of vaccination status).      To schedule COVID testing Please call [phone]    If you want to complete this at an outside facility please call them to find out if they will have the results within the appropriate time frame and their fax number.  You will need to provide us with that information so we can send the order.    The facility completing the test will need to fax the results to [phone]    If you are running into and issues please call us.     Pre-procedure instructions  Take your temperature in the morning prior to coming in.  If your temperature is 100 F please call  [facility] [phone] and  notify them.  If you do not have access to a thermometer at home, please come in for testing.  If you are running a temperature your procedure may be rescheduled.  Patient instructed to not Eat or Drink after midnight.  Patient instructed to shower the evening before or the morning of the procedure.  Patient instructed to arrange for transportation home following procedure from a responsible family member or friend. No driving for at least 24 hours.  Patient instructed to have a responsible adult with them for 24 hours post-procedure.  Post-procedure follow up process.  Conscious sedation discussed.      Pre-procedure medication instructions.  Continue medications as scheduled, with a small amount of water on the day of the procedure unless indicated. (NO Diabetic Medications or Blood Thinners)  Pt instructed not to consume Alcohol, Tobacco, Caffeine, or Carbonated beverages 12 hours prior to procedure.  Patient instructed to take 324 mg of Aspirin the night before and morning of procedure: Yes  Other medication: instructed to only take aspirin the  a.m. of the procedure.    ?                        Patient states understanding of procedure and agrees to proceed.    *PATIENTS RECORDS AVAILABLE IN [facility] UNLESS OTHERWISE INDICATED*      Patient Active Problem List   Diagnosis     Dysplastic Nevus     Diverticulosis     Benign Adenomatous Polyp Of The Large Intestine     Mixed simple and mucopurulent chronic bronchitis (H)     Esophageal Reflux     Dermatitis     Joint Pain, Localized In The Shoulder     Fracture of tibia and fibula     Smoking addiction     Atherosclerosis of native artery of left lower extremity with rest pain (H)  Left Angioplasty SFA 9/17/2019     Microscopic hematuria     Localized pustular psoriasis     Bilateral carotid artery stenosis     Family history of colorectal cancer     Coronary artery disease involving native coronary artery of native heart  Abnormal Nuclear Stress 6/2022    FFR  analysis suggests the potential for significant flow limitation limited to the distal le       Current Outpatient Medications   Medication Sig Dispense Refill     Ascorbic Acid (VITAMIN C PO) Take 500 mg by mouth daily       aspirin 81 MG EC tablet [ASPIRIN 81 MG EC TABLET] Take 81 mg by mouth daily.       buPROPion (WELLBUTRIN XL) 150 MG 24 hr tablet Take 1 tablet (150 mg) by mouth every morning 90 tablet 1     cholecalciferol, vitamin D3, (D3-50 CHOLECALCIFEROL) 50,000 unit capsule [CHOLECALCIFEROL, VITAMIN D3, (D3-50 CHOLECALCIFEROL) 50,000 UNIT CAPSULE] Take 2,000 Units by mouth daily.              clobetasol (TEMOVATE) 0.05 % external ointment Apply topically 2 times daily To palms until resolved and use sparingly 60 g 1     co-enzyme Q-10 30 mg capsule [CO-ENZYME Q-10 30 MG CAPSULE] Take 200 mg by mouth daily.              ibuprofen (ADVIL,MOTRIN) 200 MG tablet [IBUPROFEN (ADVIL,MOTRIN) 200 MG TABLET] Take 200 mg by mouth every 6 (six) hours as needed for pain.       rosuvastatin (CRESTOR) 10 MG tablet Take 1 tablet (10 mg) by mouth daily (Patient not taking: Reported on 6/28/2022) 90 tablet 3     VITAMIN E PO Take 1 tablet by mouth daily 400 units         No Known Allergies    Candi Gillespie RN on 7/8/2022 at 12:23 PM      Her  will be her .

## 2022-07-11 ENCOUNTER — HOSPITAL ENCOUNTER (OUTPATIENT)
Facility: HOSPITAL | Age: 67
Discharge: HOME | End: 2022-07-11
Attending: INTERNAL MEDICINE | Admitting: INTERNAL MEDICINE
Payer: COMMERCIAL

## 2022-07-11 VITALS
OXYGEN SATURATION: 94 % | SYSTOLIC BLOOD PRESSURE: 145 MMHG | HEIGHT: 64 IN | HEART RATE: 70 BPM | RESPIRATION RATE: 22 BRPM | WEIGHT: 158.6 LBS | TEMPERATURE: 98.3 F | DIASTOLIC BLOOD PRESSURE: 65 MMHG | BODY MASS INDEX: 27.08 KG/M2

## 2022-07-11 DIAGNOSIS — R94.39 ABNORMAL CARDIOVASCULAR STRESS TEST: ICD-10-CM

## 2022-07-11 LAB
ABO/RH(D): NORMAL
ANION GAP SERPL CALCULATED.3IONS-SCNC: 4 MMOL/L (ref 5–18)
ANTIBODY SCREEN: NEGATIVE
ATRIAL RATE - MUSE: 64 BPM
BUN SERPL-MCNC: 18 MG/DL (ref 8–22)
CALCIUM SERPL-MCNC: 9.8 MG/DL (ref 8.5–10.5)
CHLORIDE BLD-SCNC: 105 MMOL/L (ref 98–107)
CHOLEST SERPL-MCNC: 144 MG/DL
CO2 SERPL-SCNC: 29 MMOL/L (ref 22–31)
CREAT SERPL-MCNC: 0.86 MG/DL (ref 0.6–1.1)
DIASTOLIC BLOOD PRESSURE - MUSE: NORMAL MMHG
ERYTHROCYTE [DISTWIDTH] IN BLOOD BY AUTOMATED COUNT: 12.7 % (ref 10–15)
FASTING STATUS PATIENT QL REPORTED: YES
GFR SERPL CREATININE-BSD FRML MDRD: 75 ML/MIN/1.73M2
GLUCOSE BLD-MCNC: 107 MG/DL (ref 70–125)
HCG SERPL-ACNC: 2 MLU/ML (ref 0–4)
HCT VFR BLD AUTO: 40.3 % (ref 35–47)
HDLC SERPL-MCNC: 51 MG/DL
HGB BLD-MCNC: 13.1 G/DL (ref 11.7–15.7)
INTERPRETATION ECG - MUSE: NORMAL
LDLC SERPL CALC-MCNC: 73 MG/DL
MCH RBC QN AUTO: 30.8 PG (ref 26.5–33)
MCHC RBC AUTO-ENTMCNC: 32.5 G/DL (ref 31.5–36.5)
MCV RBC AUTO: 95 FL (ref 78–100)
P AXIS - MUSE: 42 DEGREES
PLATELET # BLD AUTO: 249 10E3/UL (ref 150–450)
POTASSIUM BLD-SCNC: 4.2 MMOL/L (ref 3.5–5)
PR INTERVAL - MUSE: 166 MS
QRS DURATION - MUSE: 86 MS
QT - MUSE: 440 MS
QTC - MUSE: 453 MS
R AXIS - MUSE: 60 DEGREES
RBC # BLD AUTO: 4.26 10E6/UL (ref 3.8–5.2)
SODIUM SERPL-SCNC: 138 MMOL/L (ref 136–145)
SPECIMEN EXPIRATION DATE: NORMAL
SYSTOLIC BLOOD PRESSURE - MUSE: NORMAL MMHG
T AXIS - MUSE: 69 DEGREES
TRIGL SERPL-MCNC: 99 MG/DL
VENTRICULAR RATE- MUSE: 64 BPM
WBC # BLD AUTO: 7.8 10E3/UL (ref 4–11)

## 2022-07-11 PROCEDURE — 99152 MOD SED SAME PHYS/QHP 5/>YRS: CPT | Performed by: INTERNAL MEDICINE

## 2022-07-11 PROCEDURE — 258N000003 HC RX IP 258 OP 636: Performed by: INTERNAL MEDICINE

## 2022-07-11 PROCEDURE — 93005 ELECTROCARDIOGRAM TRACING: CPT

## 2022-07-11 PROCEDURE — 250N000011 HC RX IP 250 OP 636: Performed by: INTERNAL MEDICINE

## 2022-07-11 PROCEDURE — 250N000013 HC RX MED GY IP 250 OP 250 PS 637: Performed by: INTERNAL MEDICINE

## 2022-07-11 PROCEDURE — C1894 INTRO/SHEATH, NON-LASER: HCPCS | Performed by: INTERNAL MEDICINE

## 2022-07-11 PROCEDURE — 93458 L HRT ARTERY/VENTRICLE ANGIO: CPT | Performed by: INTERNAL MEDICINE

## 2022-07-11 PROCEDURE — 999N000054 HC STATISTIC EKG NON-CHARGEABLE

## 2022-07-11 PROCEDURE — 86901 BLOOD TYPING SEROLOGIC RH(D): CPT | Performed by: INTERNAL MEDICINE

## 2022-07-11 PROCEDURE — 80061 LIPID PANEL: CPT | Performed by: INTERNAL MEDICINE

## 2022-07-11 PROCEDURE — C1887 CATHETER, GUIDING: HCPCS | Performed by: INTERNAL MEDICINE

## 2022-07-11 PROCEDURE — 84702 CHORIONIC GONADOTROPIN TEST: CPT | Performed by: INTERNAL MEDICINE

## 2022-07-11 PROCEDURE — 250N000009 HC RX 250: Performed by: INTERNAL MEDICINE

## 2022-07-11 PROCEDURE — 80048 BASIC METABOLIC PNL TOTAL CA: CPT | Performed by: INTERNAL MEDICINE

## 2022-07-11 PROCEDURE — 272N000001 HC OR GENERAL SUPPLY STERILE: Performed by: INTERNAL MEDICINE

## 2022-07-11 PROCEDURE — 36415 COLL VENOUS BLD VENIPUNCTURE: CPT | Performed by: INTERNAL MEDICINE

## 2022-07-11 PROCEDURE — 85027 COMPLETE CBC AUTOMATED: CPT | Performed by: INTERNAL MEDICINE

## 2022-07-11 PROCEDURE — 93010 ELECTROCARDIOGRAM REPORT: CPT | Performed by: GENERAL ACUTE CARE HOSPITAL

## 2022-07-11 PROCEDURE — 93458 L HRT ARTERY/VENTRICLE ANGIO: CPT | Mod: 26 | Performed by: INTERNAL MEDICINE

## 2022-07-11 RX ORDER — SODIUM CHLORIDE 9 MG/ML
INJECTION, SOLUTION INTRAVENOUS CONTINUOUS
Status: DISCONTINUED | OUTPATIENT
Start: 2022-07-11 | End: 2022-07-11 | Stop reason: HOSPADM

## 2022-07-11 RX ORDER — NALOXONE HYDROCHLORIDE 0.4 MG/ML
0.4 INJECTION, SOLUTION INTRAMUSCULAR; INTRAVENOUS; SUBCUTANEOUS
Status: DISCONTINUED | OUTPATIENT
Start: 2022-07-11 | End: 2022-07-11 | Stop reason: HOSPADM

## 2022-07-11 RX ORDER — ATROPINE SULFATE 0.1 MG/ML
0.5 INJECTION INTRAVENOUS
Status: DISCONTINUED | OUTPATIENT
Start: 2022-07-11 | End: 2022-07-11 | Stop reason: HOSPADM

## 2022-07-11 RX ORDER — LIDOCAINE 40 MG/G
CREAM TOPICAL
Status: DISCONTINUED | OUTPATIENT
Start: 2022-07-11 | End: 2022-07-11 | Stop reason: HOSPADM

## 2022-07-11 RX ORDER — IOPAMIDOL 755 MG/ML
INJECTION, SOLUTION INTRAVASCULAR
Status: DISCONTINUED | OUTPATIENT
Start: 2022-07-11 | End: 2022-07-11 | Stop reason: HOSPADM

## 2022-07-11 RX ORDER — NALOXONE HYDROCHLORIDE 0.4 MG/ML
0.2 INJECTION, SOLUTION INTRAMUSCULAR; INTRAVENOUS; SUBCUTANEOUS
Status: DISCONTINUED | OUTPATIENT
Start: 2022-07-11 | End: 2022-07-11 | Stop reason: HOSPADM

## 2022-07-11 RX ORDER — ACETAMINOPHEN 325 MG/1
650 TABLET ORAL EVERY 4 HOURS PRN
Status: DISCONTINUED | OUTPATIENT
Start: 2022-07-11 | End: 2022-07-11 | Stop reason: HOSPADM

## 2022-07-11 RX ORDER — FENTANYL CITRATE 50 UG/ML
INJECTION, SOLUTION INTRAMUSCULAR; INTRAVENOUS
Status: DISCONTINUED | OUTPATIENT
Start: 2022-07-11 | End: 2022-07-11 | Stop reason: HOSPADM

## 2022-07-11 RX ORDER — OXYCODONE HYDROCHLORIDE 5 MG/1
5 TABLET ORAL EVERY 4 HOURS PRN
Status: DISCONTINUED | OUTPATIENT
Start: 2022-07-11 | End: 2022-07-11 | Stop reason: HOSPADM

## 2022-07-11 RX ORDER — FLUMAZENIL 0.1 MG/ML
0.2 INJECTION, SOLUTION INTRAVENOUS
Status: DISCONTINUED | OUTPATIENT
Start: 2022-07-11 | End: 2022-07-11 | Stop reason: HOSPADM

## 2022-07-11 RX ORDER — FENTANYL CITRATE 50 UG/ML
25 INJECTION, SOLUTION INTRAMUSCULAR; INTRAVENOUS
Status: DISCONTINUED | OUTPATIENT
Start: 2022-07-11 | End: 2022-07-11 | Stop reason: HOSPADM

## 2022-07-11 RX ORDER — DIAZEPAM 10 MG
10 TABLET ORAL
Status: COMPLETED | OUTPATIENT
Start: 2022-07-11 | End: 2022-07-11

## 2022-07-11 RX ORDER — OXYCODONE HYDROCHLORIDE 5 MG/1
10 TABLET ORAL EVERY 4 HOURS PRN
Status: DISCONTINUED | OUTPATIENT
Start: 2022-07-11 | End: 2022-07-11 | Stop reason: HOSPADM

## 2022-07-11 RX ORDER — ASPIRIN 325 MG
325 TABLET ORAL ONCE
Status: DISCONTINUED | OUTPATIENT
Start: 2022-07-11 | End: 2022-07-11 | Stop reason: HOSPADM

## 2022-07-11 RX ORDER — ASPIRIN 81 MG/1
243 TABLET, CHEWABLE ORAL ONCE
Status: DISCONTINUED | OUTPATIENT
Start: 2022-07-11 | End: 2022-07-11 | Stop reason: HOSPADM

## 2022-07-11 RX ADMIN — SODIUM CHLORIDE: 9 INJECTION, SOLUTION INTRAVENOUS at 07:11

## 2022-07-11 RX ADMIN — DIAZEPAM 10 MG: 10 TABLET ORAL at 08:35

## 2022-07-11 ASSESSMENT — EJECTION FRACTION: EF_VALUE: .23

## 2022-07-11 NOTE — PLAN OF CARE
Goal Outcome Evaluation:      Pt admitted for CA/P.PCI due to dyspnea and abnormal stress test. Pt has a hx PAD, her left pedal pulse is weaker then the right.  Pt prepped and ready for procedure.      Libia Sarmiento RN

## 2022-07-11 NOTE — INTERVAL H&P NOTE
"I have reviewed the surgical (or preoperative) H&P that is linked to this encounter, and examined the patient. There are no significant changes    Clinical Conditions Present on Arrival:  Clinically Significant Risk Factors Present on Admission                   # Overweight: Estimated body mass index is 26.96 kg/m  as calculated from the following:    Height as of 6/28/22: 1.638 m (5' 4.5\").    Weight as of 6/28/22: 72.3 kg (159 lb 8 oz).       "

## 2022-07-11 NOTE — PRE-PROCEDURE
GENERAL PRE-PROCEDURE:   Procedure:  Coronary angiogram with possible PCI  Date/Time:  7/11/2022 6:47 AM    Written consent obtained?: Yes    Risks and benefits: Risks, benefits and alternatives were discussed    Consent given by:  Patient  Patient states understanding of procedure being performed: Yes    Patient's understanding of procedure matches consent: Yes    Procedure consent matches procedure scheduled: Yes    Expected level of sedation:  Moderate  Appropriately NPO:  Yes  ASA Class:  3 (abnormal stress, CAD on CTA, carotid artery stenosis, former smoker, PAD, family hx of premature CAD)  Mallampati  :  Grade 3- soft palate visible, posterior pharyngeal wall not visible  Lungs:  Lungs clear with good breath sounds bilaterally  Heart:  Normal heart sounds and rate  History & Physical reviewed:  History and physical reviewed and no updates needed  Statement of review:  I have reviewed the lab findings, diagnostic data, medications, and the plan for sedation

## 2022-07-13 ENCOUNTER — HOSPITAL ENCOUNTER (OUTPATIENT)
Dept: BONE DENSITY | Facility: HOSPITAL | Age: 67
Discharge: HOME | End: 2022-07-13
Attending: FAMILY MEDICINE
Payer: COMMERCIAL

## 2022-07-13 ENCOUNTER — HOSPITAL ENCOUNTER (OUTPATIENT)
Dept: CT IMAGING | Facility: HOSPITAL | Age: 67
Discharge: HOME | End: 2022-07-13
Attending: FAMILY MEDICINE
Payer: COMMERCIAL

## 2022-07-13 DIAGNOSIS — Z78.0 MENOPAUSE: ICD-10-CM

## 2022-07-13 DIAGNOSIS — Z87.891 PERSONAL HISTORY OF TOBACCO USE, PRESENTING HAZARDS TO HEALTH: ICD-10-CM

## 2022-07-13 DIAGNOSIS — Z90.722 HISTORY OF BILATERAL OOPHORECTOMIES: ICD-10-CM

## 2022-07-13 PROCEDURE — 71271 CT THORAX LUNG CANCER SCR C-: CPT

## 2022-07-13 PROCEDURE — 77080 DXA BONE DENSITY AXIAL: CPT

## 2022-07-14 ENCOUNTER — TELEPHONE (OUTPATIENT)
Dept: FAMILY MEDICINE | Facility: CLINIC | Age: 67
End: 2022-07-14

## 2022-07-14 NOTE — TELEPHONE ENCOUNTER
buPROPion (WELLBUTRIN XL) 150 MG 24 hr tablet.  Patient is not taking as prescribed.  The pharmacy has reviewed the patient's retail and mail service rx history indicates  That they may have stopped suing their medication.  Please contact patient

## 2022-07-15 NOTE — TELEPHONE ENCOUNTER
Please reach out to Amirah    How is she taking?    Does she need refill?    Direct refill to another provider    Rohith

## 2022-07-18 ENCOUNTER — MYC MEDICAL ADVICE (OUTPATIENT)
Dept: INTERNAL MEDICINE | Facility: CLINIC | Age: 67
End: 2022-07-18

## 2022-07-18 DIAGNOSIS — F43.20 ADJUSTMENT DISORDER, UNSPECIFIED TYPE: ICD-10-CM

## 2022-07-18 RX ORDER — BUPROPION HYDROCHLORIDE 150 MG/1
150 TABLET ORAL EVERY MORNING
Qty: 90 TABLET | Refills: 1 | Status: SHIPPED | OUTPATIENT
Start: 2022-07-18 | End: 2023-01-11

## 2022-07-20 ENCOUNTER — ANCILLARY PROCEDURE (OUTPATIENT)
Dept: VASCULAR ULTRASOUND | Facility: CLINIC | Age: 67
End: 2022-07-20
Attending: INTERNAL MEDICINE
Payer: COMMERCIAL

## 2022-07-20 DIAGNOSIS — I73.9 PERIPHERAL VASCULAR DISEASE (H): ICD-10-CM

## 2022-07-20 DIAGNOSIS — I25.119 CORONARY ARTERY DISEASE INVOLVING NATIVE CORONARY ARTERY OF NATIVE HEART WITH ANGINA PECTORIS (H): ICD-10-CM

## 2022-07-20 PROCEDURE — 93924 LWR XTR VASC STDY BILAT: CPT

## 2022-07-20 PROCEDURE — 93926 LOWER EXTREMITY STUDY: CPT | Mod: LT

## 2022-08-04 ENCOUNTER — VIRTUAL VISIT (OUTPATIENT)
Dept: VASCULAR SURGERY | Facility: CLINIC | Age: 67
End: 2022-08-04
Attending: SURGERY
Payer: COMMERCIAL

## 2022-08-04 DIAGNOSIS — I70.222 ATHEROSCLEROSIS OF NATIVE ARTERY OF LEFT LOWER EXTREMITY WITH REST PAIN (H): Primary | ICD-10-CM

## 2022-08-04 PROCEDURE — 99214 OFFICE O/P EST MOD 30 MIN: CPT | Mod: GT | Performed by: SURGERY

## 2022-08-04 NOTE — PROGRESS NOTES
VASCULAR SURGERY OUTPATIENT VIRTUAL CONSULT OR VISIT   VASCULAR SURGEON: Yuki Ponce MD    LOCATION:  Virtual visit done using video    Amirah Oakley   Medical Record #:  [id number]  YOB: 1955  Age:  66 year old     Date of Service: 8/4/2022    PRIMARY CARE PROVIDER: Hector Luu      Reason for consultation: Really evaluation of PAD symptom    IMPRESSION: Patient with recurrence of claudication in the last 6 months and drop in ABIs as well as evidence of restenosis of her SFA.  All of this is consistent with recurrent stenosis of the SFA lesion previously treated by me for extremely short distance claudication.  Currently not as bad as when I first met her and no longer working full-time so not the same impetus to treat her claudication.  Has managed to quit smoking again now for about 2 months.    RECOMMENDATION: We will schedule for supervised exercise regimen.  We discussed that this is likely more durable and that really intervention for SFA disease has fairly poor patency.  Plan on seeing her back in about 4 months time with repeat Alcaraz Fong test to see how she has done.  If she still having significant symptoms we could look to doing a repeat intervention at this time place a drug-eluting stent    HPI:  Amirah Oakley is a 66 year old female who was evaluated today for recurrence of PAD.  This is a patient who had had extremely short distance claudication and was working at [employer] and unable to perform her duties.  We performed a simple balloon angioplasty of her SFA stenosis in September 2019 with excellent technical outcome.  She had complete resolution of her claudication and normalization of her ABIs when I last saw her.  It also quit smoking.  Since then, with COVID, she had gone back to smoking and had not been very active.  She missed her last appointment with us.  She has since backed off on her work being partially retired and so does not have the  same obligations.    It appears that she had developed some coronary disease this year and required coronary stenting.  Her cardiologist had then recommended she come back to see me given the recurrence of her claudication symptoms.    She does not have any rest pain or tissue loss.  She has managed to quit smoking once again and is now almost 2 months without smoking.  This was after her coronary stents.  Was given Wellbutrin and nicotine inhaler to help her quit in this it appears to have been successful.    No other new health issues.    PHH:    Past Medical History:   Diagnosis Date     Acute bronchitis with bronchospasm 11/28/2015     Patellar Chondromalacia     Created by Conversion      Tibia/fibula fracture 11/27/2015     Upper Back Pain (Between Shoulder Blades)     Created by Conversion         Past Surgical History:   Procedure Laterality Date     CV CORONARY ANGIOGRAM N/A 7/11/2022    Procedure: Coronary Angiogram;  Surgeon: Britney Barriga MD;  Location: [facility] CATH [facility] CV     CV LEFT HEART CATH N/A 7/11/2022    Procedure: Left Heart Catheterization;  Surgeon: Britney Barriga MD;  Location: [facility] CV     HC OPEN RX WEIGHTBEAR TIB,FIX TIB ONLY Left 11/27/2015    Procedure: OPEN REDUCTION INTERNAL FIXATION LEFT TIBIAL FIBULAR FRACTURE;  Surgeon: Roberto Zepeda MD;  Location: [facility];  Service: Orthopedics     HC REMOVAL GALLBLADDER  1984    Description: Cholecystectomy;  Recorded: 01/08/2010;     HYSTERECTOMY  2002     IR EXTREMITY ANGIOGRAM LEFT  09/17/2019     IR EXTREMITY ANGIOGRAM LEFT  09/17/2019     OOPHORECTOMY  2002     ZZC APPENDECTOMY  1984    Description: Appendectomy;  Recorded: 01/08/2010;     ZZC PART REMOVAL COLON W ANASTOMOSIS      Description: Partial Colectomy;  Recorded: 03/11/2014;       ALLERGIES:  Patient has no known allergies.    MEDS:    Current Outpatient Medications:      Ascorbic Acid (VITAMIN C PO), Take 500 mg by mouth daily, Disp: , Rfl:       aspirin 81 MG EC tablet, [ASPIRIN 81 MG EC TABLET] Take 81 mg by mouth daily., Disp: , Rfl:      buPROPion (WELLBUTRIN XL) 150 MG 24 hr tablet, Take 1 tablet (150 mg) by mouth every morning, Disp: 90 tablet, Rfl: 1     calcium carbonate (CALCIUM 600) 1500 (600 Ca) MG tablet, Take 1,200 mg by mouth daily, Disp: , Rfl:      cholecalciferol, vitamin D3, (D3-50 CHOLECALCIFEROL) 50,000 unit capsule, [CHOLECALCIFEROL, VITAMIN D3, (D3-50 CHOLECALCIFEROL) 50,000 UNIT CAPSULE] Take 2,000 Units by mouth daily.       , Disp: , Rfl:      clobetasol (TEMOVATE) 0.05 % external ointment, Apply topically 2 times daily To palms until resolved and use sparingly, Disp: 60 g, Rfl: 1     co-enzyme Q-10 30 mg capsule, [CO-ENZYME Q-10 30 MG CAPSULE] Take 200 mg by mouth daily.       , Disp: , Rfl:      ibuprofen (ADVIL,MOTRIN) 200 MG tablet, [IBUPROFEN (ADVIL,MOTRIN) 200 MG TABLET] Take 200 mg by mouth every 6 (six) hours as needed for pain., Disp: , Rfl:      rosuvastatin (CRESTOR) 10 MG tablet, Take 1 tablet (10 mg) by mouth daily, Disp: 90 tablet, Rfl: 3     VITAMIN E PO, Take 1 tablet by mouth daily 400 units, Disp: , Rfl:     SOCIAL HABITS:    History   Smoking Status     Former Smoker     Packs/day: 1.00     Years: 50.00     Types: Cigarettes     Quit date: 5/17/2022   Smokeless Tobacco     Never Used     Social History    Substance and Sexual Activity      Alcohol use: Yes        Alcohol/week: 2.0 standard drinks      History   Drug Use No       FAMILY HISTORY:    Family History   Problem Relation Age of Onset     Atrial fibrillation Mother      Thyroid Disease Mother      Alzheimer Disease Mother      Myocardial Infarction Mother      Cancer Father      Myocardial Infarction Father      Heart Disease Sister      Heart Disease Sister      Cancer Sister         Rectal 63      Clotting Disorder Sister      Cancer Brother         started in kidny - metastasis     Heart Disease Brother      Ovarian Cancer Maternal Aunt      Cancer  Maternal Aunt         lung     Cancer Maternal Aunt         lung     Hereditary Breast and Ovarian Cancer Syndrome No family hx of      Breast Cancer No family hx of      Endometrial Cancer No family hx of        REVIEW OF SYSTEMS:    A 12 point ROS was reviewed and except for what is listed in the HPI above, all others are negative    PE:  There were no vitals taken for this visit.  Wt Readings from Last 1 Encounters:   07/11/22 71.9 kg (158 lb 9.6 oz)     There is no height or weight on file to calculate BMI.    EXAM:  EXAM LIMITED AS THIS IS A VIRTUAL VISIT with video  GENERAL: This is a well-developed 66 year old female who appears her stated age  EYES: Grossly normal.  MOUTH: Buccal mucosa normal   MUSCULOSKELETAL: Grossly normal and both lower extremities are intact.  HEME/LYMPH: No lymphedema  NEUROLOGIC: Focally intact, Alert and oriented x 3.   PSYCH: appropriate affect  INTEGUMENT: No open lesions or ulcers            DIAGNOSTIC STUDIES:     Images:  Cardiac Catheterization    Result Date: 7/11/2022  66-year-old female with recent complaints of dyspnea on exertion, and abnormal CTA as well as an abnormal Cardiolite stress test suggestive of apical ischemia, referred for coronary angiography. Coronary angiography showed: Left main with mild disease LAD has mild diffuse atherosclerosis with no obstructive lesions identified.  It does taper down to relatively small vessel towards the apex with perhaps 30 to 40% stenosis in that segment Left circumflex is nondominant giving 1 large obtuse marginal with mild disease RCA is dominant with mild diffuse atherosclerosis LVEDP 17 mmHg No clear obstructive coronary artery disease identified.  Symptoms may be due to diastolic dysfunction secondary to her hypertension, as she had systolic pressures in the 170-180 mmHg range during the procedure.  The elevated LVEDP would also speak to underlying diastolic dysfunction.    CT Chest Lung Cancer Scrn Low Dose wo    Result  Date: 7/14/2022  EXAM: LOW DOSE LUNG CANCER SCREENING CT CHEST LOCATION: [facility] DATE/TIME: 7/13/2022 4:48 PM INDICATION: Lung cancer screening. History of smoking. High risk patient. COMPARISON: None. TECHNIQUE: Low-dose lung cancer screening non-contrast CT chest. Dose reduction techniques were used. FINDINGS: NODULES: 1-2 mm peripheral right upper lobe nodule (series 4, image 66). LUNGS AND PLEURA: Minimal emphysema. Lungs are clear. Mild airway thickening. MEDIASTINUM: No adenopathy. Normal heart size. Nonaneurysmal aorta. CORONARY ARTERY CALCIFICATION: Mild. LIMITED UPPER ABDOMEN: Cholecystectomy. MUSCULOSKELETAL: Bony demineralization.     IMPRESSION: 1.  Negative for lung cancer screening purposes. LungRADS CATEGORY: 2: Benign. (<1% risk of malignancy) -Perifissural nodule(s): <10 mm -Solid nodule(s): <6 mm on baseline screening; or new nodule <4 mm -Subsolid nodule(s): <6 mm on baseline screening -Ground glass nodule(s): <30 mm; or greater than or equal to 30 mm and unchanged or slowly growing -Category 3 or 4 nodules that are unchanged for greater than or equal to 3 months RADIOLOGIST RECOMMENDATION: Continue annual screening with low-dose CT chest in 12 months.    DX Hip/Pelvis/Spine    Result Date: 7/13/2022  EXAM: DX HIP/PELVIS/SPINE LOCATION: [facility] DATE/TIME: 7/13/2022 10:36 AM INDICATION:  History of bilateral oophorectomies, Menopause COMPARISON: None. TECHNIQUE: Dual-energy x-ray absorptiometry performed with routine technique. FINDINGS: Lumbar Spine: L1-L4: BMD: 0.974 g/cm2. T-score: -1.7. Z-score: -0.2 RIGHT Hip Total: BMD: 0.890 g/cm2. T-score: -0.9. Z-score: 0.2 RIGHT Hip Femoral neck: BMD: 0.866 g/cm2. T-score: -1.2. Z-score: 0.2 LEFT Hip Total: BMD: 0.897 g/cm2. T-score: -0.9. Z-score: 0.3 LEFT Hip Femoral neck: BMD: 0.869 g/cm2. T-score: -1.2. Z-score: 0.2 WHO Criteria: Normal: T score at or above -1 SD Osteopenia: T score between -1  and -2.5 SD Osteoporosis: T score at or below -2.5 SD COMPARISON: None FRAX Results: Major osteoporotic fracture: 26.0% Hip fracture: 1.2%     IMPRESSION: Low bone density (OSTEOPENIA). T score meets the World Health Organization (WHO) criteria for low bone density (osteopenia) at one or more measured sites. The risk of osteoporotic fracture increased approximately two-fold for each SD decrease  in T-score.    US Lower Extremity Arterial Duplex Left    Result Date: 7/20/2022  Arterial Duplex Ultrasound (Date: 07/20/22) Lower Extremity Artery Evaluation Indication: SURVEILLANCE LEFT LEG: PAD, LEFT LEG PAIN/CLAUDICATION. HX: IR LEFT SUPERFICIAL FEMORAL ARTERY DISTAL BALLOON ANGIOGRAM DONE 9/17/2019. Previous: 10/1/2019 History: Previous Smoker, Hypertension, Hyperlipidemia, PAD, CAD, Angioplasty and Claudication Technique: Duplex imaging is performed utilizing gray-scale, two-dimensional images, and color-flow imaging. Doppler waveform analysis and spectral Doppler imaging is also performed. LOWER EXTREMITY ARTERIAL DUPLEX EXAM WITH WAVEFORMS Right Leg:(cm/s) Location: Velocities Waveforms PTA:   80   B MAGALI:   108  T DPA:   121  T Waveforms: T=Triphasic, M=Monophasic, B=Biphasic Left Leg:(cm/s) Location: Velocities Waveforms EIA:   188  B CFA:   125  B PFA:   126  B SFA Proximal:   101  B SFA Mid:   102 / 273 / 167 B SFA Distal:   125 /  202 B Popliteal Artery:   47 / 67 M PTA:   95   M MAGALI:   38  M DPA:   39  M Waveforms: T=Triphasic, M=Monophasic, B=Biphasic Stenotic Profile Ratio: 273 / 102 = 2.68, 202/ 125= 1.61  Impression: Right Lower Extremity: Patent posterior tibial, anterior tibial and dorsalis pedis arteries with multiphasic flow. Left Lower Extremity: Patent vasculature to the distal superficial femoral artery with biphasic flow.  Elevation in mid superficial femoral artery peak systolic velocities, suggesting 50 to 99% stenosis.  Transition to monophasic flow in the popliteal artery.  Patent  infrapopliteal vessels. Reference: Category Normal 1-19% 20-49% 50-99% Occluded PSV <160 cm/sec without spectral broadening <160 cm/sec with spectral broadening Increased Increased Absent Flow Ratio N/A N/A < 2.0 >2.0 N/A Post-Stenotic Turbulence No No No Yes N/A     US L Ext Arterial Doppler Seg Pres w Exer Percy    Result Date: 7/20/2022  BILATERAL Ankle Brachial Index with Exercise (Date: 07/20/22) Indication: SURVEILLANCE NANCY'S: PAD,, LEFT LEG PAIN/CLAUDICATION. HX: IR LEFT SUPERFICIAL FEMORAL ARTERY DISTAL BALLOON ANGIOGRAM DONE 9/17/2019. Previous: 10/1/2019 History: Previous Smoker, Hypertension, Hyperlipidemia, PAD, CAD, Angioplasty and Claudication Resting NANCY's          Right: mmHg Index     Brachial: 195  Ankle-(PT): 203 1.04 Ankle-(DP): 204 1.05          Digit  135 0.69               Left: mmHg Index     Brachial: 183  Ankle-(PT): 150 0.77 Ankle-(DP): 146 0.75          Digit  87 0.45 Resting ankle-brachial index of 1.05 on the right. Toe Pressures of 135 mmHg and TBI of 0.69.  Resting ankle-brachial index of 0.77 on the left. Toe Pressures of 87 mmHg and TBI of 0.45. VPR WAVEFORMS: The right volume plethysmography waveforms are mildly abnormal at the lower thigh level, mildly abnormal at the upper calf level and mildly abnormal at the ankle. The left volume plethysmography waveforms are mildly abnormal at the lower thigh level, mildly abnormal at the upper calf level and mildly abnormal at the ankle. Exercise Testing: MOBLEY KIM TESTING:  Time (min) Grade Level % Rate MPH Level of Pain (1-10) Area of Pain 1 0 2.0       2 0 2.0  2  LEFT CALF  3 2 2.0  2  LEFT CALF  4 2 2.0  3  LEFT CALF  5 4 2.0  6  LEFT CALF  6 4 2.0  7  LEFT CALF  7 6 2.0  7   LEFT CALF/ STOPPED.  Post Exercise Pressures: Location: Rest 1 minute 3 minute 5 minute 7 minute 10 minute Right Ankle  196 192 180 180 168 Left Ankle  58 70 90 100 118 Right Brachial 195 190 174 181 182 181 Right NANCY: 1.05 1.03 1.10 0.99 0.99 0.93  Left NANCY: 0.77 0.31 0.40 0.50 0.55 0.65 Impression: 1. RIGHT LOWER EXTREMITY: NANCY is Normal with an NANCY of 1.05. and Mildly abnormal, but adequate for healing toe pressures of 135 mmHg. 2. LEFT LOWER EXTREMITY: NANCY is Abnormal with an NANCY of 0.77. and Mildly abnormal, but adequate for healing toe pressures of 87 mmHg. 3. MOBLEY KIM TESTING: Patient able to walk for a total of 7 minutes with pain starting at 2 minutes in the left calf. Significant drop in NANCY supports that the patient's symptoms may be consistent with vascular claudication. Reference: Wound classification Grade NANCY Ankle Systolic Pressure Toe Pressures 0 > 0.80 > 100 mmHg > 60 mmHg 1 0.6 - 0.79 70 - 100 mmHg 40 - 59 mmHg 2 0.4 - 0.59 50-70 mmHg 30 - 39 mmHg 3 < 0.39 < 50 mmHg < 30 mmHg Digit Pressures DBI Disease Category > 0.70 Normal < 0.70 Abnormal > 30 mmHg Potential wound healing < 30 mmHg Impaired wound healing Ankle Brachial Pressures NANCY Disease Category > 1.3  Likely vessel calcification with monophasic waveforms, non-diagnostic 0.95-1.30 Normal with multiphasic waveforms 0.50-0.95 Single level disease 0.30-0.50 Multilevel disease < 0.30 Critical limb ischema Volume Plethysmography Recording (VPR) at all levels Normal Sharp systolic peak, fast upstroke, prominent dicrotic notch in wave Mild Sharp systolic peak, fast upstroke, absent dicrotic notch in wave Moderate Flattened systolic peak, slowed upstroke, absent dicrotic notch inwave Severe amplitude wave with = upslope and down slope Occluded Flat Line Post Exercise Stress Testing Normal Post Stress NANCY > resting NANCY, or Ankle systolic pressure falls < 20% below resting NANCY with return to baseline in 3 minutes Abnormal  Ankle systolic pressure falls > 20% below resting NANCY and /or without return to baseline within 3 minutes       I personally reviewed the images and my interpretation is that her ABIs on the left have diminished from normal to 0.77.  On duplex imaging there appears to  be a mid SFA stenosis..    LABS:      Sodium   Date Value Ref Range Status   07/11/2022 138 136 - 145 mmol/L Final   05/16/2022 141 136 - 145 mmol/L Final   04/01/2022 141 136 - 145 mmol/L Final     Urea Nitrogen   Date Value Ref Range Status   07/11/2022 18 8 - 22 mg/dL Final   05/16/2022 15 8 - 22 mg/dL Final   04/01/2022 16 8 - 22 mg/dL Final     Hemoglobin   Date Value Ref Range Status   07/11/2022 13.1 11.7 - 15.7 g/dL Final   05/16/2022 13.7 11.7 - 15.7 g/dL Final   04/01/2022 14.6 11.7 - 15.7 g/dL Final     Platelet Count   Date Value Ref Range Status   07/11/2022 249 150 - 450 10e3/uL Final   05/16/2022 272 150 - 450 10e3/uL Final   04/01/2022 287 150 - 450 10e3/uL Final     INR   Date Value Ref Range Status   09/17/2019 0.84 (L) 0.90 - 1.10 Final     This was a video visit with the start time of 10:10 AM and end time of 10:23 AM  30 minutes spent on the day of encounter doing chart review, history and exam, documentation, and further activities as noted.     Ykui Ponce MD, MD  VASCULAR SURGERY

## 2022-08-04 NOTE — PATIENT INSTRUCTIONS
A Walking Program for Peripheral Arterial Disease (PAD)    Peripheral arterial disease (PAD) is a condition where the arteries in the legs are severely damaged. When you have PAD, walking can be painful. So you may start to walk less. Walking less makes your leg muscles weaker. It then becomes harder and more painful to walk. A walking program can break this cycle. Here's how to get started.       Walking farther without pain  Exercise strengthens leg muscles that are out of shape. It also trains these muscles to work with less oxygen. This helps your leg muscles work better even with reduced blood flow to your legs. When you have PAD, a walking program can be helpful. Your program can:   Help you walk longer and farther without claudication. This is an ache in your legs during exercise that goes away with rest.   Let you do more and be more active  Add to your overall health and well-being  Help you control your blood sugar and blood pressure  Help you become healthier with no risk and at little or no cost  Getting started  Your local hospital, vascular center, or cardiac rehab center may have a special walking program for people with PAD. If so, this is your best option. But if you can t find a program, or it s not covered by insurance, you can still walk on your own. Follow these steps at each session:   Step 1. Start at a pace that lets you walk for 5 to 10 minutes before you start to feel claudication. This feeling is unpleasant, but it doesn t hurt you. Keep going until the pain makes you feel that you need to stop.   Step 2. Stop and rest for 3 to 5 minutes, just long enough for the pain to go away. You can rest standing or sitting. (Some people like to bring along a cane or a lightweight folding chair.)   Step 3. Again walk at a pace that lets you walk for 5 to 10 minutes more before you feel pain. This may be slower than your starting pace in step 1. Then rest again.   Step 4. Repeat this process until  you ve walked for 45 minutes. This should be about 60 to 80 minutes total, including resting time. You may not be able to do a full 45 minutes at first. Do as much as you can, and increase your time as you improve.   Making the most of your program  Walk at least 3 times a week. Take no more than 2 days off between sessions. If you stop walking, even for a week or two, you can lose the health benefits of your program.   Find a good place to walk. A treadmill or a track may be better at first. That way, you won t run the risk of going too far and finding that you can t walk back. Be sure to have a place to walk indoors in bad weather, such as a gym or a mall.   Wear shoes with sturdy, flexible soles. The heel should fit without slipping. You should have room to wiggle your toes.   Keep track of how long you walk. A pedometer will show your daily progress. It can also show how much farther you can walk as time goes by.   Ask a friend to keep you company. You may enjoy walking with someone else. Or you may want to make your walking sessions a time to relax by yourself.   Make it fun. Listen to music while you walk and rest. Walk in a favorite park. Get to know the people at the gym, or the folks that you pass on your route. While you rest, you can window-shop, read, or feed the birds. Do whatever will help you enjoy your exercise sessions.       3794-8623 [employer]. [address] [place] [address], [place] [address]. All rights reserved. This information is not intended as a substitute for professional medical care. Always follow your healthcare professional's instructions.     [facility]  [facility] is excited to announce that we have opened a peripheral arterial  disease rehabilitation program. Medicare has approved funding for this program as of October 2017. Medicare calls the program (SET) Supervised Exercise Therapy.  Who qualifies?  Patients with the diagnosis of  Peripheral Arterial Disease (PAD) must have a face-to-face visit with the physician responsible for PAD treatment to obtain the referral for SET. At this visit, the patient must receive information regarding cardiovascular disease and PAD risk factor reduction, which could include education, counseling, behavioral interventions, and outcome assessments.  What is covered?  Medicare Administrative Contractors ([facility]) have the discretion to cover SET for 36 sessions  over 12 weeks and may cover an additional 36 sessions over an extended period of time. A second face-to-face office visit and referral is required for these additional sessions.  Who is not covered?  SET is non-covered for patients with absolute contraindications to exercise as determined by their  primary physician. A patient is required to be ambulatory.      Please contact scheduling line to make their first appointment: Phone: [phone] Toll-free: [phone]     Locations:   [facility], [address] [place] [id number]     Ankle-Brachial Index (NANCY) or Physiologic Test    Description  An ankle-brachial index test is relatively pain free. Blood pressure cuffs of various sizes are placed on your thigh, calf, foot and toes.  Similar to having your blood pressure checked with an arm cuff, as the technician inflates the cuffs, they progressively tighten and are then quickly released.  You may feel some discomfort, but generally for less than 60 seconds for each measurement. You will be asked to remove your socks and shoes and possibly your pants or shorts. Gowns will be provided. It usually takes about 30-60 minutes.   Depending on the initial readings and patient symptoms, you may be asked to perform a light walk on a treadmill.  The technician will apply ultrasound gel, usually warmed for your comfort, to your ankles and wrists. Through the gel, the technician will use a small hand-held device that emits sound waves.  Risks  There  are typically no side effects or complications associated with a physiologic study.  How to Prepare  Eat and take medications as usual.  There is no preparation required for an ankle-brachial index (NANCY) or physiologic exam.  What Can I Expect After the Test?  The technician will send the ultrasound images to your vascular surgeon for evaluation. Typically, a report is available in 2-3 days. If anything critical is found, it is standard practice to notify the vascular surgeon immediately.  Reference: https://vascular.org/patient-resources/vascular-tests

## 2022-08-04 NOTE — PROGRESS NOTES
"[facility]        Patient is here for a evaluation to discuss Peripheral artery disease (PAD). Symptoms include claudicaton: left calf at distance of 25 feet in left lower extremity.  Patient states pain gets to be \"too bad to walk\".    Pt is currently taking Aspirin and Statin.    There were no vitals taken for this visit. Virtual visit.    The provider has been notified that the patient has concerns of left calf pain with walking.     Questions patient would like addressed today are: N/A.    Refills are needed: No    Has homecare services and agency name:  Lidia Sanches RN      "

## 2022-08-24 ENCOUNTER — HOSPITAL ENCOUNTER (OUTPATIENT)
Dept: CARDIAC REHAB | Facility: HOSPITAL | Age: 67
Discharge: HOME | End: 2022-08-24
Attending: SURGERY
Payer: COMMERCIAL

## 2022-08-24 DIAGNOSIS — I70.222 ATHEROSCLEROSIS OF NATIVE ARTERY OF LEFT LOWER EXTREMITY WITH REST PAIN (H): ICD-10-CM

## 2022-08-24 PROCEDURE — 93668 PERIPHERAL VASCULAR REHAB: CPT

## 2022-08-29 ENCOUNTER — HOSPITAL ENCOUNTER (OUTPATIENT)
Dept: CARDIAC REHAB | Facility: HOSPITAL | Age: 67
Discharge: HOME | End: 2022-08-29
Attending: INTERNAL MEDICINE
Payer: COMMERCIAL

## 2022-08-29 PROCEDURE — 93668 PERIPHERAL VASCULAR REHAB: CPT

## 2022-08-31 ENCOUNTER — HOSPITAL ENCOUNTER (OUTPATIENT)
Dept: CARDIAC REHAB | Facility: HOSPITAL | Age: 67
Discharge: HOME | End: 2022-08-31
Attending: INTERNAL MEDICINE
Payer: COMMERCIAL

## 2022-08-31 PROCEDURE — 93668 PERIPHERAL VASCULAR REHAB: CPT

## 2022-09-07 ENCOUNTER — HOSPITAL ENCOUNTER (OUTPATIENT)
Dept: CARDIAC REHAB | Facility: HOSPITAL | Age: 67
Discharge: HOME | End: 2022-09-07
Attending: INTERNAL MEDICINE
Payer: COMMERCIAL

## 2022-09-07 PROCEDURE — 93668 PERIPHERAL VASCULAR REHAB: CPT

## 2022-09-12 ENCOUNTER — HOSPITAL ENCOUNTER (OUTPATIENT)
Dept: CARDIAC REHAB | Facility: HOSPITAL | Age: 67
Discharge: HOME | End: 2022-09-12
Attending: INTERNAL MEDICINE
Payer: COMMERCIAL

## 2022-09-12 PROCEDURE — 93668 PERIPHERAL VASCULAR REHAB: CPT

## 2022-09-16 ENCOUNTER — HOSPITAL ENCOUNTER (OUTPATIENT)
Dept: CARDIAC REHAB | Facility: HOSPITAL | Age: 67
Discharge: HOME | End: 2022-09-16
Attending: INTERNAL MEDICINE
Payer: COMMERCIAL

## 2022-09-16 PROCEDURE — 93668 PERIPHERAL VASCULAR REHAB: CPT

## 2022-09-19 ENCOUNTER — HOSPITAL ENCOUNTER (OUTPATIENT)
Dept: CARDIAC REHAB | Facility: HOSPITAL | Age: 67
Discharge: HOME | End: 2022-09-19
Attending: INTERNAL MEDICINE
Payer: COMMERCIAL

## 2022-09-19 PROCEDURE — 93668 PERIPHERAL VASCULAR REHAB: CPT

## 2022-09-20 ENCOUNTER — OFFICE VISIT (OUTPATIENT)
Dept: CARDIOLOGY | Facility: CLINIC | Age: 67
End: 2022-09-20
Payer: COMMERCIAL

## 2022-09-20 VITALS
DIASTOLIC BLOOD PRESSURE: 78 MMHG | BODY MASS INDEX: 27.53 KG/M2 | HEART RATE: 86 BPM | SYSTOLIC BLOOD PRESSURE: 142 MMHG | RESPIRATION RATE: 16 BRPM | WEIGHT: 160.4 LBS

## 2022-09-20 DIAGNOSIS — E66.811 OBESITY, CLASS I, BMI 30-34.9: ICD-10-CM

## 2022-09-20 DIAGNOSIS — I73.9 PERIPHERAL VASCULAR DISEASE (H): ICD-10-CM

## 2022-09-20 DIAGNOSIS — I25.83 CORONARY ARTERIOSCLEROSIS DUE TO LIPID RICH PLAQUE: Primary | ICD-10-CM

## 2022-09-20 DIAGNOSIS — F17.200 SMOKING ADDICTION: ICD-10-CM

## 2022-09-20 DIAGNOSIS — I65.23 BILATERAL CAROTID ARTERY STENOSIS: ICD-10-CM

## 2022-09-20 DIAGNOSIS — I10 BENIGN ESSENTIAL HYPERTENSION: ICD-10-CM

## 2022-09-20 DIAGNOSIS — E78.00 PURE HYPERCHOLESTEROLEMIA: ICD-10-CM

## 2022-09-20 PROCEDURE — 99214 OFFICE O/P EST MOD 30 MIN: CPT | Performed by: INTERNAL MEDICINE

## 2022-09-20 RX ORDER — CILOSTAZOL 100 MG/1
100 TABLET ORAL 2 TIMES DAILY
Qty: 28 TABLET | Refills: 1 | Status: SHIPPED | OUTPATIENT
Start: 2022-09-20 | End: 2022-11-11

## 2022-09-20 NOTE — PROGRESS NOTES
[facility]  Heart Care Clinic Follow-up Note    Assessment & Plan        (I25.10,  I25.83) Coronary arteriosclerosis due to lipid rich plaque  (primary encounter diagnosis)  Comment: Due to abnormal nuclear stress test angiography showed normal left main, LAD with distal 30 to 40% stenosis, normal nondominant circumflex and normal dominant right coronary artery.  Need to work on prevention.    (I73.9) Peripheral vascular disease (H)  Comment: Based on noninvasive testing left lower extremity has unremarkable common iliac, external iliac, common femoral but distal superficial femoral artery significant stenosis.  Patent popliteal and patent infrapopliteal vessels.  Right side has unremarkable common iliac, external iliac, common femoral with patent popliteal, posterior tibial and anterior tibial, and dorsalis pedis.  She is in a walking program, still getting claudication, will try Pletal.  2-week sample given to her and if it were she will call us and we will get a 90-day supply.  Did warn her of shortness of breath.    (I65.23) Bilateral carotid artery stenosis  Comment: Based on carotid ultrasound from 2019 she has a 50 to 70% stenosis of the left ICA as well as the right ICA.  Repeat this in 2023.    (F17.200) Smoking addiction  Comment: She has stopped smoking and I congratulated her.    (E78.00) Pure hypercholesterolemia  Comment: Total cholesterol 144 with an LDL of 73, given the above ideally would like to slow her and we will have her increase her Crestor to 20 mg, if tolerated will get a 20 mg prescription.  No LP(a) is elevated.    (I10) Benign essential hypertension  Comment: Elevated today, will see if it comes down slightly with Pletal, if not we will start Norvasc.    (E66.9) Obesity, Class I, BMI 30-34.9  Comment: Continue to work on weight loss.    Plan  1.  Stay off cigarettes.  2.  Work on weight loss.  3.  Try Pletal 100 mg by mouth twice a day, if after 2-week supply tolerates it would  then give her a 90-day supply.  4.  After Pletal started, increase rosuvastatin up to 20 mg, will need fasting lipids and repeat LFTs 2 months thereafter.  5.  Monitor blood pressures, if elevated will need to start Norvasc 5 mg a day.  6.  Follow-up with me in 1 year or sooner if needed.    Subjective  CC: 66-year-old white female being seen in post hospital discharge follow-up.  She complains of claudication of her left lower extremity after about 25 feet.  Otherwise no chest pains, palpitations, significant shortness of breath, PND, orthopnea or peripheral edema.  She is still working as a  for [employer].    Medications  Current Outpatient Medications   Medication Sig Dispense Refill     Ascorbic Acid (VITAMIN C PO) Take 500 mg by mouth daily       aspirin 81 MG EC tablet [ASPIRIN 81 MG EC TABLET] Take 81 mg by mouth daily.       buPROPion (WELLBUTRIN XL) 150 MG 24 hr tablet Take 1 tablet (150 mg) by mouth every morning 90 tablet 1     calcium carbonate (OS-KATELYN) 1500 (600 Ca) MG tablet Take 1,200 mg by mouth daily       cholecalciferol, vitamin D3, (D3-50 CHOLECALCIFEROL) 50,000 unit capsule [CHOLECALCIFEROL, VITAMIN D3, (D3-50 CHOLECALCIFEROL) 50,000 UNIT CAPSULE] Take 2,000 Units by mouth daily.              cilostazol (PLETAL) 100 MG tablet Take 1 tablet (100 mg) by mouth 2 times daily 28 tablet 1     co-enzyme Q-10 30 mg capsule [CO-ENZYME Q-10 30 MG CAPSULE] Take 200 mg by mouth daily.              ibuprofen (ADVIL,MOTRIN) 200 MG tablet [IBUPROFEN (ADVIL,MOTRIN) 200 MG TABLET] Take 200 mg by mouth every 6 (six) hours as needed for pain.       rosuvastatin (CRESTOR) 10 MG tablet Take 1 tablet (10 mg) by mouth daily 90 tablet 3     VITAMIN E PO Take 1 tablet by mouth daily 400 units       clobetasol (TEMOVATE) 0.05 % external ointment Apply topically 2 times daily To palms until resolved and use sparingly (Patient not taking: Reported on 9/20/2022) 60 g 1       Objective  BP (!) 142/78 (BP Location:  Right arm, Patient Position: Sitting, Cuff Size: Adult Regular)   Pulse 86   Resp 16   Wt 72.8 kg (160 lb 6.4 oz)   BMI 27.53 kg/m      General Appearance:    Alert, cooperative, no distress, appears stated age   Head:    Normocephalic, without obvious abnormality, atraumatic   Throat:   Lips, mucosa, and tongue normal; teeth and gums normal   Neck:   Supple, symmetrical, trachea midline, no adenopathy;        thyroid:  No enlargement/tenderness/nodules; no carotid    bruit or JVD   Back:     Symmetric, no curvature, ROM normal, no CVA tenderness   Lungs:     Clear to auscultation bilaterally, respirations unlabored   Chest wall:    No tenderness or deformity   Heart:    Regular rate and rhythm, S1 and S2 normal, no murmur, rub   or gallop   Abdomen:     Soft, non-tender, bowel sounds active all four quadrants,     no masses, no organomegaly   Extremities:   Normal, atraumatic, no cyanosis or edema   Pulses:   2+ and symmetric all extremities except absent left lower extremity   Skin:   Skin color, texture, turgor normal, no rashes or lesions     Results    Lab Results personally reviewed   Lab Results   Component Value Date    CHOL 144 07/11/2022    CHOL 142 11/26/2019     Lab Results   Component Value Date    HDL 51 07/11/2022    HDL 51 11/26/2019     No components found for: LDLCALC  Lab Results   Component Value Date    TRIG 99 07/11/2022    TRIG 104 11/26/2019     Lab Results   Component Value Date    WBC 7.8 07/11/2022    HGB 13.1 07/11/2022    HCT 40.3 07/11/2022     07/11/2022     Lab Results   Component Value Date    BUN 18 07/11/2022     07/11/2022    CO2 29 07/11/2022

## 2022-09-20 NOTE — LETTER
9/20/2022    Hector Luu MD  [facility] [address], [address]  [place] [address]    RE: Amirah Oakley       Dear Colleague,     I had the pleasure of seeing Amirah Oakley in the [facility].      [facility]  Heart Care Clinic Follow-up Note    Assessment & Plan        (I25.10,  I25.83) Coronary arteriosclerosis due to lipid rich plaque  (primary encounter diagnosis)  Comment: Due to abnormal nuclear stress test angiography showed normal left main, LAD with distal 30 to 40% stenosis, normal nondominant circumflex and normal dominant right coronary artery.  Need to work on prevention.    (I73.9) Peripheral vascular disease (H)  Comment: Based on noninvasive testing left lower extremity has unremarkable common iliac, external iliac, common femoral but distal superficial femoral artery significant stenosis.  Patent popliteal and patent infrapopliteal vessels.  Right side has unremarkable common iliac, external iliac, common femoral with patent popliteal, posterior tibial and anterior tibial, and dorsalis pedis.  She is in a walking program, still getting claudication, will try Pletal.  2-week sample given to her and if it were she will call us and we will get a 90-day supply.  Did warn her of shortness of breath.    (I65.23) Bilateral carotid artery stenosis  Comment: Based on carotid ultrasound from 2019 she has a 50 to 70% stenosis of the left ICA as well as the right ICA.  Repeat this in 2023.    (F17.200) Smoking addiction  Comment: She has stopped smoking and I congratulated her.    (E78.00) Pure hypercholesterolemia  Comment: Total cholesterol 144 with an LDL of 73, given the above ideally would like to slow her and we will have her increase her Crestor to 20 mg, if tolerated will get a 20 mg prescription.  No LP(a) is elevated.    (I10) Benign essential hypertension  Comment: Elevated today, will see if it comes down slightly with Pletal, if  not we will start Norvasc.    (E66.9) Obesity, Class I, BMI 30-34.9  Comment: Continue to work on weight loss.    Plan  1.  Stay off cigarettes.  2.  Work on weight loss.  3.  Try Pletal 100 mg by mouth twice a day, if after 2-week supply tolerates it would then give her a 90-day supply.  4.  After Pletal started, increase rosuvastatin up to 20 mg, will need fasting lipids and repeat LFTs 2 months thereafter.  5.  Monitor blood pressures, if elevated will need to start Norvasc 5 mg a day.  6.  Follow-up with me in 1 year or sooner if needed.    Subjective  CC: 66-year-old white female being seen in post hospital discharge follow-up.  She complains of claudication of her left lower extremity after about 25 feet.  Otherwise no chest pains, palpitations, significant shortness of breath, PND, orthopnea or peripheral edema.  She is still working as a  for [employer].    Medications  Current Outpatient Medications   Medication Sig Dispense Refill     Ascorbic Acid (VITAMIN C PO) Take 500 mg by mouth daily       aspirin 81 MG EC tablet [ASPIRIN 81 MG EC TABLET] Take 81 mg by mouth daily.       buPROPion (WELLBUTRIN XL) 150 MG 24 hr tablet Take 1 tablet (150 mg) by mouth every morning 90 tablet 1     calcium carbonate (OS-KATELYN) 1500 (600 Ca) MG tablet Take 1,200 mg by mouth daily       cholecalciferol, vitamin D3, (D3-50 CHOLECALCIFEROL) 50,000 unit capsule [CHOLECALCIFEROL, VITAMIN D3, (D3-50 CHOLECALCIFEROL) 50,000 UNIT CAPSULE] Take 2,000 Units by mouth daily.              cilostazol (PLETAL) 100 MG tablet Take 1 tablet (100 mg) by mouth 2 times daily 28 tablet 1     co-enzyme Q-10 30 mg capsule [CO-ENZYME Q-10 30 MG CAPSULE] Take 200 mg by mouth daily.              ibuprofen (ADVIL,MOTRIN) 200 MG tablet [IBUPROFEN (ADVIL,MOTRIN) 200 MG TABLET] Take 200 mg by mouth every 6 (six) hours as needed for pain.       rosuvastatin (CRESTOR) 10 MG tablet Take 1 tablet (10 mg) by mouth daily 90 tablet 3     VITAMIN E PO Take  1 tablet by mouth daily 400 units       clobetasol (TEMOVATE) 0.05 % external ointment Apply topically 2 times daily To palms until resolved and use sparingly (Patient not taking: Reported on 9/20/2022) 60 g 1       Objective  BP (!) 142/78 (BP Location: Right arm, Patient Position: Sitting, Cuff Size: Adult Regular)   Pulse 86   Resp 16   Wt 72.8 kg (160 lb 6.4 oz)   BMI 27.53 kg/m      General Appearance:    Alert, cooperative, no distress, appears stated age   Head:    Normocephalic, without obvious abnormality, atraumatic   Throat:   Lips, mucosa, and tongue normal; teeth and gums normal   Neck:   Supple, symmetrical, trachea midline, no adenopathy;        thyroid:  No enlargement/tenderness/nodules; no carotid    bruit or JVD   Back:     Symmetric, no curvature, ROM normal, no CVA tenderness   Lungs:     Clear to auscultation bilaterally, respirations unlabored   Chest wall:    No tenderness or deformity   Heart:    Regular rate and rhythm, S1 and S2 normal, no murmur, rub   or gallop   Abdomen:     Soft, non-tender, bowel sounds active all four quadrants,     no masses, no organomegaly   Extremities:   Normal, atraumatic, no cyanosis or edema   Pulses:   2+ and symmetric all extremities except absent left lower extremity   Skin:   Skin color, texture, turgor normal, no rashes or lesions     Results    Lab Results personally reviewed   Lab Results   Component Value Date    CHOL 144 07/11/2022    CHOL 142 11/26/2019     Lab Results   Component Value Date    HDL 51 07/11/2022    HDL 51 11/26/2019     No components found for: LDLCALC  Lab Results   Component Value Date    TRIG 99 07/11/2022    TRIG 104 11/26/2019     Lab Results   Component Value Date    WBC 7.8 07/11/2022    HGB 13.1 07/11/2022    HCT 40.3 07/11/2022     07/11/2022     Lab Results   Component Value Date    BUN 18 07/11/2022     07/11/2022    CO2 29 07/11/2022               Thank you for allowing me to participate in the care of  your patient.      Sincerely,     ROSSY STAHL MD     [facility]  cc:   Britney Barriga MD  [address] [place] [address]

## 2022-09-20 NOTE — PATIENT INSTRUCTIONS
Ms Amirah Oakley,  I enjoyed visiting with you again today.  I am glad to hear you are doing well.  Per our conversation try to double up on the ROSUVASTATIN to 20 mg at bedtime and after 2 months give us all call at [phone] for repeat blood tests.  Also try the new med PLETAL twice a day and if better with legs and no side effects call us at same number for 90 days.  I will plan on seeing you 1 year.  Lastly keep tabs on blood pressures and keep numbers less then 130/80.  Esteban Benson

## 2022-09-21 ENCOUNTER — HOSPITAL ENCOUNTER (OUTPATIENT)
Dept: CARDIAC REHAB | Facility: HOSPITAL | Age: 67
Discharge: HOME | End: 2022-09-21
Attending: INTERNAL MEDICINE
Payer: COMMERCIAL

## 2022-09-21 PROCEDURE — 93668 PERIPHERAL VASCULAR REHAB: CPT

## 2022-09-23 ENCOUNTER — HOSPITAL ENCOUNTER (OUTPATIENT)
Dept: CARDIAC REHAB | Facility: HOSPITAL | Age: 67
Discharge: HOME | End: 2022-09-23
Attending: INTERNAL MEDICINE
Payer: COMMERCIAL

## 2022-09-23 PROCEDURE — 93668 PERIPHERAL VASCULAR REHAB: CPT

## 2022-10-03 ENCOUNTER — HOSPITAL ENCOUNTER (OUTPATIENT)
Dept: CARDIAC REHAB | Facility: HOSPITAL | Age: 67
Discharge: HOME | End: 2022-10-03
Attending: INTERNAL MEDICINE
Payer: COMMERCIAL

## 2022-10-03 PROCEDURE — 93668 PERIPHERAL VASCULAR REHAB: CPT

## 2022-10-05 ENCOUNTER — HOSPITAL ENCOUNTER (OUTPATIENT)
Dept: CARDIAC REHAB | Facility: HOSPITAL | Age: 67
Discharge: HOME | End: 2022-10-05
Attending: INTERNAL MEDICINE
Payer: COMMERCIAL

## 2022-10-05 PROCEDURE — 93668 PERIPHERAL VASCULAR REHAB: CPT

## 2022-10-10 ENCOUNTER — HOSPITAL ENCOUNTER (OUTPATIENT)
Dept: CARDIAC REHAB | Facility: HOSPITAL | Age: 67
Discharge: HOME | End: 2022-10-10
Attending: INTERNAL MEDICINE
Payer: COMMERCIAL

## 2022-10-10 PROCEDURE — 93668 PERIPHERAL VASCULAR REHAB: CPT

## 2022-10-12 ENCOUNTER — HOSPITAL ENCOUNTER (OUTPATIENT)
Dept: CARDIAC REHAB | Facility: HOSPITAL | Age: 67
Discharge: HOME | End: 2022-10-12
Attending: INTERNAL MEDICINE
Payer: COMMERCIAL

## 2022-10-12 PROCEDURE — 93668 PERIPHERAL VASCULAR REHAB: CPT

## 2022-10-13 ENCOUNTER — TELEPHONE (OUTPATIENT)
Dept: CARDIOLOGY | Facility: CLINIC | Age: 67
End: 2022-10-13

## 2022-10-13 NOTE — TELEPHONE ENCOUNTER
[place] Community Measures Blood Pressure guideline reviewed.  Patients recent blood pressure is outside of guideline parameters.  Called pt to review, no answer.  Left voicemail message asking patient to check their blood pressure using a home blood pressure cuff or by going to a [facility].  Patient instructed to then call [phone] ([facility]) and leave a message with their name, date of birth, and blood pressure reading that was completed within the last 24 hours and where it was completed.  Will await call back for further review.    Danelle Irizarry LPN

## 2022-10-14 ENCOUNTER — HOSPITAL ENCOUNTER (OUTPATIENT)
Dept: CARDIAC REHAB | Facility: HOSPITAL | Age: 67
Discharge: HOME | End: 2022-10-14
Attending: INTERNAL MEDICINE
Payer: COMMERCIAL

## 2022-10-14 ENCOUNTER — TELEPHONE (OUTPATIENT)
Dept: CARDIOLOGY | Facility: CLINIC | Age: 67
End: 2022-10-14

## 2022-10-14 PROCEDURE — 93668 PERIPHERAL VASCULAR REHAB: CPT

## 2022-10-17 ENCOUNTER — HOSPITAL ENCOUNTER (OUTPATIENT)
Dept: CARDIAC REHAB | Facility: HOSPITAL | Age: 67
Discharge: HOME | End: 2022-10-17
Attending: INTERNAL MEDICINE
Payer: COMMERCIAL

## 2022-10-17 PROCEDURE — 93668 PERIPHERAL VASCULAR REHAB: CPT

## 2022-10-19 ENCOUNTER — HOSPITAL ENCOUNTER (OUTPATIENT)
Dept: CARDIAC REHAB | Facility: HOSPITAL | Age: 67
Discharge: HOME | End: 2022-10-19
Attending: INTERNAL MEDICINE
Payer: COMMERCIAL

## 2022-10-19 PROCEDURE — 93668 PERIPHERAL VASCULAR REHAB: CPT

## 2022-10-21 ENCOUNTER — HOSPITAL ENCOUNTER (OUTPATIENT)
Dept: CARDIAC REHAB | Facility: HOSPITAL | Age: 67
Discharge: HOME | End: 2022-10-21
Attending: INTERNAL MEDICINE
Payer: COMMERCIAL

## 2022-10-21 PROCEDURE — 93668 PERIPHERAL VASCULAR REHAB: CPT

## 2022-10-24 ENCOUNTER — HOSPITAL ENCOUNTER (OUTPATIENT)
Dept: CARDIAC REHAB | Facility: HOSPITAL | Age: 67
Discharge: HOME | End: 2022-10-24
Attending: INTERNAL MEDICINE
Payer: COMMERCIAL

## 2022-10-24 PROCEDURE — 93668 PERIPHERAL VASCULAR REHAB: CPT

## 2022-10-26 ENCOUNTER — HOSPITAL ENCOUNTER (OUTPATIENT)
Dept: CARDIAC REHAB | Facility: HOSPITAL | Age: 67
Discharge: HOME | End: 2022-10-26
Attending: INTERNAL MEDICINE
Payer: COMMERCIAL

## 2022-10-26 PROCEDURE — 93668 PERIPHERAL VASCULAR REHAB: CPT

## 2022-10-28 ENCOUNTER — HOSPITAL ENCOUNTER (OUTPATIENT)
Dept: CARDIAC REHAB | Facility: HOSPITAL | Age: 67
Discharge: HOME | End: 2022-10-28
Attending: INTERNAL MEDICINE
Payer: COMMERCIAL

## 2022-10-28 PROCEDURE — 93668 PERIPHERAL VASCULAR REHAB: CPT

## 2022-10-31 ENCOUNTER — HOSPITAL ENCOUNTER (OUTPATIENT)
Dept: CARDIAC REHAB | Facility: HOSPITAL | Age: 67
Discharge: HOME | End: 2022-10-31
Attending: INTERNAL MEDICINE
Payer: COMMERCIAL

## 2022-10-31 PROCEDURE — 93668 PERIPHERAL VASCULAR REHAB: CPT

## 2022-11-02 ENCOUNTER — HOSPITAL ENCOUNTER (OUTPATIENT)
Dept: CARDIAC REHAB | Facility: HOSPITAL | Age: 67
Discharge: HOME | End: 2022-11-02
Attending: INTERNAL MEDICINE
Payer: COMMERCIAL

## 2022-11-02 PROCEDURE — 93668 PERIPHERAL VASCULAR REHAB: CPT

## 2022-11-03 ENCOUNTER — TELEPHONE (OUTPATIENT)
Dept: CARDIOLOGY | Facility: CLINIC | Age: 67
End: 2022-11-03

## 2022-11-03 DIAGNOSIS — I73.9 PERIPHERAL VASCULAR DISEASE (H): ICD-10-CM

## 2022-11-03 DIAGNOSIS — I77.9 CAROTID ARTERY DISEASE, UNSPECIFIED LATERALITY (H): ICD-10-CM

## 2022-11-03 DIAGNOSIS — I25.83 CORONARY ARTERIOSCLEROSIS DUE TO LIPID RICH PLAQUE: Primary | ICD-10-CM

## 2022-11-03 NOTE — TELEPHONE ENCOUNTER
----- Message from Esteban Benson MD sent at 9/20/2022 10:10 AM CDT -----  If you can put on your radar to call this lady in about a month, I have asked her to double her Crestor as well as to try Pletal.  She may need new Crestor 20 mg tablets and may need Pletal 100 mg twice a day 90-day supply.  My other concern is to keep tabs on her blood pressure, if blood pressures are elevated we will need to start Norvasc.  Lastly, when she is done doubling her Crestor we will need to get repeat fasting lipids and LFTs 2 months thereafter.  I am not sure about her compliance and thus the reason I am involving you.  LF

## 2022-11-07 NOTE — TELEPHONE ENCOUNTER
LM to check in on regarding medication changes that LBF made back during September visit. Left direct line to call back to discuss and to inquire if she needed any refills based on these changes. Orders for repeat blood work placed; await call back to discuss. -ev

## 2022-11-11 RX ORDER — ROSUVASTATIN CALCIUM 20 MG/1
20 TABLET, COATED ORAL DAILY
Qty: 90 TABLET | Refills: 0
Start: 2022-11-11

## 2022-11-11 RX ORDER — CILOSTAZOL 100 MG/1
100 TABLET ORAL 2 TIMES DAILY
Qty: 180 TABLET | Refills: 1 | Status: SHIPPED | OUTPATIENT
Start: 2022-11-11 | End: 2023-05-01

## 2022-11-11 NOTE — TELEPHONE ENCOUNTER
Received a call back from Amirah. She is agreeable to blood work and reports that she is tolerating her medicines well. Rx list updated + pletal renewed. Transferred to schedulers to arrange  Blood work. -[facility]

## 2022-11-14 ENCOUNTER — HOSPITAL ENCOUNTER (OUTPATIENT)
Dept: CARDIAC REHAB | Facility: HOSPITAL | Age: 67
Discharge: HOME | End: 2022-11-14
Attending: INTERNAL MEDICINE
Payer: COMMERCIAL

## 2022-11-14 PROCEDURE — 93668 PERIPHERAL VASCULAR REHAB: CPT

## 2022-11-16 ENCOUNTER — HOSPITAL ENCOUNTER (OUTPATIENT)
Dept: CARDIAC REHAB | Facility: HOSPITAL | Age: 67
Discharge: HOME | End: 2022-11-16
Attending: INTERNAL MEDICINE
Payer: COMMERCIAL

## 2022-11-16 PROCEDURE — 93668 PERIPHERAL VASCULAR REHAB: CPT

## 2022-11-21 ENCOUNTER — HOSPITAL ENCOUNTER (OUTPATIENT)
Dept: CARDIAC REHAB | Facility: HOSPITAL | Age: 67
Discharge: HOME | End: 2022-11-21
Attending: INTERNAL MEDICINE
Payer: COMMERCIAL

## 2022-11-21 ENCOUNTER — HEALTH MAINTENANCE LETTER (OUTPATIENT)
Age: 67
End: 2022-11-21

## 2022-11-21 PROCEDURE — 93668 PERIPHERAL VASCULAR REHAB: CPT

## 2022-11-23 ENCOUNTER — HOSPITAL ENCOUNTER (OUTPATIENT)
Dept: CARDIAC REHAB | Facility: HOSPITAL | Age: 67
Discharge: HOME | End: 2022-11-23
Attending: INTERNAL MEDICINE
Payer: COMMERCIAL

## 2022-11-23 PROCEDURE — 93668 PERIPHERAL VASCULAR REHAB: CPT

## 2022-11-25 ENCOUNTER — HOSPITAL ENCOUNTER (OUTPATIENT)
Dept: CARDIAC REHAB | Facility: HOSPITAL | Age: 67
Discharge: HOME | End: 2022-11-25
Attending: INTERNAL MEDICINE
Payer: COMMERCIAL

## 2022-11-25 PROCEDURE — 93668 PERIPHERAL VASCULAR REHAB: CPT

## 2022-11-30 ENCOUNTER — HOSPITAL ENCOUNTER (OUTPATIENT)
Dept: CARDIAC REHAB | Facility: HOSPITAL | Age: 67
Discharge: HOME | End: 2022-11-30
Attending: INTERNAL MEDICINE
Payer: COMMERCIAL

## 2022-11-30 ENCOUNTER — LAB (OUTPATIENT)
Dept: CARDIOLOGY | Facility: CLINIC | Age: 67
End: 2022-11-30
Payer: COMMERCIAL

## 2022-11-30 DIAGNOSIS — I25.83 CORONARY ARTERIOSCLEROSIS DUE TO LIPID RICH PLAQUE: Primary | ICD-10-CM

## 2022-11-30 LAB
ALBUMIN SERPL BCG-MCNC: 4.7 G/DL (ref 3.5–5.2)
ALP SERPL-CCNC: 74 U/L (ref 35–104)
ALT SERPL W P-5'-P-CCNC: 47 U/L (ref 10–35)
AST SERPL W P-5'-P-CCNC: 38 U/L (ref 10–35)
BILIRUB DIRECT SERPL-MCNC: <0.2 MG/DL (ref 0–0.3)
BILIRUB SERPL-MCNC: 0.4 MG/DL
CHOLEST SERPL-MCNC: 162 MG/DL
HDLC SERPL-MCNC: 65 MG/DL
LDLC SERPL CALC-MCNC: 67 MG/DL
NONHDLC SERPL-MCNC: 97 MG/DL
PROT SERPL-MCNC: 7.3 G/DL (ref 6.4–8.3)
TRIGL SERPL-MCNC: 149 MG/DL

## 2022-11-30 PROCEDURE — 80061 LIPID PANEL: CPT

## 2022-11-30 PROCEDURE — 93668 PERIPHERAL VASCULAR REHAB: CPT

## 2022-11-30 PROCEDURE — 36415 COLL VENOUS BLD VENIPUNCTURE: CPT

## 2022-11-30 PROCEDURE — 80076 HEPATIC FUNCTION PANEL: CPT

## 2022-12-02 ENCOUNTER — HOSPITAL ENCOUNTER (OUTPATIENT)
Dept: CARDIAC REHAB | Facility: HOSPITAL | Age: 67
Discharge: HOME | End: 2022-12-02
Attending: INTERNAL MEDICINE
Payer: COMMERCIAL

## 2022-12-02 PROCEDURE — 93668 PERIPHERAL VASCULAR REHAB: CPT

## 2022-12-07 ENCOUNTER — HOSPITAL ENCOUNTER (OUTPATIENT)
Dept: CARDIAC REHAB | Facility: HOSPITAL | Age: 67
Discharge: HOME | End: 2022-12-07
Attending: INTERNAL MEDICINE
Payer: COMMERCIAL

## 2022-12-07 PROCEDURE — 93668 PERIPHERAL VASCULAR REHAB: CPT

## 2022-12-09 ENCOUNTER — HOSPITAL ENCOUNTER (OUTPATIENT)
Dept: CARDIAC REHAB | Facility: HOSPITAL | Age: 67
Discharge: HOME | End: 2022-12-09
Attending: INTERNAL MEDICINE
Payer: COMMERCIAL

## 2022-12-09 PROCEDURE — 93668 PERIPHERAL VASCULAR REHAB: CPT

## 2022-12-12 ENCOUNTER — HOSPITAL ENCOUNTER (OUTPATIENT)
Dept: CARDIAC REHAB | Facility: HOSPITAL | Age: 67
Discharge: HOME | End: 2022-12-12
Attending: INTERNAL MEDICINE
Payer: COMMERCIAL

## 2022-12-12 PROCEDURE — 93668 PERIPHERAL VASCULAR REHAB: CPT

## 2022-12-19 ENCOUNTER — OFFICE VISIT (OUTPATIENT)
Dept: VASCULAR SURGERY | Facility: CLINIC | Age: 67
End: 2022-12-19
Attending: SURGERY
Payer: COMMERCIAL

## 2022-12-19 ENCOUNTER — ANCILLARY PROCEDURE (OUTPATIENT)
Dept: VASCULAR ULTRASOUND | Facility: CLINIC | Age: 67
End: 2022-12-19
Attending: SURGERY
Payer: COMMERCIAL

## 2022-12-19 VITALS
SYSTOLIC BLOOD PRESSURE: 138 MMHG | DIASTOLIC BLOOD PRESSURE: 80 MMHG | RESPIRATION RATE: 16 BRPM | HEART RATE: 80 BPM | TEMPERATURE: 97.4 F

## 2022-12-19 DIAGNOSIS — I70.222 ATHEROSCLEROSIS OF NATIVE ARTERY OF LEFT LOWER EXTREMITY WITH REST PAIN (H): ICD-10-CM

## 2022-12-19 DIAGNOSIS — I70.222 ATHEROSCLEROSIS OF NATIVE ARTERY OF LEFT LOWER EXTREMITY WITH REST PAIN (H): Primary | ICD-10-CM

## 2022-12-19 PROCEDURE — G0463 HOSPITAL OUTPT CLINIC VISIT: HCPCS | Mod: 25 | Performed by: SURGERY

## 2022-12-19 PROCEDURE — 93924 LWR XTR VASC STDY BILAT: CPT

## 2022-12-19 PROCEDURE — 93924 LWR XTR VASC STDY BILAT: CPT | Mod: 26 | Performed by: SURGERY

## 2022-12-19 PROCEDURE — 99213 OFFICE O/P EST LOW 20 MIN: CPT | Performed by: SURGERY

## 2022-12-19 NOTE — PROGRESS NOTES
VASCULAR SURGERY OUTPATIENT CONSULT OR VISIT   VASCULAR SURGEON: Yuki Ponce MD    LOCATION:  [facility]    Amirah Oakley   Medical Record #:  [id number]  YOB: 1955  Age:  67 year old     Date of Service: 12/19/2022    PRIMARY CARE PROVIDER: Hector Luu      Reason for consultation: Evaluation of PAD    IMPRESSION: Patient doing extremely well on supervised exercise regimen with improvement of walking distance from 7 minutes to 12 minutes.  Also on cilostazol and tolerating it well.  Not smoking.    RECOMMENDATION: Doing extremely well and appropriately proud of her success.  We will continue with an exercise regimen even once she is finished a supervised program.  We will see her to see that things are still going well in a years time with repeat Alcaraz Fong testing.  We will schedule appointment with Marcy for then.    HPI:  Amirah Oakley is a 67 year old female who was seen today for follow-up of her PAD.  This is a woman who we had treated with SFA angioplasty for extremely short distance claudication.  Went back to smoking and developed restenosis with recurrence of her symptoms.  On imaging we could see recurrent stenosis.  Went on to quit smoking for about 2 months and her last visit in August we recommended a supervised exercise regimen rather than repeat angioplasty.  Patient has been doing this and has noted dramatic improvements of her walking distance.  She says that in the program she can even walk as far as 15 minutes.  In her day-to-day regular life she really has no claudication at all anymore.  Also on cilostazol which she is tolerating without side effects.  Has not gone back to smoking.  Overall appropriately proud of her success.    PHH:    Past Medical History:   Diagnosis Date     Acute bronchitis with bronchospasm 11/28/2015     Patellar Chondromalacia     Created by Conversion      Tibia/fibula fracture 11/27/2015     Upper  Back Pain (Between Shoulder Blades)     Created by Conversion         Past Surgical History:   Procedure Laterality Date     CV CORONARY ANGIOGRAM N/A 7/11/2022    Procedure: Coronary Angiogram;  Surgeon: Britney Barriga MD;  Location: [facility] CV     CV LEFT HEART CATH N/A 7/11/2022    Procedure: Left Heart Catheterization;  Surgeon: Britney Barriga MD;  Location: [facility] CV     HC OPEN RX WEIGHTBEAR TIB,FIX TIB ONLY Left 11/27/2015    Procedure: OPEN REDUCTION INTERNAL FIXATION LEFT TIBIAL FIBULAR FRACTURE;  Surgeon: Roberto Zepeda MD;  Location: [facility];  Service: Orthopedics     HC REMOVAL GALLBLADDER  1984    Description: Cholecystectomy;  Recorded: 01/08/2010;     HYSTERECTOMY  2002     IR EXTREMITY ANGIOGRAM LEFT  09/17/2019     IR EXTREMITY ANGIOGRAM LEFT  09/17/2019     OOPHORECTOMY  2002     ZZC APPENDECTOMY  1984    Description: Appendectomy;  Recorded: 01/08/2010;     ZZC PART REMOVAL COLON W ANASTOMOSIS      Description: Partial Colectomy;  Recorded: 03/11/2014;       ALLERGIES:  Patient has no known allergies.    MEDS:    Current Outpatient Medications:      Ascorbic Acid (VITAMIN C PO), Take 500 mg by mouth daily, Disp: , Rfl:      aspirin 81 MG EC tablet, [ASPIRIN 81 MG EC TABLET] Take 81 mg by mouth daily., Disp: , Rfl:      buPROPion (WELLBUTRIN XL) 150 MG 24 hr tablet, Take 1 tablet (150 mg) by mouth every morning, Disp: 90 tablet, Rfl: 1     calcium carbonate (OS-KATELYN) 1500 (600 Ca) MG tablet, Take 1,200 mg by mouth daily, Disp: , Rfl:      cholecalciferol, vitamin D3, (D3-50 CHOLECALCIFEROL) 50,000 unit capsule, [CHOLECALCIFEROL, VITAMIN D3, (D3-50 CHOLECALCIFEROL) 50,000 UNIT CAPSULE] Take 2,000 Units by mouth daily.       , Disp: , Rfl:      cilostazol (PLETAL) 100 MG tablet, Take 1 tablet (100 mg) by mouth 2 times daily, Disp: 180 tablet, Rfl: 1     clobetasol (TEMOVATE) 0.05 % external ointment, Apply topically 2 times daily To palms until resolved and use  sparingly (Patient not taking: Reported on 9/20/2022), Disp: 60 g, Rfl: 1     co-enzyme Q-10 30 mg capsule, [CO-ENZYME Q-10 30 MG CAPSULE] Take 200 mg by mouth daily.       , Disp: , Rfl:      ibuprofen (ADVIL,MOTRIN) 200 MG tablet, [IBUPROFEN (ADVIL,MOTRIN) 200 MG TABLET] Take 200 mg by mouth every 6 (six) hours as needed for pain., Disp: , Rfl:      rosuvastatin (CRESTOR) 20 MG tablet, Take 1 tablet (20 mg) by mouth daily, Disp: 90 tablet, Rfl: 0     VITAMIN E PO, Take 1 tablet by mouth daily 400 units, Disp: , Rfl:     SOCIAL HABITS:    History   Smoking Status     Former     Packs/day: 1.00     Years: 50.00     Types: Cigarettes     Quit date: 5/17/2022   Smokeless Tobacco     Never     Social History    Substance and Sexual Activity      Alcohol use: Yes        Alcohol/week: 2.0 standard drinks      History   Drug Use No       FAMILY HISTORY:    Family History   Problem Relation Age of Onset     Atrial fibrillation Mother      Thyroid Disease Mother      Alzheimer Disease Mother      Myocardial Infarction Mother      Cancer Father      Myocardial Infarction Father      Heart Disease Sister      Heart Disease Sister      Cancer Sister         Rectal 63      Clotting Disorder Sister      Cancer Brother         started in kidny - metastasis     Heart Disease Brother      Ovarian Cancer Maternal Aunt      Cancer Maternal Aunt         lung     Cancer Maternal Aunt         lung     Hereditary Breast and Ovarian Cancer Syndrome No family hx of      Breast Cancer No family hx of      Endometrial Cancer No family hx of        REVIEW OF SYSTEMS:    A 12 point ROS was reviewed and except for what is listed in the HPI above, all others are negative    PE:  /80   Pulse 80   Temp 97.4  F (36.3  C) (Temporal)   Resp 16   Wt Readings from Last 1 Encounters:   09/20/22 160 lb 6.4 oz (72.8 kg)     There is no height or weight on file to calculate BMI.    EXAM:  GENERAL: This is a well-developed 67 year old female who  appears her stated age  EYES: Grossly normal.  MOUTH: Buccal mucosa normal   MUSCULOSKELETAL: Grossly normal and both lower extremities are intact.  HEME/LYMPH: No lymphedema  NEUROLOGIC: Focally intact, Alert and oriented x 3.   PSYCH: appropriate affect  INTEGUMENT: No open lesions or ulcers        DIAGNOSTIC STUDIES:     Images:  US L Ext Arterial Doppler Seg Pres w Exer Percy    Result Date: 12/19/2022  Table formatting from the original result was not included. BILATERAL Ankle Brachial Index with Exercise (Date: 12/19/22) Indication: SURVEILLANCE NANCY'S: PAD. LEFT LEG PAIN. HX: IR LEFT SUPERFICIAL FEMORAL ARTERY DISTAL BALLOON ANGIOGRAM DONE 9/17/2020. Previous: 7/20/2020. History: Previous Smoker, Hypertension, Hyperlipidemia, PAD, CAD, Angioplasty, Rest Pain and Claudication Resting NANCY's          Right: mmHg Index     Brachial: 181  Ankle-(PT): 191 1.06 Ankle-(DP): 182 1.01          Digit: 131 0.72               Left: mmHg Index     Brachial: 174  Ankle-(PT): 145 0.80 Ankle-(DP): 128 0.71          Digit: 106 0.59 Resting ankle-brachial index of 1.06 on the right. Toe Pressures of 131 mmHg and TBI of 0.72 Resting ankle-brachial index of 0.80 on the left. Toe Pressures of 106 mmHg and TBI of 0.59  VPR WAVEFORMS: The right volume plethysmography waveforms are normal at the lower thigh level, normal at the upper calf level and normal at the ankle. The left volume plethysmography waveforms are mildly abnormal at the lower thigh level, mildly abnormal at the upper calf level and mildly abnormal at the ankle. Exercise Testing: MOBLEY KIM TESTING:  Time (min) Grade Level % Rate MPH Level of Pain (1-10) Area of Pain 1 0 2.0  0  NO PAIN  2 0 2.0  0   NO PAIN  3 2 2.0  0  NO PAIN  4 2 2.0  2  LEFT CALF PAIN  5 4 2.0  2  LEFT CALF PAIN  6 4 2.0  2  LEFT CALF PAIN  7 6 2.0  2  LEFT CALF PAIN  8 6 2.0 2  LEFT CALF PAIN  9 8 2.0  2  LEFT CALF PAIN  10 8 2.0  4  LEFT CALF PAIN  11 10 2.0  4  LEFT CALF PAIN  12 10 2.0  6   LEFT CALF PAIN / STOPPED  Post Exercise Pressures: Location: Rest 1 minute 3 minute 5 minute 7 minute 10 minute Right Ankle  180 173 163 153 167 Left Ankle  66 84 95 108 116 Right Brachial 181 170 159 162 145 162 Right NANCY: 1.06 1.06 1.09 1.01 1.06 1.03 Left NANCY: 0.80 0.39 0.53 0.59 0.74 0.72 Comments:  Impression: 1. RIGHT LOWER EXTREMITY: NANCY is Normal with multiphasic waveforms with an NANCY of 1.06. Toe Pressures are Normal and adequate for wound healing with toe pressures of 131 mmHg. 2. LEFT LOWER EXTREMITY: NANCY is Abnormal with an NANCY of 0.80 indicating single level disease. Toe Pressures are Normal and adequate for wound healing with toe pressures of 106 mmHg. resting ABIs are essentially unchanged from his prior study 3. MOBLEY KIM TESTING: Patient able to walk for a total of 12 minutes with pain starting at 4 minutes in the left calf.  Importantly this is a significant improvement from 7 minutes prior to his institution of a walking program. Significant drop in NANCY supports that the patient's symptoms may be consistent with vascular claudication. Reference: Wound classification Grade NANCY Ankle Systolic Pressure Toe Pressures 0 > 0.80 > 100 mmHg > 60 mmHg 1 0.6 - 0.79 70 - 100 mmHg 40 - 59 mmHg 2 0.4 - 0.59 50-70 mmHg 30 - 39 mmHg 3 < 0.39 < 50 mmHg < 30 mmHg Digit Pressures DBI Disease Category > 0.70 Normal < 0.70 Abnormal > 30 mmHg Potential wound healing < 30 mmHg Impaired wound healing Ankle Brachial Pressures NANCY Disease Category > 1.3  Likely vessel calcification with monophasic waveforms, non-diagnostic 0.95-1.30 Normal with multiphasic waveforms 0.50-0.95 Single level disease 0.30-0.50 Multilevel disease < 0.30 Critical limb ischema Volume Plethysmography Recording (VPR) at all levels Normal Sharp systolic peak, fast upstroke, prominent dicrotic notch in wave Mild Sharp systolic peak, fast upstroke, absent dicrotic notch in wave Moderate Flattened systolic peak, slowed upstroke,  absent dicrotic notch inwave Severe amplitude wave with = upslope and down slope Occluded Flat Line Post Exercise Stress Testing Normal Post Stress NANCY > resting NANYC, or Ankle systolic pressure falls < 20% below resting NANCY with return to baseline in 3 minutes Abnormal  Ankle systolic pressure falls > 20% below resting NANCY and /or without return to baseline within 3 minutes       I personally reviewed the images and my interpretation is that her Alcaraz Fong test demonstrates significant improvement from 7 minutes to 12 minutes and walking.  Clearly has left limb claudication range ABIs..    LABS:      Sodium   Date Value Ref Range Status   07/11/2022 138 136 - 145 mmol/L Final   05/16/2022 141 136 - 145 mmol/L Final   04/01/2022 141 136 - 145 mmol/L Final     Urea Nitrogen   Date Value Ref Range Status   07/11/2022 18 8 - 22 mg/dL Final   05/16/2022 15 8 - 22 mg/dL Final   04/01/2022 16 8 - 22 mg/dL Final     Hemoglobin   Date Value Ref Range Status   07/11/2022 13.1 11.7 - 15.7 g/dL Final   05/16/2022 13.7 11.7 - 15.7 g/dL Final   04/01/2022 14.6 11.7 - 15.7 g/dL Final     Platelet Count   Date Value Ref Range Status   07/11/2022 249 150 - 450 10e3/uL Final   05/16/2022 272 150 - 450 10e3/uL Final   04/01/2022 287 150 - 450 10e3/uL Final     INR   Date Value Ref Range Status   09/17/2019 0.84 (L) 0.90 - 1.10 Final       Yuki Ponce MD, MD  VASCULAR SURGERY

## 2022-12-19 NOTE — PROGRESS NOTES
[facility]        Patient is here for a  follow up  to discuss left leg claudication    US BEFORE- 4 month f/u. L leg claudication. Pt is doing walking program. until 1/16. Thinks walking program is helping. Can go longer distances without discomfort in left calf.    Pt is currently taking Aspirin and Statin, Pletal    /80   Pulse 80   Temp 97.4  F (36.3  C) (Temporal)   Resp 16     The provider has been notified that the patient has no concerns.     Questions patient would like addressed today are: N/A.    Refills are needed: No    Has homecare services and agency name:  Lidia Arenas RN

## 2022-12-19 NOTE — PATIENT INSTRUCTIONS
Ankle-Brachial Index (NANCY) or Physiologic Test    Description  An ankle-brachial index test is relatively pain free. Blood pressure cuffs of various sizes are placed on your thigh, calf, foot and toes.  Similar to having your blood pressure checked with an arm cuff, as the technician inflates the cuffs, they progressively tighten and are then quickly released.  You may feel some discomfort, but generally for less than 60 seconds for each measurement. You will be asked to remove your socks and shoes and possibly your pants or shorts. Gowns will be provided. It usually takes about 30-60 minutes.   Depending on the initial readings and patient symptoms, you may be asked to perform a light walk on a treadmill.  The technician will apply ultrasound gel, usually warmed for your comfort, to your ankles and wrists. Through the gel, the technician will use a small hand-held device that emits sound waves.  Risks  There are typically no side effects or complications associated with a physiologic study.  How to Prepare  Eat and take medications as usual.  There is no preparation required for an ankle-brachial index (NANCY) or physiologic exam.  What Can I Expect After the Test?  The technician will send the ultrasound images to your vascular surgeon for evaluation. Typically, a report is available in 2-3 days. If anything critical is found, it is standard practice to notify the vascular surgeon immediately.  Reference: https://vascular.org/patient-resources/vascular-tests

## 2022-12-20 ENCOUNTER — TELEPHONE (OUTPATIENT)
Dept: CARDIOLOGY | Facility: CLINIC | Age: 67
End: 2022-12-20

## 2022-12-20 NOTE — TELEPHONE ENCOUNTER
Called patient to review recent elevated blood pressure reading.  Per [place] Community [place] guidelines, patients blood pressure is out of parameters and recheck blood pressure is recommended.    Last Blood Pressure: 142/70  Last Heart Rate: 95  Date:11/21/22  Location: Home [facility]    Today's Blood Pressure: 138/80  Today's Heart Rate: 80  Date:12/19/22  Location: Other Specialty    Patient reported blood pressure updated in Epic. Blood pressure falls within [place] Community Me[place] guidelines.  Patient will follow up as previously advised.   Francesca Oneil MA

## 2022-12-21 ENCOUNTER — HOSPITAL ENCOUNTER (OUTPATIENT)
Dept: CARDIAC REHAB | Facility: HOSPITAL | Age: 67
Discharge: HOME | End: 2022-12-21
Attending: INTERNAL MEDICINE
Payer: COMMERCIAL

## 2022-12-21 PROCEDURE — 93668 PERIPHERAL VASCULAR REHAB: CPT

## 2023-01-09 DIAGNOSIS — F43.20 ADJUSTMENT DISORDER, UNSPECIFIED TYPE: ICD-10-CM

## 2023-01-11 RX ORDER — BUPROPION HYDROCHLORIDE 150 MG/1
150 TABLET ORAL EVERY MORNING
Qty: 90 TABLET | Refills: 0 | Status: SHIPPED | OUTPATIENT
Start: 2023-01-11 | End: 2023-04-03

## 2023-01-11 NOTE — TELEPHONE ENCOUNTER
"Former patient of Jus & has not established care with another provider.  Please assign refill request to covering provider per clinic standard process.    Routing refill request to provider for review/approval because:  Patient needs to be seen because it has been more than 1 year since last office visit.  No PCP    Last Written Prescription Date:  7/18/22  Last Fill Quantity: 90,  # refills: 1   Last office visit provider:  6/28/22     Requested Prescriptions   Pending Prescriptions Disp Refills     buPROPion (WELLBUTRIN XL) 150 MG 24 hr tablet 90 tablet 1     Sig: Take 1 tablet (150 mg) by mouth every morning       SSRIs Protocol Failed - 1/11/2023 10:37 AM        Failed - Recent (12 mo) or future (30 days) visit within the authorizing provider's specialty     Patient has had an office visit with the authorizing provider or a provider within the authorizing providers department within the previous 12 mos or has a future within next 30 days. See \"Patient Info\" tab in inbasket, or \"Choose Columns\" in Meds & Orders section of the refill encounter.              Passed - Medication is Bupropion     If the medication is Bupropion (Wellbutrin), and the patient is taking for smoking cessation; OK to refill.          Passed - Medication is active on med list        Passed - Patient is age 18 or older        Passed - No active pregnancy on record        Passed - No positive pregnancy test in last 12 months             Manjeet Guadalupe RN 01/11/23 10:37 AM  "

## 2023-03-05 NOTE — TELEPHONE ENCOUNTER
Pt called to confirm if he needed to start taking Plavix 3 days prior to her angiogram on 9/17/19. Rx was sent for 90 days, informed her to continue after the angiogram.     Angiogram instructions were discussed. 630am arrival at [facility]. NPO after midnight. She will need a . F/U's scheduled.    External [place]/[facility]

## 2023-03-08 NOTE — DISCHARGE INSTRUCTIONS
Interventional Cardiology  Coronary Angiogram/Angioplasty/Stent/Atherectomy Discharge  Instructions -   Radial (wrist) Approach     The instructions below are to help you understand how to take care of yourself. There is also information about when to call the doctor or emergency services.    **Do not stop your aspirin or platelet inhibitor unless directed by your Cardiologist.  These medications help to prevent platelets in your blood from sticking together and forming a clot.   Examples of these medications are: Ticagrelor (Brilinta), Clopidogrel (Plavix), Prasugrel (Effient)    For 24 hours after procedure:  Do not subject hand/arm to any forceful movements for 24 hours, such as supporting weight when rising from a chair or bed.  Do not drive a car for 24 hours.  The dressing on the puncture site may be removed after 24 hours and left open to air. If minor oozing, you may apply a Band-Aid and remove after 12 hours.   You may shower on the day after your procedure. Do not take a tub bath or wash dishes (no soaking wrist) with the puncture site in water for 3 days after the procedure.    For 48 hours following the procedure:  Do not operate a lawnmower, motorcycle, chainsaw or all-terrain vehicle.  Do not lift anything heavier than 5-10 pounds with affected arm for 5 days.  Avoid excessive bending (flexion/extension) wrist movement.  Do not engage in vigorous exercise (i.e. tennis, golf) using the affected arm for 5 days after discharge.  You may return to work in 72 hours if no complications and no heavy lifting.    If bleeding should occur following discharge:  Sit down and apply firm pressure with your thumb against the puncture site and fingers against back of wrist for 10 minutes.  If the bleeding stops, continue to rest, keeping your wrist still for 2 hours. Notify your doctor as soon as possible.  If bleeding does not stop after 10 minutes or if there is a large amount of bleeding or spurting, call 911  Patient is status post right hip surgery June 2022.  Patient was diagnosed with femoral acetabular impingement syndrome.  Patient followed by orthopedic specialist.  Patient takes oxycodone as needed.  Patient has done physical therapy.   immediately. Do not drive yourself to the hospital.           Contact the Heart Clinic at [phone] if you develop:  Fever over 100.4, that lasts more than one day  Redness, heat, or pus at the puncture site  Change in color or temperature of the hand or arm    Expect mild tingling of hand and tenderness at the puncture site for up to 3 days. You may take Tylenol or a pain medicine recommended by your doctor.                       Our Cardiac Rehab staff may visit briefly with you while your in the hospital.   If they miss you, someone will contact you after you are home.  You are encouraged to enroll in an Outpatient Cardiac Rehab Program     No elective dental work for 6 weeks after having a stent    Your Procedural Physician was: Dr. Britney Barriga  the phone number is: [phone]    [facility]:  [phone]  If you are calling after hours, please listen to the entire voicemail, a live  will answer at the end of the message

## 2023-04-01 DIAGNOSIS — F43.20 ADJUSTMENT DISORDER, UNSPECIFIED TYPE: ICD-10-CM

## 2023-04-02 NOTE — TELEPHONE ENCOUNTER
"Routing refill request to provider for review/approval because:  Patient needs to be seen     Last Written Prescription Date:  1/11/2023  Last Fill Quantity: 90,  # refills: 0   Last office visit provider:  6/28/2022     Requested Prescriptions   Pending Prescriptions Disp Refills     buPROPion (WELLBUTRIN XL) 150 MG 24 hr tablet [Pharmacy Med Name: BUPROPION XL 150MG TABLETS (24 H)] 90 tablet 0     Sig: TAKE 1 TABLET(150 MG) BY MOUTH EVERY MORNING       SSRIs Protocol Failed - 4/1/2023  2:09 PM        Failed - Recent (12 mo) or future (30 days) visit within the authorizing provider's specialty     Patient has had an office visit with the authorizing provider or a provider within the authorizing providers department within the previous 12 mos or has a future within next 30 days. See \"Patient Info\" tab in inbasket, or \"Choose Columns\" in Meds & Orders section of the refill encounter.              Passed - Medication is Bupropion     If the medication is Bupropion (Wellbutrin), and the patient is taking for smoking cessation; OK to refill.          Passed - Medication is active on med list        Passed - Patient is age 18 or older        Passed - No active pregnancy on record        Passed - No positive pregnancy test in last 12 months             Debra Rosales RN 04/02/23 2:48 AM  "

## 2023-04-03 RX ORDER — BUPROPION HYDROCHLORIDE 150 MG/1
TABLET ORAL
Qty: 90 TABLET | Refills: 0 | Status: SHIPPED | OUTPATIENT
Start: 2023-04-03

## 2023-04-03 NOTE — TELEPHONE ENCOUNTER
04/03 lm on voicemail that we did refill medication one time if seeing Dr. Luu at new location please call pharmacy so they can straighten that out, if not needs to call and schedule with a provider who is taking new pt's before further refills can be authorized

## 2023-04-16 ENCOUNTER — HEALTH MAINTENANCE LETTER (OUTPATIENT)
Age: 68
End: 2023-04-16

## 2023-04-30 DIAGNOSIS — I73.9 PERIPHERAL VASCULAR DISEASE (H): ICD-10-CM

## 2023-05-01 RX ORDER — CILOSTAZOL 100 MG/1
TABLET ORAL
Qty: 180 TABLET | Refills: 1 | Status: SHIPPED | OUTPATIENT
Start: 2023-05-01 | End: 2023-10-24

## 2023-06-08 ENCOUNTER — LAB REQUISITION (OUTPATIENT)
Dept: LAB | Facility: CLINIC | Age: 68
End: 2023-06-08
Payer: COMMERCIAL

## 2023-06-08 ENCOUNTER — TRANSFERRED RECORDS (OUTPATIENT)
Dept: HEALTH INFORMATION MANAGEMENT | Facility: CLINIC | Age: 68
End: 2023-06-08

## 2023-06-08 DIAGNOSIS — Z00.00 ENCOUNTER FOR GENERAL ADULT MEDICAL EXAMINATION WITHOUT ABNORMAL FINDINGS: ICD-10-CM

## 2023-06-08 PROCEDURE — 87086 URINE CULTURE/COLONY COUNT: CPT | Mod: ORL | Performed by: FAMILY MEDICINE

## 2023-06-11 LAB — BACTERIA UR CULT: NO GROWTH

## 2023-06-19 ENCOUNTER — TELEPHONE (OUTPATIENT)
Dept: CARDIOLOGY | Facility: CLINIC | Age: 68
End: 2023-06-19
Payer: COMMERCIAL

## 2023-06-19 NOTE — TELEPHONE ENCOUNTER
[facility] Call Center    Phone Message    May a detailed message be left on voicemail: yes     Reason for Call: Other: Bridging orders 2 day hold pletal medication prior to ROBYN with colonoscopy on 7/21/2023 if the hold cannot be done please follow up [facility] [phone] detailed messages can be left.     Action Taken: Other: cardio    Travel Screening: Not Applicable   Thank you!  [facility]

## 2023-06-20 NOTE — TELEPHONE ENCOUNTER
----- Message -----  From: Esteban Benson MD  Sent: 6/20/2023  11:10 AM CDT  To: Candi Gillespie RN    Agree with Pletal hold for 2 days.  Are her legs doing better with this medication without shortness of breath in any event?  LF            ==called and left a detailed message on [facility] support line outlining above. Clinic number provided for call back. -[facility] – [place], [place]

## 2023-06-20 NOTE — TELEPHONE ENCOUNTER
Noted.     LALI Curran requesting Pletal 2 day hold prior to colonoscopy on 7/21. OK to hold?  Thanks,  Mal

## 2023-06-21 ENCOUNTER — LAB REQUISITION (OUTPATIENT)
Dept: LAB | Facility: CLINIC | Age: 68
End: 2023-06-21
Payer: COMMERCIAL

## 2023-06-21 ENCOUNTER — TRANSFERRED RECORDS (OUTPATIENT)
Dept: HEALTH INFORMATION MANAGEMENT | Facility: CLINIC | Age: 68
End: 2023-06-21

## 2023-06-21 DIAGNOSIS — Z13.220 ENCOUNTER FOR SCREENING FOR LIPOID DISORDERS: ICD-10-CM

## 2023-06-21 DIAGNOSIS — Z13.1 ENCOUNTER FOR SCREENING FOR DIABETES MELLITUS: ICD-10-CM

## 2023-06-21 DIAGNOSIS — I25.10 ATHEROSCLEROTIC HEART DISEASE OF NATIVE CORONARY ARTERY WITHOUT ANGINA PECTORIS: ICD-10-CM

## 2023-06-21 DIAGNOSIS — Z13.21 ENCOUNTER FOR SCREENING FOR NUTRITIONAL DISORDER: ICD-10-CM

## 2023-06-21 LAB
ALBUMIN SERPL BCG-MCNC: 4.7 G/DL (ref 3.5–5.2)
ALP SERPL-CCNC: 75 U/L (ref 35–104)
ALT SERPL W P-5'-P-CCNC: 31 U/L (ref 0–50)
ANION GAP SERPL CALCULATED.3IONS-SCNC: 11 MMOL/L (ref 7–15)
AST SERPL W P-5'-P-CCNC: 27 U/L (ref 0–45)
BILIRUB SERPL-MCNC: 0.2 MG/DL
BUN SERPL-MCNC: 15 MG/DL (ref 8–23)
CALCIUM SERPL-MCNC: 10.3 MG/DL (ref 8.8–10.2)
CHLORIDE SERPL-SCNC: 104 MMOL/L (ref 98–107)
CHOLEST SERPL-MCNC: 138 MG/DL
CREAT SERPL-MCNC: 1.12 MG/DL (ref 0.51–0.95)
DEPRECATED CALCIDIOL+CALCIFEROL SERPL-MC: 65 UG/L (ref 20–75)
DEPRECATED HCO3 PLAS-SCNC: 26 MMOL/L (ref 22–29)
GFR SERPL CREATININE-BSD FRML MDRD: 54 ML/MIN/1.73M2
GLUCOSE SERPL-MCNC: 115 MG/DL (ref 70–99)
HDLC SERPL-MCNC: 62 MG/DL
LDLC SERPL CALC-MCNC: 62 MG/DL
NONHDLC SERPL-MCNC: 76 MG/DL
POTASSIUM SERPL-SCNC: 5.4 MMOL/L (ref 3.4–5.3)
PROT SERPL-MCNC: 7.1 G/DL (ref 6.4–8.3)
SODIUM SERPL-SCNC: 141 MMOL/L (ref 136–145)
TRIGL SERPL-MCNC: 72 MG/DL
TSH SERPL DL<=0.005 MIU/L-ACNC: 3 UIU/ML (ref 0.3–4.2)

## 2023-06-21 PROCEDURE — 82306 VITAMIN D 25 HYDROXY: CPT | Mod: ORL | Performed by: FAMILY MEDICINE

## 2023-06-21 PROCEDURE — 84443 ASSAY THYROID STIM HORMONE: CPT | Mod: ORL | Performed by: FAMILY MEDICINE

## 2023-06-21 PROCEDURE — 80061 LIPID PANEL: CPT | Mod: ORL | Performed by: FAMILY MEDICINE

## 2023-06-21 PROCEDURE — 80053 COMPREHEN METABOLIC PANEL: CPT | Mod: ORL | Performed by: FAMILY MEDICINE

## 2023-06-30 DIAGNOSIS — F43.20 ADJUSTMENT DISORDER, UNSPECIFIED TYPE: ICD-10-CM

## 2023-06-30 RX ORDER — BUPROPION HYDROCHLORIDE 150 MG/1
TABLET ORAL
Qty: 90 TABLET | Refills: 0 | OUTPATIENT
Start: 2023-06-30

## 2023-06-30 NOTE — TELEPHONE ENCOUNTER
"Routing refill request to provider for review/approval because:  Patient needs to be seen because it has been more than 1 year since last office visit.    Last Written Prescription Date:  4/3/23  Last Fill Quantity: 90,  # refills: 0   Last office visit provider:  6/28/22     Requested Prescriptions   Pending Prescriptions Disp Refills     buPROPion (WELLBUTRIN XL) 150 MG 24 hr tablet [Pharmacy Med Name: BUPROPION XL 150MG TABLETS (24 H)] 90 tablet 0     Sig: TAKE 1 TABLET(150 MG) BY MOUTH EVERY MORNING       SSRIs Protocol Failed - 6/30/2023  3:24 PM        Failed - Recent (12 mo) or future (30 days) visit within the authorizing provider's specialty     Patient has had an office visit with the authorizing provider or a provider within the authorizing providers department within the previous 12 mos or has a future within next 30 days. See \"Patient Info\" tab in inbasket, or \"Choose Columns\" in Meds & Orders section of the refill encounter.              Passed - Medication is Bupropion     If the medication is Bupropion (Wellbutrin), and the patient is taking for smoking cessation; OK to refill.          Passed - Medication is active on med list        Passed - Patient is age 18 or older        Passed - No active pregnancy on record        Passed - No positive pregnancy test in last 12 months             Manjeet Guadalupe RN 06/30/23 3:24 PM  "

## 2023-06-30 NOTE — TELEPHONE ENCOUNTER
Declined    Refill note from 4/3/23:    04/03 lm on voicemail that we did refill medication one time if seeing Dr. Luu at new location please call pharmacy so they can straighten that out, if not needs to call and schedule with a provider who is taking new pt's before further refills can be authorized

## 2023-07-21 ENCOUNTER — TRANSFERRED RECORDS (OUTPATIENT)
Dept: HEALTH INFORMATION MANAGEMENT | Facility: CLINIC | Age: 68
End: 2023-07-21

## 2023-10-04 ENCOUNTER — TELEPHONE (OUTPATIENT)
Dept: VASCULAR SURGERY | Facility: CLINIC | Age: 68
End: 2023-10-04
Payer: COMMERCIAL

## 2023-10-24 DIAGNOSIS — I73.9 PERIPHERAL VASCULAR DISEASE (H): ICD-10-CM

## 2023-10-24 RX ORDER — CILOSTAZOL 100 MG/1
TABLET ORAL
Qty: 60 TABLET | Refills: 0 | Status: SHIPPED | OUTPATIENT
Start: 2023-10-24 | End: 2023-10-26

## 2023-10-26 DIAGNOSIS — I73.9 PERIPHERAL VASCULAR DISEASE (H): ICD-10-CM

## 2023-10-26 RX ORDER — CILOSTAZOL 100 MG/1
100 TABLET ORAL 2 TIMES DAILY
Qty: 180 TABLET | Refills: 0 | Status: SHIPPED | OUTPATIENT
Start: 2023-10-26 | End: 2024-02-02

## 2023-12-06 ENCOUNTER — HOSPITAL ENCOUNTER (EMERGENCY)
Facility: HOSPITAL | Age: 68
Discharge: HOME | End: 2023-12-06
Payer: COMMERCIAL

## 2023-12-06 VITALS
DIASTOLIC BLOOD PRESSURE: 93 MMHG | HEART RATE: 94 BPM | RESPIRATION RATE: 16 BRPM | SYSTOLIC BLOOD PRESSURE: 190 MMHG | TEMPERATURE: 98.1 F | OXYGEN SATURATION: 96 %

## 2023-12-06 DIAGNOSIS — S61.216A LACERATION OF RIGHT LITTLE FINGER WITHOUT FOREIGN BODY WITHOUT DAMAGE TO NAIL, INITIAL ENCOUNTER: ICD-10-CM

## 2023-12-06 PROCEDURE — 12001 RPR S/N/AX/GEN/TRNK 2.5CM/<: CPT

## 2023-12-06 PROCEDURE — 99283 EMERGENCY DEPT VISIT LOW MDM: CPT

## 2023-12-06 RX ORDER — GINSENG 100 MG
CAPSULE ORAL ONCE
Status: DISCONTINUED | OUTPATIENT
Start: 2023-12-06 | End: 2023-12-06 | Stop reason: HOSPADM

## 2023-12-06 ASSESSMENT — ACTIVITIES OF DAILY LIVING (ADL): ADLS_ACUITY_SCORE: 33

## 2023-12-06 NOTE — ED PROVIDER NOTES
Emergency Department Encounter  [facility] EMERGENCY DEPARTMENT  Amirah Oakley   AGE: 68 year old SEX: female  YOB: 1955  MRN: [id number]      EVALUATION DATE & TIME: 12/6/2023  4:07 PM ; PCP: No primary care provider on file.   ED PROVIDER: Nai Parekh PA-C, Marcy Zamora PA-C    CHIEF COMPLAINT: Hand Pain and Laceration      MEDICAL DECISION MAKING   Amirah Oakley is a left handed 68 year old female with hypertension, peripheral artery disease, and hyperlipidemia presenting to the ED to the emergency department for evaluation of a finger laceration.  Patient was pushing a bag into her recycling can 1.5 hours prior to arrival to the emergency department when she sliced the tip of her right little finger on the sharp corner of a can.  Patient initially stated that she is on a blood thinner but this is incorrect, patient currently takes cilostazol which is a vasodilator that relaxes blood vessel muscles.    Vitals reviewed and are dynamically stable, initially hypertensive but patient has a history of hypertension. On exam, wound was inspected under direct bright light with good visualization. There is an area with linear laceration across soft tissue through adipose without exposure of muscle belly or tendon on the right fifth finger. No overt foreign body. Area hemostatic after application of sterile gauze soaked in lidocaine with epinephrine for 30 minutes. Neurovascular exam congruent with above.     Medical record shows last tetanus 4/2022.  A digital block was then performed on the fifth finger of the right hand and the laceration was repaired with sutures.  Laceration nears the medial aspect of the nailbed but the nail was sufficiently intact within the nail folds making this a close nailbed injury which does not require nail removal and nailbed repair. Affected area was extensively irrigated with tap water. Laceration was repaired in  simple fashion as below (please see procedure note for further details). Patient tolerated procedure well and neurovascular exam intact and unchanged post repair with intact distal pulses and cap refill.     Cautious return precautions discussed w/ full understanding. Wound care discussed.  I recommended prompt follow up with primary care physician or urgent care for suture removal in 10-14 days.     History:  Supplemental history from: Documented in chart, if applicable  External Record(s) reviewed: Documented in chart, if applicable. and Other: immunization records    Work Up:  Chart documentation includes differential considered and any EKGs or imaging independently interpreted by provider, where specified.  In additional to work up documented, I considered the following work up: Documented in chart, if applicable.    External consultation:  Discussion of management with another provider: Documented in chart, if applicable    Complicating factors:  Care impacted by chronic illness: Hyperlipidemia, Hypertension, and Other: peripheral artery disease  Care affected by social determinants of health: N/A    Disposition considerations: Discharge. No recommendations on prescription strength medication(s). See documentation for any additional details.    FINAL IMPRESSION       ICD-10-CM    1. Laceration of right little finger without foreign body without damage to nail, initial encounter  S61.216A           ED COURSE   4:30 PM I met and introduced myself to the patient. I gathered initial history and performed my physical exam. We discussed plan for initial workup.   4:45 PM I have staffed the patient with Marcy Zamora ED, PA-C, who has evaluated the patient and agrees with all aspects of today's care.   5:00 PM I rechecked the patient and discussed results, discharge, follow up, and reasons to return to the ED.     MEDICATIONS GIVEN IN THE EMERGENCY DEPARTMENT:   Medications   bacitracin ointment (has no  administration in time range)      NEW PRESCRIPTIONS STARTED AT TODAY'S ED VISIT:  Discharge Medication List as of 12/6/2023  5:16 PM              =================================================================         HISTORY OF PRESENT ILLNESS   Patient information was obtained from: patient   Use of Intrepreter: N/A     Amirah SSOA J Carlos Oakley is a 68 year old female with hypertension, peripheral artery disease, and hyperlipidemia presenting to the ED to the emergency department by private vehicle for evaluation of a finger laceration.  Patient was pushing a bag into her recycling can 1.5 hours prior to arrival to the emergency department when she sliced the tip of her right little finger on the sharp corner of a can.  Patient presents to the emergency department because bleeding did not stop after an hour.  Patient shares that she is on a blood thinner cilostazol, which is a vasodilator that relaxes blood vessels.  Patient is unsure when her last tetanus status.  She denies any additional trauma and did not fall or hit her head.    MEDICAL HISTORY     Past Medical History:   Diagnosis Date    Acute bronchitis with bronchospasm 11/28/2015    Patellar Chondromalacia     Tibia/fibula fracture 11/27/2015    Upper Back Pain (Between Shoulder Blades)        Past Surgical History:   Procedure Laterality Date    CV CORONARY ANGIOGRAM N/A 7/11/2022    Procedure: Coronary Angiogram;  Surgeon: Britney Barriga MD;  Location: [facility] LEFT HEART CATH N/A 7/11/2022    Procedure: Left Heart Catheterization;  Surgeon: Britney Barriga MD;  Location: [facility]    HC OPEN RX WEIGHTBEAR TIB,FIX TIB ONLY Left 11/27/2015    Procedure: OPEN REDUCTION INTERNAL FIXATION LEFT TIBIAL FIBULAR FRACTURE;  Surgeon: Roberto Zepeda MD;  Location: [facility] OR;  Service: Orthopedics    HC REMOVAL GALLBLADDER  1984    Description: Cholecystectomy;  Recorded: 01/08/2010;    HYSTERECTOMY  2002    IR  EXTREMITY ANGIOGRAM LEFT  2019    IR EXTREMITY ANGIOGRAM LEFT  2019    OOPHORECTOMY      [facility] APPENDECTOMY  1984    Description: Appendectomy;  Recorded: 2010;    [facility] PART REMOVAL COLON W ANASTOMOSIS      Description: Partial Colectomy;  Recorded: 2014;       Family History   Problem Relation Age of Onset    Atrial fibrillation Mother     Thyroid Disease Mother     Alzheimer Disease Mother     Myocardial Infarction Mother     Cancer Father     Myocardial Infarction Father     Heart Disease Sister     Heart Disease Sister     Cancer Sister         Rectal 63     Clotting Disorder Sister     Cancer Brother         started in kidny - metastasis    Heart Disease Brother     Ovarian Cancer Maternal Aunt     Cancer Maternal Aunt         lung    Cancer Maternal Aunt         lung    Hereditary Breast and Ovarian Cancer Syndrome No family hx of     Breast Cancer No family hx of     Endometrial Cancer No family hx of        Social History     Tobacco Use    Smoking status: Former     Packs/day: 1.00     Years: 50.00     Additional pack years: 0.00     Total pack years: 50.00     Types: Cigarettes     Quit date: 2022     Years since quittin.5    Smokeless tobacco: Never   Substance Use Topics    Alcohol use: Yes     Alcohol/week: 2.0 standard drinks of alcohol    Drug use: No       Ascorbic Acid (VITAMIN C PO)  aspirin 81 MG EC tablet  buPROPion (WELLBUTRIN XL) 150 MG 24 hr tablet  calcium carbonate (OS-KATELYN) 1500 (600 Ca) MG tablet  cholecalciferol, vitamin D3, (D3-50 CHOLECALCIFEROL) 50,000 unit capsule  cilostazol (PLETAL) 100 MG tablet  clobetasol (TEMOVATE) 0.05 % external ointment  co-enzyme Q-10 30 mg capsule  ibuprofen (ADVIL,MOTRIN) 200 MG tablet  rosuvastatin (CRESTOR) 20 MG tablet  VITAMIN E PO        PHYSICAL EXAM   VITALS:  Patient Vitals for the past 24 hrs:   BP Temp Temp src Pulse Resp SpO2   23 1602 (!) 190/93 98.1  F (36.7  C) Oral 94 16 96 %         CONSTITUTIONAL:  Generally well-appearing female , in no acute distress. Well developed, nourished.  EYES: Conjunctivae clear, no scleral icterus,  EOM intact.  HENT:  Normocephalic, atraumatic. Normal neck range of motion, supple. Moist mucous membranes.   RESPIRATORY:  Respirations even, unlabored, in no acute respiratory distress.  CARDIOVASCULAR:  Regular rate and rhythm, good peripheral perfusion.  GI: Soft, nondistended, nontender. No palpable masses. No rebound tenderness or guarding.   MSK:  No edema. No cyanosis. Range of motion of major extremities intact.   INTEGUMENT: Warm, dry, No erythema. No rash.  1.5 cm laceration to the right fifth finger tip.  No nail involvement.  NEURO: AxOx4. CN II-XII grossly intact, but not individually tested. No focal neurological deficits.   PSYCH: Thoughts linear and responses appropriate. Cooperative. Appropriate mood and affect.     LABS AND IMAGING   All pertinent labs reviewed and interpreted  Labs Ordered and Resulted from Time of ED Arrival to Time of ED Departure - No data to display    No orders to display       PROCEDURES     PROCEDURE: Laceration Repair   INDICATIONS: Laceration   PROCEDURE PROVIDER: Nai Parekh PA-C   SITE: Right little finger (5th finger)   TYPE/SIZE: simple, superficial, clean, and no foreign body visualized  1 cm (total length)   FUNCTIONAL ASSESSMENT: Distal sensation, circulation, motor, and tendon function intact   MEDICATION: 4 mLs of 2% Lidocaine without epinephrine   PREPARATION: irrigation with Warm soapy water   DEBRIDEMENT: wound explored, no foreign body found   CLOSURE:  Superficial layer closed with 3 stitches of 6-0 Prolene simple interrupted    Total number of sutures/staples placed: 3     Nai Parekh PA-C   Emergency Medicine   [facility] EMERGENCY DEPARTMENT  [address] [place] [id number] [phone]  Dept: [phone]      Marcy Zamora PA-C  12/09/23 0021

## 2023-12-06 NOTE — ED TRIAGE NOTES
The patient reports pushing a bag into her recycling can and something within the bag cut her right 5th finger. She is on blood thinners and has been unable to control the bleeding. There is a 0.5in cut to the tip of the 5th finger. It still bleeding. It has been wrapped.

## 2023-12-06 NOTE — DISCHARGE INSTRUCTIONS
A laceration, or cut, is an open wound through the skin.  These can be due to many different causes and can come in many different forms.  The depth, location, and cause of your wound, among multiple other factors (including your preference) affected the treatment choices we made today. Today I used used stitches to close the wound, stop the bleeding, and to help the wound heal with reduced scarring.    Aftercare instructions:    Keep the wound clean and dry for the first 12-24 hours. If we used a bandage, you can remove this bandage after 12-24 hours at your convenience.  After this, no further dressings are necessary, unless I advised you differently.  You can clean the area with a mild soap and water 2 times a day. Don t use hydrogen peroxide, iodine-based solutions, or alcohol, which can slow healing, and will probably be painful!  You may cover the wound with a thin layer of petroleum jelly, such as Vaseline. Continue to apply the petroleum jelly twice daily until the wound scars and dries out, or the sutures are removed or dissolve.  Sutures should be removed within 10-14 days.  Take good care of your wound at home to help it heal quickly and reduce your chance of infection. While your wound is healing, avoid any activity that could cause your wound to reopen.  Use common sense when it comes to activities.  Also, avoid unnescessary, copious bacteria exposure-for example, swimming in an ocean or hot tub, or wearing shoes or gloves over a wound for a long period of time (which promotes bacterial growth).    You may have some swelling, color changes, and bloody crusting on or around the wound for 2 or 3 days. This can be normal. Eventually, scar tissue will be form under the surface of the wound and your body will do the rest of the work of healing.        After your stitches come off:  It s very important to recognize that the most vulnerable time for a wound are the days right after the closure material has  been removed.  This is when a wound is most susceptible to dehiscence (opening up).  Thus, be careful to avoid activities that could put your wound under unneccesary and excessive tension forces.  Any wound that passes through the full thickness of the skin will cause a permanent scar.  This scar may be very prominent at first, but tends to gradually improve over the course of about a year. Protect your healing wound from excessive sunlight, which can darken the final appearance of a scar.  If a year has passed, and you are still not satisfied with the appearance of the scar, you can contact a plastic surgeon to discuss scar revision.      Call your doctor, or seek immediate medical care in an emergency department if:  Your wound is causing new pain, or the pain gets worse.  Some pain is normal with a wound, but do not ignore pain that is getting worse instead of better. You could have an infection.  The cut starts to bleed, and blood soaks through the bandage. Oozing small amounts of blood is normal.  The skin near the cut is cold or pale or changes color.  You have tingling, weakness, or numbness near the cut that we did not address during your visit.  You have trouble moving the area near the cut that we did not address during the visit.  You have increased pain, swelling, warmth, or redness around the cut, which could be a sign of infection.  There are red streaks leading from the cut, or there is pus draining from the cut.  You have a fever that you can t explain for another reason (like having a common cold).

## 2024-02-01 DIAGNOSIS — I73.9 PERIPHERAL VASCULAR DISEASE (H): ICD-10-CM

## 2024-02-02 RX ORDER — CILOSTAZOL 100 MG/1
100 TABLET ORAL 2 TIMES DAILY
Qty: 180 TABLET | Refills: 0 | Status: SHIPPED | OUTPATIENT
Start: 2024-02-02 | End: 2024-04-16

## 2024-02-07 ENCOUNTER — TELEPHONE (OUTPATIENT)
Dept: CARDIOLOGY | Facility: CLINIC | Age: 69
End: 2024-02-07

## 2024-02-07 ENCOUNTER — OFFICE VISIT (OUTPATIENT)
Dept: CARDIOLOGY | Facility: CLINIC | Age: 69
End: 2024-02-07
Payer: COMMERCIAL

## 2024-02-07 VITALS
SYSTOLIC BLOOD PRESSURE: 180 MMHG | BODY MASS INDEX: 27.46 KG/M2 | RESPIRATION RATE: 14 BRPM | DIASTOLIC BLOOD PRESSURE: 78 MMHG | HEART RATE: 86 BPM | WEIGHT: 160 LBS

## 2024-02-07 DIAGNOSIS — I25.83 CORONARY ARTERIOSCLEROSIS DUE TO LIPID RICH PLAQUE: Primary | ICD-10-CM

## 2024-02-07 DIAGNOSIS — I73.9 PERIPHERAL VASCULAR DISEASE (H): ICD-10-CM

## 2024-02-07 DIAGNOSIS — I10 BENIGN ESSENTIAL HYPERTENSION: ICD-10-CM

## 2024-02-07 DIAGNOSIS — F17.200 SMOKING ADDICTION: ICD-10-CM

## 2024-02-07 DIAGNOSIS — E78.00 PURE HYPERCHOLESTEROLEMIA: ICD-10-CM

## 2024-02-07 DIAGNOSIS — R00.2 PALPITATIONS: ICD-10-CM

## 2024-02-07 DIAGNOSIS — R06.09 DOE (DYSPNEA ON EXERTION): ICD-10-CM

## 2024-02-07 DIAGNOSIS — E66.811 OBESITY, CLASS I, BMI 30-34.9: ICD-10-CM

## 2024-02-07 DIAGNOSIS — I65.23 BILATERAL CAROTID ARTERY STENOSIS: ICD-10-CM

## 2024-02-07 DIAGNOSIS — I65.23 BILATERAL CAROTID ARTERY STENOSIS: Primary | ICD-10-CM

## 2024-02-07 PROCEDURE — 99214 OFFICE O/P EST MOD 30 MIN: CPT | Performed by: INTERNAL MEDICINE

## 2024-02-07 RX ORDER — AMLODIPINE BESYLATE 5 MG/1
5 TABLET ORAL DAILY
Status: DISCONTINUED | OUTPATIENT
Start: 2024-02-07 | End: 2024-02-08

## 2024-02-07 NOTE — LETTER
2/7/2024    Hector Luu MD  [address]  [place] [address]    RE: Amirah Oakley       Dear Colleague,     I had the pleasure of seeing Amirah Oakley in the [facility].      [facility]  Heart Care Clinic Follow-up Note    Assessment & Plan        (I25.10,  I25.83) Coronary arteriosclerosis due to lipid rich plaque  (primary encounter diagnosis)  Comment: Due to abnormal nuclear stress test angiography showed normal left main, LAD with distal 30 to 40% stenosis, normal nondominant circumflex and normal dominant right coronary artery.  Need to work on prevention.  Given increased shortness of breath we will obtain stress echo although doubt this is ischemia related.     (I73.9) Peripheral vascular disease (H)  Comment: Based on noninvasive testing left lower extremity has unremarkable common iliac, external iliac, common femoral but distal superficial femoral artery significant stenosis.  Patent popliteal and patent infrapopliteal vessels.  Right side has unremarkable common iliac, external iliac, common femoral with patent popliteal, posterior tibial and anterior tibial, and dorsalis pedis.  Her claudication has resolved when I started Pletal a year ago and she is still continuing this and doing well.    (I65.23) Bilateral carotid artery stenosis  Comment: Based on carotid ultrasound from 2019 she has a 50 to 70% stenosis of the left ICA as well as the right ICA.  She is due for repeat carotid ultrasound.      (F17.200) Smoking addiction  Comment: She has resumed smoking, she knows she needs to stop, she will work on it.     (E78.00) Pure hypercholesterolemia  Comment: Total cholesterol 138 with an LDL of 62, these numbers are excellent on rosuvastatin 20 mg.     (I10) Benign essential hypertension  Comment: Elevated today, has not come down on Pletal so we will add amlodipine 5 mg tablets, I have asked her started 2-1/2 for a week and go to 5 mg.  Gave  her 30-day supply of her works well she will call in for 90-day supply at her mail order.      (E66.9) Obesity, Class I, BMI 30-34.9  Comment: Continue to work on weight loss.    (R00.2) Palpitations  Comment: Suspect PACs or PVCs and I will have her wear mobile 2-week event monitor.    (R06.09) CRANDALL (dyspnea on exertion)  Comment: This could represent ischemia but doubt based on unremarkable angiogram almost 2 years ago.  I suspect this is most likely COPD and recommend stop smoking and be seen by lung doctors if all looks good.    Plan  1.  2-week mobile outpatient cardiac telemetry and address if abnormal.  2.  Stress echo and address if abnormal.  3.  Stop smoking.  4.  Work on weight loss.  5.  Arrange for carotid ultrasound.  6.  Start amlodipine, 2.5 mg a day for 8 days and go to 5 mg a day, she will then call us up for 90-day prescription.  7.  Follow-up with me 1 year or sooner if needed.    Subjective  CC:68-year-old white female here for yearly follow-up although has not seen me in almost 2 years.  She states she has had increased shortness of breath on activity, she is tired all the time.  She tells me she has a heart racing, when she is at home after a day at work she will feel her heart skipping beats.  There is no increased shortness of breath or lightheadedness with the heart skips.  She tells me she is working 2 days a week to 3 days a week part-time at [employer] in [place] as a  and doing fairly well with that.  There is no significant chest pains, PND, orthopnea, syncope.    Medications  Current Outpatient Medications   Medication Sig Dispense Refill    Ascorbic Acid (VITAMIN C PO) Take 500 mg by mouth daily      aspirin 81 MG EC tablet [ASPIRIN 81 MG EC TABLET] Take 81 mg by mouth daily.      buPROPion (WELLBUTRIN XL) 150 MG 24 hr tablet TAKE 1 TABLET(150 MG) BY MOUTH EVERY MORNING 90 tablet 0    calcium carbonate (OS-KATELYN) 1500 (600 Ca) MG tablet Take 1,200 mg by mouth daily       "cholecalciferol, vitamin D3, (D3-50 CHOLECALCIFEROL) 50,000 unit capsule [CHOLECALCIFEROL, VITAMIN D3, (D3-50 CHOLECALCIFEROL) 50,000 UNIT CAPSULE] Take 2,000 Units by mouth daily.             cilostazol (PLETAL) 100 MG tablet TAKE 1 TABLET TWICE A  tablet 0    co-enzyme Q-10 30 mg capsule [CO-ENZYME Q-10 30 MG CAPSULE] Take 200 mg by mouth daily.             rosuvastatin (CRESTOR) 20 MG tablet Take 1 tablet (20 mg) by mouth daily 90 tablet 0    VITAMIN E PO Take 1 tablet by mouth daily 400 units         Objective  BP (!) 180/78 (BP Location: Right arm, Patient Position: Sitting, Cuff Size: Adult Regular)   Pulse 86   Resp 14   Wt 72.6 kg (160 lb)   BMI 27.46 kg/m      General Appearance:    Alert, cooperative, no distress, appears stated age   Head:    Normocephalic, without obvious abnormality, atraumatic   Throat:   Lips, mucosa, and tongue normal; teeth and gums normal   Neck:   Supple, symmetrical, trachea midline, no adenopathy;        thyroid:  No enlargement/tenderness/nodules; no carotid    bruit or JVD   Back:     Symmetric, no curvature, ROM normal, no CVA tenderness   Lungs:     Clear to auscultation bilaterally, respirations unlabored   Chest wall:    No tenderness or deformity   Heart:    Regular rate and rhythm, S1 and S2 normal, no murmur, rub   or gallop   Abdomen:     Soft, non-tender, bowel sounds active all four quadrants,     no masses, no organomegaly   Extremities:   Normal, atraumatic, no cyanosis or edema   Pulses:   2+ and symmetric all upper extremities 1+ lower extremities   Skin:   Skin color, texture, turgor normal, no rashes or lesions     Results    Lab Results personally reviewed   Lab Results   Component Value Date    CHOL 138 06/21/2023    CHOL 162 11/30/2022     Lab Results   Component Value Date    HDL 62 06/21/2023    HDL 65 11/30/2022     No components found for: \"LDLCALC\"  Lab Results   Component Value Date    TRIG 72 06/21/2023    TRIG 149 11/30/2022     Lab " Results   Component Value Date    WBC 7.8 07/11/2022    HGB 13.1 07/11/2022    HCT 40.3 07/11/2022     07/11/2022     Lab Results   Component Value Date    BUN 15.0 06/21/2023     06/21/2023    CO2 26 06/21/2023             Thank you for allowing me to participate in the care of your patient.      Sincerely,     ROSSY BENSON MD     [facility]  cc:   Rossy Benson MD  [address], [address]  [place] [address]

## 2024-02-07 NOTE — PATIENT INSTRUCTIONS
Ms Amirah Oakley,  I enjoyed visiting with you again today.  I am a little concerned with the heart skips and the breathing.  We will get the heart monitor for 2 weeks and the stress test.  Per our conversation we will start a blood pressure pill, try 1/2 a day for 8 days then a whole a day, and if no issues call for 90 day supply [phone].  I will plan on seeing you 1 year but will let you know test results.  Esteban Benson

## 2024-02-07 NOTE — PROGRESS NOTES
[facility]  Heart Care Clinic Follow-up Note    Assessment & Plan        (I25.10,  I25.83) Coronary arteriosclerosis due to lipid rich plaque  (primary encounter diagnosis)  Comment: Due to abnormal nuclear stress test angiography showed normal left main, LAD with distal 30 to 40% stenosis, normal nondominant circumflex and normal dominant right coronary artery.  Need to work on prevention.  Given increased shortness of breath we will obtain stress echo although doubt this is ischemia related.     (I73.9) Peripheral vascular disease (H)  Comment: Based on noninvasive testing left lower extremity has unremarkable common iliac, external iliac, common femoral but distal superficial femoral artery significant stenosis.  Patent popliteal and patent infrapopliteal vessels.  Right side has unremarkable common iliac, external iliac, common femoral with patent popliteal, posterior tibial and anterior tibial, and dorsalis pedis.  Her claudication has resolved when I started Pletal a year ago and she is still continuing this and doing well.    (I65.23) Bilateral carotid artery stenosis  Comment: Based on carotid ultrasound from 2019 she has a 50 to 70% stenosis of the left ICA as well as the right ICA.  She is due for repeat carotid ultrasound.      (F17.200) Smoking addiction  Comment: She has resumed smoking, she knows she needs to stop, she will work on it.     (E78.00) Pure hypercholesterolemia  Comment: Total cholesterol 138 with an LDL of 62, these numbers are excellent on rosuvastatin 20 mg.     (I10) Benign essential hypertension  Comment: Elevated today, has not come down on Pletal so we will add amlodipine 5 mg tablets, I have asked her started 2-1/2 for a week and go to 5 mg.  Gave her 30-day supply of her works well she will call in for 90-day supply at her mail order.      (E66.9) Obesity, Class I, BMI 30-34.9  Comment: Continue to work on weight loss.    (R00.2) Palpitations  Comment: Suspect PACs or  PVCs and I will have her wear mobile 2-week event monitor.    (R06.09) CRANDALL (dyspnea on exertion)  Comment: This could represent ischemia but doubt based on unremarkable angiogram almost 2 years ago.  I suspect this is most likely COPD and recommend stop smoking and be seen by lung doctors if all looks good.    Plan  1.  2-week mobile outpatient cardiac telemetry and address if abnormal.  2.  Stress echo and address if abnormal.  3.  Stop smoking.  4.  Work on weight loss.  5.  Arrange for carotid ultrasound.  6.  Start amlodipine, 2.5 mg a day for 8 days and go to 5 mg a day, she will then call us up for 90-day prescription.  7.  Follow-up with me 1 year or sooner if needed.    Subjective  CC:68-year-old white female here for yearly follow-up although has not seen me in almost 2 years.  She states she has had increased shortness of breath on activity, she is tired all the time.  She tells me she has a heart racing, when she is at home after a day at work she will feel her heart skipping beats.  There is no increased shortness of breath or lightheadedness with the heart skips.  She tells me she is working 2 days a week to 3 days a week part-time at [employer] in [place] as a  and doing fairly well with that.  There is no significant chest pains, PND, orthopnea, syncope.    Medications  Current Outpatient Medications   Medication Sig Dispense Refill    Ascorbic Acid (VITAMIN C PO) Take 500 mg by mouth daily      aspirin 81 MG EC tablet [ASPIRIN 81 MG EC TABLET] Take 81 mg by mouth daily.      buPROPion (WELLBUTRIN XL) 150 MG 24 hr tablet TAKE 1 TABLET(150 MG) BY MOUTH EVERY MORNING 90 tablet 0    calcium carbonate (OS-KATELYN) 1500 (600 Ca) MG tablet Take 1,200 mg by mouth daily      cholecalciferol, vitamin D3, (D3-50 CHOLECALCIFEROL) 50,000 unit capsule [CHOLECALCIFEROL, VITAMIN D3, (D3-50 CHOLECALCIFEROL) 50,000 UNIT CAPSULE] Take 2,000 Units by mouth daily.             cilostazol (PLETAL) 100 MG tablet TAKE 1  "TABLET TWICE A  tablet 0    co-enzyme Q-10 30 mg capsule [CO-ENZYME Q-10 30 MG CAPSULE] Take 200 mg by mouth daily.             rosuvastatin (CRESTOR) 20 MG tablet Take 1 tablet (20 mg) by mouth daily 90 tablet 0    VITAMIN E PO Take 1 tablet by mouth daily 400 units         Objective  BP (!) 180/78 (BP Location: Right arm, Patient Position: Sitting, Cuff Size: Adult Regular)   Pulse 86   Resp 14   Wt 72.6 kg (160 lb)   BMI 27.46 kg/m      General Appearance:    Alert, cooperative, no distress, appears stated age   Head:    Normocephalic, without obvious abnormality, atraumatic   Throat:   Lips, mucosa, and tongue normal; teeth and gums normal   Neck:   Supple, symmetrical, trachea midline, no adenopathy;        thyroid:  No enlargement/tenderness/nodules; no carotid    bruit or JVD   Back:     Symmetric, no curvature, ROM normal, no CVA tenderness   Lungs:     Clear to auscultation bilaterally, respirations unlabored   Chest wall:    No tenderness or deformity   Heart:    Regular rate and rhythm, S1 and S2 normal, no murmur, rub   or gallop   Abdomen:     Soft, non-tender, bowel sounds active all four quadrants,     no masses, no organomegaly   Extremities:   Normal, atraumatic, no cyanosis or edema   Pulses:   2+ and symmetric all upper extremities 1+ lower extremities   Skin:   Skin color, texture, turgor normal, no rashes or lesions     Results    Lab Results personally reviewed   Lab Results   Component Value Date    CHOL 138 06/21/2023    CHOL 162 11/30/2022     Lab Results   Component Value Date    HDL 62 06/21/2023    HDL 65 11/30/2022     No components found for: \"LDLCALC\"  Lab Results   Component Value Date    TRIG 72 06/21/2023    TRIG 149 11/30/2022     Lab Results   Component Value Date    WBC 7.8 07/11/2022    HGB 13.1 07/11/2022    HCT 40.3 07/11/2022     07/11/2022     Lab Results   Component Value Date    BUN 15.0 06/21/2023     06/21/2023    CO2 26 06/21/2023           "

## 2024-02-07 NOTE — TELEPHONE ENCOUNTER
----- Message from Esteban Benson MD sent at 2/7/2024  8:49 AM CST -----  She has not had a carotid ultrasound and she needs 1, can you please order, I forgot to order it with her.  LF

## 2024-02-07 NOTE — TELEPHONE ENCOUNTER
Order placed. Arranged by central scheduling. -[facility]    Called patient and explained the need for additional imaging exam. She verbalized understanding- last carotid ultrasound was in 2019. Transferred to central scheduling to try to arrange the same day as her cardiac testing on 2/21. -[facility]

## 2024-02-08 ENCOUNTER — TELEPHONE (OUTPATIENT)
Dept: CARDIOLOGY | Facility: CLINIC | Age: 69
End: 2024-02-08
Payer: COMMERCIAL

## 2024-02-08 DIAGNOSIS — I10 BENIGN ESSENTIAL HYPERTENSION: ICD-10-CM

## 2024-02-08 RX ORDER — AMLODIPINE BESYLATE 5 MG/1
TABLET ORAL
Qty: 30 TABLET | Refills: 3 | Status: SHIPPED | OUTPATIENT
Start: 2024-02-08 | End: 2024-05-22 | Stop reason: DRUGHIGH

## 2024-02-08 NOTE — TELEPHONE ENCOUNTER
Received a direct call from Amirah. She notes that Dr. Benson prescribed her Amlodipine yesterday but has called [facility] a few times and they have not received the script. Writer reviewed chart and noticed that it was placed as a clinic administered medication. Writer changed the order and sent through the script to the pharmacy as we do not give out CAM BP medications in clinic. Pt  will  later today. -[facility]

## 2024-02-20 ENCOUNTER — TELEPHONE (OUTPATIENT)
Dept: CARDIOLOGY | Facility: CLINIC | Age: 69
End: 2024-02-20
Payer: COMMERCIAL

## 2024-02-20 NOTE — TELEPHONE ENCOUNTER
"Incoming fax from [facility] that the Amlodipine is \"not covered by patient plan\" requesting a drug change. Dispense report shows that Amlodipine was dispensed. PC to pharmacy, spoke with staff and confirms that the prescription went through, picked up by patient for a $1 co-pay. No issues. BRIANDA,RN   "

## 2024-02-21 ENCOUNTER — HOSPITAL ENCOUNTER (OUTPATIENT)
Dept: ULTRASOUND IMAGING | Facility: HOSPITAL | Age: 69
Discharge: HOME | End: 2024-02-21
Attending: INTERNAL MEDICINE
Payer: COMMERCIAL

## 2024-02-21 ENCOUNTER — HOSPITAL ENCOUNTER (OUTPATIENT)
Dept: CARDIOLOGY | Facility: HOSPITAL | Age: 69
Discharge: HOME | End: 2024-02-21
Attending: INTERNAL MEDICINE
Payer: COMMERCIAL

## 2024-02-21 DIAGNOSIS — I65.23 BILATERAL CAROTID ARTERY STENOSIS: ICD-10-CM

## 2024-02-21 DIAGNOSIS — R00.2 PALPITATIONS: ICD-10-CM

## 2024-02-21 DIAGNOSIS — I25.83 CORONARY ARTERIOSCLEROSIS DUE TO LIPID RICH PLAQUE: ICD-10-CM

## 2024-02-21 PROCEDURE — 93018 CV STRESS TEST I&R ONLY: CPT | Performed by: INTERNAL MEDICINE

## 2024-02-21 PROCEDURE — 93016 CV STRESS TEST SUPVJ ONLY: CPT | Performed by: INTERNAL MEDICINE

## 2024-02-21 PROCEDURE — 999N000096 CARDIAC MOBILE TELEMETRY MONITOR

## 2024-02-21 PROCEDURE — 93352 ADMIN ECG CONTRAST AGENT: CPT | Performed by: INTERNAL MEDICINE

## 2024-02-21 PROCEDURE — 93321 DOPPLER ECHO F-UP/LMTD STD: CPT | Mod: 26 | Performed by: INTERNAL MEDICINE

## 2024-02-21 PROCEDURE — 93350 STRESS TTE ONLY: CPT | Mod: 26 | Performed by: INTERNAL MEDICINE

## 2024-02-21 PROCEDURE — 93880 EXTRACRANIAL BILAT STUDY: CPT

## 2024-02-21 PROCEDURE — 255N000002 HC RX 255 OP 636: Performed by: INTERNAL MEDICINE

## 2024-02-21 PROCEDURE — 93325 DOPPLER ECHO COLOR FLOW MAPG: CPT | Mod: 26 | Performed by: INTERNAL MEDICINE

## 2024-02-21 RX ADMIN — PERFLUTREN 5 ML: 6.52 INJECTION, SUSPENSION INTRAVENOUS at 09:20

## 2024-03-02 ENCOUNTER — HOSPITAL ENCOUNTER (EMERGENCY)
Facility: HOSPITAL | Age: 69
Discharge: HOME | End: 2024-03-02
Attending: EMERGENCY MEDICINE | Admitting: EMERGENCY MEDICINE
Payer: COMMERCIAL

## 2024-03-02 ENCOUNTER — APPOINTMENT (OUTPATIENT)
Dept: CT IMAGING | Facility: HOSPITAL | Age: 69
End: 2024-03-02
Attending: EMERGENCY MEDICINE
Payer: COMMERCIAL

## 2024-03-02 VITALS
HEART RATE: 86 BPM | SYSTOLIC BLOOD PRESSURE: 168 MMHG | HEIGHT: 64 IN | BODY MASS INDEX: 27.49 KG/M2 | DIASTOLIC BLOOD PRESSURE: 78 MMHG | WEIGHT: 161 LBS | OXYGEN SATURATION: 95 % | TEMPERATURE: 97.9 F | RESPIRATION RATE: 20 BRPM

## 2024-03-02 DIAGNOSIS — N10 PYELONEPHRITIS, ACUTE: ICD-10-CM

## 2024-03-02 LAB
ALBUMIN UR-MCNC: 10 MG/DL
ANION GAP SERPL CALCULATED.3IONS-SCNC: 10 MMOL/L (ref 7–15)
APPEARANCE UR: CLEAR
BASOPHILS # BLD AUTO: 0.1 10E3/UL (ref 0–0.2)
BASOPHILS NFR BLD AUTO: 1 %
BILIRUB UR QL STRIP: NEGATIVE
BUN SERPL-MCNC: 16 MG/DL (ref 8–23)
CALCIUM SERPL-MCNC: 9.4 MG/DL (ref 8.8–10.2)
CHLORIDE SERPL-SCNC: 105 MMOL/L (ref 98–107)
COLOR UR AUTO: ABNORMAL
CREAT SERPL-MCNC: 0.97 MG/DL (ref 0.51–0.95)
DEPRECATED HCO3 PLAS-SCNC: 25 MMOL/L (ref 22–29)
EGFRCR SERPLBLD CKD-EPI 2021: 64 ML/MIN/1.73M2
EOSINOPHIL # BLD AUTO: 0.2 10E3/UL (ref 0–0.7)
EOSINOPHIL NFR BLD AUTO: 2 %
ERYTHROCYTE [DISTWIDTH] IN BLOOD BY AUTOMATED COUNT: 12.8 % (ref 10–15)
GLUCOSE SERPL-MCNC: 119 MG/DL (ref 70–99)
GLUCOSE UR STRIP-MCNC: NEGATIVE MG/DL
HCT VFR BLD AUTO: 36.2 % (ref 35–47)
HGB BLD-MCNC: 12 G/DL (ref 11.7–15.7)
HGB UR QL STRIP: ABNORMAL
IMM GRANULOCYTES # BLD: 0.1 10E3/UL
IMM GRANULOCYTES NFR BLD: 1 %
KETONES UR STRIP-MCNC: NEGATIVE MG/DL
LACTATE SERPL-SCNC: 0.5 MMOL/L (ref 0.7–2)
LEUKOCYTE ESTERASE UR QL STRIP: NEGATIVE
LYMPHOCYTES # BLD AUTO: 2.1 10E3/UL (ref 0.8–5.3)
LYMPHOCYTES NFR BLD AUTO: 15 %
MAGNESIUM SERPL-MCNC: 1.8 MG/DL (ref 1.7–2.3)
MCH RBC QN AUTO: 30.7 PG (ref 26.5–33)
MCHC RBC AUTO-ENTMCNC: 33.1 G/DL (ref 31.5–36.5)
MCV RBC AUTO: 93 FL (ref 78–100)
MONOCYTES # BLD AUTO: 1 10E3/UL (ref 0–1.3)
MONOCYTES NFR BLD AUTO: 7 %
MUCOUS THREADS #/AREA URNS LPF: PRESENT /LPF
NEUTROPHILS # BLD AUTO: 10.1 10E3/UL (ref 1.6–8.3)
NEUTROPHILS NFR BLD AUTO: 74 %
NITRATE UR QL: NEGATIVE
NRBC # BLD AUTO: 0 10E3/UL
NRBC BLD AUTO-RTO: 0 /100
PH UR STRIP: 6 [PH] (ref 5–7)
PLATELET # BLD AUTO: 321 10E3/UL (ref 150–450)
POTASSIUM SERPL-SCNC: 3.8 MMOL/L (ref 3.4–5.3)
RBC # BLD AUTO: 3.91 10E6/UL (ref 3.8–5.2)
RBC URINE: 2 /HPF
SODIUM SERPL-SCNC: 140 MMOL/L (ref 135–145)
SP GR UR STRIP: 1.02 (ref 1–1.03)
SQUAMOUS EPITHELIAL: <1 /HPF
UROBILINOGEN UR STRIP-MCNC: <2 MG/DL
WBC # BLD AUTO: 13.5 10E3/UL (ref 4–11)
WBC URINE: 1 /HPF

## 2024-03-02 PROCEDURE — 81001 URINALYSIS AUTO W/SCOPE: CPT | Performed by: EMERGENCY MEDICINE

## 2024-03-02 PROCEDURE — 80048 BASIC METABOLIC PNL TOTAL CA: CPT | Performed by: EMERGENCY MEDICINE

## 2024-03-02 PROCEDURE — 36415 COLL VENOUS BLD VENIPUNCTURE: CPT | Performed by: EMERGENCY MEDICINE

## 2024-03-02 PROCEDURE — 96361 HYDRATE IV INFUSION ADD-ON: CPT

## 2024-03-02 PROCEDURE — 99285 EMERGENCY DEPT VISIT HI MDM: CPT | Mod: 25

## 2024-03-02 PROCEDURE — 96374 THER/PROPH/DIAG INJ IV PUSH: CPT | Mod: 59

## 2024-03-02 PROCEDURE — 250N000011 HC RX IP 250 OP 636: Performed by: EMERGENCY MEDICINE

## 2024-03-02 PROCEDURE — 83605 ASSAY OF LACTIC ACID: CPT | Performed by: EMERGENCY MEDICINE

## 2024-03-02 PROCEDURE — 250N000013 HC RX MED GY IP 250 OP 250 PS 637: Performed by: EMERGENCY MEDICINE

## 2024-03-02 PROCEDURE — 74177 CT ABD & PELVIS W/CONTRAST: CPT

## 2024-03-02 PROCEDURE — 258N000003 HC RX IP 258 OP 636: Performed by: EMERGENCY MEDICINE

## 2024-03-02 PROCEDURE — 83735 ASSAY OF MAGNESIUM: CPT | Performed by: EMERGENCY MEDICINE

## 2024-03-02 PROCEDURE — 85025 COMPLETE CBC W/AUTO DIFF WBC: CPT | Performed by: EMERGENCY MEDICINE

## 2024-03-02 RX ORDER — IOPAMIDOL 755 MG/ML
79 INJECTION, SOLUTION INTRAVASCULAR ONCE
Status: COMPLETED | OUTPATIENT
Start: 2024-03-02 | End: 2024-03-02

## 2024-03-02 RX ORDER — LEVOFLOXACIN 750 MG/1
750 TABLET, FILM COATED ORAL DAILY
Qty: 5 TABLET | Refills: 0 | Status: SHIPPED | OUTPATIENT
Start: 2024-03-02 | End: 2024-03-07

## 2024-03-02 RX ORDER — ONDANSETRON 2 MG/ML
4 INJECTION INTRAMUSCULAR; INTRAVENOUS ONCE
Status: COMPLETED | OUTPATIENT
Start: 2024-03-02 | End: 2024-03-02

## 2024-03-02 RX ORDER — ACETAMINOPHEN 325 MG/1
975 TABLET ORAL ONCE
Status: COMPLETED | OUTPATIENT
Start: 2024-03-02 | End: 2024-03-02

## 2024-03-02 RX ADMIN — ONDANSETRON 4 MG: 2 INJECTION INTRAMUSCULAR; INTRAVENOUS at 15:46

## 2024-03-02 RX ADMIN — IOPAMIDOL 79 ML: 755 INJECTION, SOLUTION INTRAVENOUS at 16:50

## 2024-03-02 RX ADMIN — ACETAMINOPHEN 975 MG: 325 TABLET ORAL at 15:47

## 2024-03-02 RX ADMIN — Medication 50 MG: at 15:47

## 2024-03-02 RX ADMIN — SODIUM CHLORIDE 1000 ML: 9 INJECTION, SOLUTION INTRAVENOUS at 15:47

## 2024-03-02 ASSESSMENT — COLUMBIA-SUICIDE SEVERITY RATING SCALE - C-SSRS
1. IN THE PAST MONTH, HAVE YOU WISHED YOU WERE DEAD OR WISHED YOU COULD GO TO SLEEP AND NOT WAKE UP?: NO
6. HAVE YOU EVER DONE ANYTHING, STARTED TO DO ANYTHING, OR PREPARED TO DO ANYTHING TO END YOUR LIFE?: NO
2. HAVE YOU ACTUALLY HAD ANY THOUGHTS OF KILLING YOURSELF IN THE PAST MONTH?: NO

## 2024-03-02 ASSESSMENT — ACTIVITIES OF DAILY LIVING (ADL)
ADLS_ACUITY_SCORE: 35
ADLS_ACUITY_SCORE: 35

## 2024-03-02 NOTE — ED PROVIDER NOTES
EMERGENCY DEPARTMENT ENCOUNTER      NAME: Amirah Oakley  AGE: 68 year old female  YOB: 1955  MRN: [id number]  EVALUATION DATE & TIME: 3/2/2024  3:06 PM    PCP: Hector Luu    ED PROVIDER: Yakelin Quintanilla M.D.      Chief Complaint   Patient presents with    Flank Pain     FINAL IMPRESSION:  1. Pyelonephritis, acute      ED COURSE & MEDICAL DECISION MAKING:    Pertinent Labs & Imaging studies reviewed. (See chart for details)  ED Course as of 03/02/24 2035   Sat Mar 02, 2024   1540 Patient is a pleasant 68-year-old female comes in today for evaluation of left flank pain that is been wrapping around to her left over lower abdomen.  It started yesterday and kept her up all night.  She has had some vomiting.  She reports pretty constant pain and nothing seems to make it better or worse.  She says about a week and a half ago she had some blood in her urine that lasted for a couple of days.  She reports that she is on some new blood thinner that I am not familiar with.  She denies any history of kidney stones.  She reports chills but no fevers.  She has a history of peripheral artery disease and hypertension and cholesterol as well as depression.  On my exam she had no significant tenderness on my exam but did look uncomfortable.  We will plan to get some basic blood work and get CT imaging on her belly to further evaluate.  Urine was already obtained and there is no blood in it so I am can actually do a contrasted study to make sure we are seeing everything.  Heart rate was elevated on arrival.  Will treat her symptomatically with some Tylenol and dimenhydrinate and Zofran as well as some IV fluids.  If that does not help we can give her a little morphine.  I discussed the plan with her and she is in agreement.  Her last dose of Tylenol was at 10:00 this morning and she is not supposed to take any Aleve or Advil so we will hold off on Toradol at this time.   1711 CT scan shows  pyelonephritis.  Urine was very unremarkable.  I will add on a lactate just to make sure we do not see any other signs of sepsis.  White count was 13.5 and her heart rate was elevated on arrival although that has improved after some fluids.  Pain is pretty well-controlled with some Tylenol and dimenhydrinate as well as some Zofran.  They did not see a stone in there.  I think if the lactate looks okay she can go home on some oral antibiotics with good return precautions.   1734 I will back and talk to the patient and we talked about her CT findings and how they were inconsistent with her urine.  I told her we would check a lactic acid but if it came back normal we probably just treat her with antibiotics and have her keep a close eye on symptoms.  She was comfortable with that plan.  I will send her home on a 5-day course of Levaquin.  She is comfortable with this plan.     3:47 PM I met the patient and performed my initial interview and exam.      Medical Decision Making    History:  Supplemental history from: Patient  External Record(s) reviewed: I reviewed the note from: N/A    Work Up:  Emergent/Severe conditions considered and evaluated for: UTI, diverticulitis, hydronephrosis, obstructing stone, infected stone, perforation.  I independently reviewed and interpreted CT abdomen pelvis which showed some left-sided ureteral prominence.  See full radiology report for all details  In additional to work up documented, I considered the following work up: None  Medications given that require intensive monitoring for toxicity: None    External consultation:  Discussion of management with another provider: None    Complicating factors:  Care impacted by chronic illness: Carotid artery stenosis, tobacco use, and hypertension.   Care affected by social determinants of health: None    Disposition considerations: Considered admission but patient's lab work really looks quite good  Prescriptions considered/prescribed:  Levaquin    At the conclusion of the encounter I discussed  the results of all of the tests and the disposition with patient.   All questions were answered.  The patient acknowledged understanding and was involved in the decision making regarding the overall care plan.      I discussed with patient the utility, limitations and findings of the exam/interventions/studies done during this visit as well as the list of differential diagnosis and symptoms to monitor/return to ER for.  Additional verbal discharge instructions were provided.     MEDICATIONS GIVEN IN THE EMERGENCY:  Medications   acetaminophen (TYLENOL) tablet 975 mg (975 mg Oral $Given 3/2/24 1547)   dimenhyDRINATE chew tab 50 mg (50 mg Oral $Given 3/2/24 1547)   ondansetron (ZOFRAN) injection 4 mg (4 mg Intravenous $Given 3/2/24 1546)   sodium chloride 0.9% BOLUS 1,000 mL (0 mLs Intravenous Stopped 3/2/24 1659)   iopamidol (ISOVUE-370) solution 79 mL (79 mLs Intravenous $Given 3/2/24 1650)     NEW PRESCRIPTIONS STARTED AT TODAY'S ER VISIT  Discharge Medication List as of 3/2/2024  5:44 PM        START taking these medications    Details   levofloxacin (LEVAQUIN) 750 MG tablet Take 1 tablet (750 mg) by mouth daily for 5 days, Disp-5 tablet, R-0, E-Prescribe                =================================================================    HPI    Triage Note: Patient comes in with left flank pain that wraps around to the LLQ since yesterday. Denies history of kidney stones, states last week had an episode of hematuria. Reports bloating and has had little BM output since yesterday.     Triage Assessment (Adult)       Row Name 03/02/24 0322          Triage Assessment    Airway WDL WDL        Respiratory WDL    Respiratory WDL WDL        Skin Circulation/Temperature WDL    Skin Circulation/Temperature WDL WDL        Cardiac WDL    Cardiac WDL WDL        Peripheral/Neurovascular WDL    Peripheral Neurovascular WDL WDL        Cognitive/Neuro/Behavioral WDL     Cognitive/Neuro/Behavioral WDL WDL                         Patient information was obtained from: Patient    Use of : N/A       Amirah SOSA J Carlos Oakley is a 68 year old female who presents with flank pain.     Patient reports that since last night she has had constant left flank pain that wraps to her abdomen with vomiting. Has had chills. Of note she had two days of hematuria about a week and a half ago that resolved on its own. Is on Cilostazol, blood pressure, cholesterol, and antidepressant medications. Was started on her blood pressure medication two weeks ago. Patient last took tylenol at 1000 today. Of note she does have a heart monitor currently. No history of kidney stones. Denies any other complaints at this time.     PAST MEDICAL HISTORY:  Past Medical History:   Diagnosis Date    Acute bronchitis with bronchospasm 11/28/2015    Patellar Chondromalacia     Created by Conversion     Tibia/fibula fracture 11/27/2015    Upper Back Pain (Between Shoulder Blades)     Created by Conversion        PAST SURGICAL HISTORY:  Past Surgical History:   Procedure Laterality Date    CV CORONARY ANGIOGRAM N/A 7/11/2022    Procedure: Coronary Angiogram;  Surgeon: Britney Barriga MD;  Location: [facility]    CV LEFT HEART CATH N/A 7/11/2022    Procedure: Left Heart Catheterization;  Surgeon: Britney Barriga MD;  Location: [facility]    HC OPEN RX WEIGHTBEAR TIB,FIX TIB ONLY Left 11/27/2015    Procedure: OPEN REDUCTION INTERNAL FIXATION LEFT TIBIAL FIBULAR FRACTURE;  Surgeon: Roberto Zepeda MD;  Location: [facility];  Service: Orthopedics    HC REMOVAL GALLBLADDER  1984    Description: Cholecystectomy;  Recorded: 01/08/2010;    HYSTERECTOMY  2002    IR EXTREMITY ANGIOGRAM LEFT  09/17/2019    IR EXTREMITY ANGIOGRAM LEFT  09/17/2019    OOPHORECTOMY  2002    [facility] APPENDECTOMY  1984    Description: Appendectomy;  Recorded: 01/08/2010;    [facility] PART REMOVAL COLON W ANASTOMOSIS       Description: Partial Colectomy;  Recorded: 03/11/2014;       CURRENT MEDICATIONS:    No current facility-administered medications for this encounter.    Current Outpatient Medications:     levofloxacin (LEVAQUIN) 750 MG tablet, Take 1 tablet (750 mg) by mouth daily for 5 days, Disp: 5 tablet, Rfl: 0    amLODIPine (NORVASC) 5 MG tablet, Take 2.5 mg daily for 1 week then go to 5 mg daily thereafter, Disp: 30 tablet, Rfl: 3    Ascorbic Acid (VITAMIN C PO), Take 500 mg by mouth daily, Disp: , Rfl:     aspirin 81 MG EC tablet, [ASPIRIN 81 MG EC TABLET] Take 81 mg by mouth daily., Disp: , Rfl:     buPROPion (WELLBUTRIN XL) 150 MG 24 hr tablet, TAKE 1 TABLET(150 MG) BY MOUTH EVERY MORNING, Disp: 90 tablet, Rfl: 0    calcium carbonate (OS-KATELYN) 1500 (600 Ca) MG tablet, Take 1,200 mg by mouth daily, Disp: , Rfl:     cholecalciferol, vitamin D3, (D3-50 CHOLECALCIFEROL) 50,000 unit capsule, [CHOLECALCIFEROL, VITAMIN D3, (D3-50 CHOLECALCIFEROL) 50,000 UNIT CAPSULE] Take 2,000 Units by mouth daily.       , Disp: , Rfl:     cilostazol (PLETAL) 100 MG tablet, TAKE 1 TABLET TWICE A DAY, Disp: 180 tablet, Rfl: 0    co-enzyme Q-10 30 mg capsule, [CO-ENZYME Q-10 30 MG CAPSULE] Take 200 mg by mouth daily.       , Disp: , Rfl:     rosuvastatin (CRESTOR) 20 MG tablet, Take 1 tablet (20 mg) by mouth daily, Disp: 90 tablet, Rfl: 0    VITAMIN E PO, Take 1 tablet by mouth daily 400 units, Disp: , Rfl:     ALLERGIES:  No Known Allergies    FAMILY HISTORY:  Family History   Problem Relation Age of Onset    Atrial fibrillation Mother     Thyroid Disease Mother     Alzheimer Disease Mother     Myocardial Infarction Mother     Cancer Father     Myocardial Infarction Father     Heart Disease Sister     Heart Disease Sister     Cancer Sister         Rectal 63     Clotting Disorder Sister     Cancer Brother         started in kidny - metastasis    Heart Disease Brother     Ovarian Cancer Maternal Aunt     Cancer Maternal Aunt         lung    Cancer  "Maternal Aunt         lung    Hereditary Breast and Ovarian Cancer Syndrome No family hx of     Breast Cancer No family hx of     Endometrial Cancer No family hx of        SOCIAL HISTORY:   Social History     Socioeconomic History    Marital status:    Tobacco Use    Smoking status: Former     Packs/day: 1.00     Years: 50.00     Additional pack years: 0.00     Total pack years: 50.00     Types: Cigarettes     Quit date: 2022     Years since quittin.7    Smokeless tobacco: Never   Substance and Sexual Activity    Alcohol use: Yes     Alcohol/week: 2.0 standard drinks of alcohol    Drug use: No   Social History Narrative    2015: , employed part-time.       PHYSICAL EXAM    VITAL SIGNS: BP (!) 168/78   Pulse 86   Temp 97.9  F (36.6  C) (Temporal)   Resp 20   Ht 1.626 m (5' 4\")   Wt 73 kg (161 lb)   SpO2 95%   BMI 27.64 kg/m     GENERAL: Awake, alert, answering questions appropriately, mildly uncomfortable.   SPEECH:  Easy to understand speech, Normal volume and laney  PULMONARY: No respiratory distress, Lungs clear to auscultation bilaterally  CARDIOVASCULAR: Regular rate and rhythm, Distal pulses present and normal.  ABDOMINAL: Soft, Nondistended, Nontender, No rebound or guarding, No palpable masses  EXTREMITIES: No lower extremity edema.  PSYCH: Normal mood and affect     LAB:  All pertinent labs reviewed and interpreted.  Results for orders placed or performed during the hospital encounter of 24   CT Abdomen Pelvis w Contrast    Impression    IMPRESSION:   1.  Urothelial thickening and enhancement left proximal ureter and renal pelvis compatible with pyelitis. There is also patchy decreased enhancement within the lower pole left kidney compatible with pyelonephritis.    2.  Changes of cystitis. Preop cholecystectomy and hysterectomy.   Basic metabolic panel   Result Value Ref Range    Sodium 140 135 - 145 mmol/L    Potassium 3.8 3.4 - 5.3 mmol/L    Chloride 105 98 - 107 " mmol/L    Carbon Dioxide (CO2) 25 22 - 29 mmol/L    Anion Gap 10 7 - 15 mmol/L    Urea Nitrogen 16.0 8.0 - 23.0 mg/dL    Creatinine 0.97 (H) 0.51 - 0.95 mg/dL    GFR Estimate 64 >60 mL/min/1.73m2    Calcium 9.4 8.8 - 10.2 mg/dL    Glucose 119 (H) 70 - 99 mg/dL   Result Value Ref Range    Magnesium 1.8 1.7 - 2.3 mg/dL   UA with Microscopic reflex to Culture    Specimen: Urine, Midstream   Result Value Ref Range    Color Urine Light Yellow Colorless, Straw, Light Yellow, Yellow    Appearance Urine Clear Clear    Glucose Urine Negative Negative mg/dL    Bilirubin Urine Negative Negative    Ketones Urine Negative Negative mg/dL    Specific Gravity Urine 1.017 1.001 - 1.030    Blood Urine 0.1 mg/dL (A) Negative    pH Urine 6.0 5.0 - 7.0    Protein Albumin Urine 10 (A) Negative mg/dL    Urobilinogen Urine <2.0 <2.0 mg/dL    Nitrite Urine Negative Negative    Leukocyte Esterase Urine Negative Negative    Mucus Urine Present (A) None Seen /LPF    RBC Urine 2 <=2 /HPF    WBC Urine 1 <=5 /HPF    Squamous Epithelials Urine <1 <=1 /HPF   CBC with platelets and differential   Result Value Ref Range    WBC Count 13.5 (H) 4.0 - 11.0 10e3/uL    RBC Count 3.91 3.80 - 5.20 10e6/uL    Hemoglobin 12.0 11.7 - 15.7 g/dL    Hematocrit 36.2 35.0 - 47.0 %    MCV 93 78 - 100 fL    MCH 30.7 26.5 - 33.0 pg    MCHC 33.1 31.5 - 36.5 g/dL    RDW 12.8 10.0 - 15.0 %    Platelet Count 321 150 - 450 10e3/uL    % Neutrophils 74 %    % Lymphocytes 15 %    % Monocytes 7 %    % Eosinophils 2 %    % Basophils 1 %    % Immature Granulocytes 1 %    NRBCs per 100 WBC 0 <1 /100    Absolute Neutrophils 10.1 (H) 1.6 - 8.3 10e3/uL    Absolute Lymphocytes 2.1 0.8 - 5.3 10e3/uL    Absolute Monocytes 1.0 0.0 - 1.3 10e3/uL    Absolute Eosinophils 0.2 0.0 - 0.7 10e3/uL    Absolute Basophils 0.1 0.0 - 0.2 10e3/uL    Absolute Immature Granulocytes 0.1 <=0.4 10e3/uL    Absolute NRBCs 0.0 10e3/uL   Lactic acid whole blood   Result Value Ref Range    Lactic Acid 0.5 (L)  0.7 - 2.0 mmol/L       RADIOLOGY:  CT Abdomen Pelvis w Contrast   Final Result   IMPRESSION:    1.  Urothelial thickening and enhancement left proximal ureter and renal pelvis compatible with pyelitis. There is also patchy decreased enhancement within the lower pole left kidney compatible with pyelonephritis.      2.  Changes of cystitis. Preop cholecystectomy and hysterectomy.            I, Hamilton Munoz, am serving as a scribe to document services personally performed by Dr. Quintanilla based on my observation and the provider's statements to me. I, Yakelin Quintanilla MD attest that Hamilton Munoz is acting in a scribe capacity, has observed my performance of the services and has documented them in accordance with my direction.    Yakelin Quintanilla M.D.  Emergency Medicine  [facility] EMERGENCY DEPARTMENT  [facility] [facility] [address]  [phone]  Dept: [phone]       Yakelin Quintanilla MD  03/02/24 7362

## 2024-03-02 NOTE — ED TRIAGE NOTES
Patient comes in with left flank pain that wraps around to the LLQ since yesterday. Denies history of kidney stones, states last week had an episode of hematuria. Reports bloating and has had little BM output since yesterday.     Triage Assessment (Adult)       Row Name 03/02/24 1509          Triage Assessment    Airway WDL WDL        Respiratory WDL    Respiratory WDL WDL        Skin Circulation/Temperature WDL    Skin Circulation/Temperature WDL WDL        Cardiac WDL    Cardiac WDL WDL        Peripheral/Neurovascular WDL    Peripheral Neurovascular WDL WDL        Cognitive/Neuro/Behavioral WDL    Cognitive/Neuro/Behavioral WDL WDL

## 2024-03-02 NOTE — DISCHARGE INSTRUCTIONS
You were seen in the Emergency Department today for left-sided flank pain.  Your lab work showed no cause of your symptoms. Your imaging studies showed a possible infection of your left kidney.  This would fit with your symptoms however your urine looked really good.  You were prescribed Levaquin. You should take all medications as prescribed.  Follow up with your primary care physician to ensure resolution of symptoms. Return if you have new or worsening symptoms.

## 2024-03-08 PROCEDURE — 93228 REMOTE 30 DAY ECG REV/REPORT: CPT | Performed by: INTERNAL MEDICINE

## 2024-03-14 ENCOUNTER — LAB REQUISITION (OUTPATIENT)
Dept: LAB | Facility: CLINIC | Age: 69
End: 2024-03-14
Payer: COMMERCIAL

## 2024-03-14 DIAGNOSIS — R31.0 GROSS HEMATURIA: ICD-10-CM

## 2024-03-14 LAB — PHQ9 SCORE: 12

## 2024-03-14 PROCEDURE — 87086 URINE CULTURE/COLONY COUNT: CPT | Mod: ORL | Performed by: FAMILY MEDICINE

## 2024-03-16 LAB — BACTERIA UR CULT: NO GROWTH

## 2024-03-20 ENCOUNTER — TELEPHONE (OUTPATIENT)
Dept: CARDIOLOGY | Facility: CLINIC | Age: 69
End: 2024-03-20
Payer: COMMERCIAL

## 2024-03-20 NOTE — TELEPHONE ENCOUNTER
[place] Community Measures Blood Pressure guideline reviewed.  Patients recent blood pressure is outside of guideline parameters.  Called pt to review, no answer.  Left voicemail message asking patient to check their blood pressure using a home blood pressure cuff or by going to a [facility].  Patient instructed to then call [phone] ([facility]) and leave a message with their name, date of birth, and blood pressure reading that was completed within the last 24 hours and where it was completed.  Will await call back for further review.    ALIZA Segura

## 2024-03-26 NOTE — TELEPHONE ENCOUNTER
Mychart message to patient asking her to check a weeks worth of blood pressures and will send this to [facility] for review as opposed to just one value. -sarah

## 2024-03-26 NOTE — TELEPHONE ENCOUNTER
Patient returned call and left voicemail message with update blood pressure reading.      Last Blood Pressure: 168/78   Last Heart Rate: 86  Date: 03/02/2024  Location: ER    Today's Blood Pressure: 155/72  Location: Home BP    Patient reported blood pressure updated in Epic. Blood pressure continues to fall outside of the [place] Community Measures guidelines.  Message sent to primary cardiology team for further review.

## 2024-04-09 NOTE — TELEPHONE ENCOUNTER
Please see message trail below.  Will check back with pt on 4/15/24 to see if she has reported 1-2 weeks worth of BP readings per GEETHA Christopher suggestion.  Thanks    TA Gaona

## 2024-04-14 DIAGNOSIS — I73.9 PERIPHERAL VASCULAR DISEASE (H): ICD-10-CM

## 2024-04-16 RX ORDER — CILOSTAZOL 100 MG/1
100 TABLET ORAL 2 TIMES DAILY
Qty: 180 TABLET | Refills: 0 | Status: SHIPPED | OUTPATIENT
Start: 2024-04-16 | End: 2024-06-28

## 2024-04-18 ENCOUNTER — HOSPITAL ENCOUNTER (OUTPATIENT)
Dept: MAMMOGRAPHY | Facility: CLINIC | Age: 69
Discharge: HOME | End: 2024-04-18
Attending: FAMILY MEDICINE | Admitting: FAMILY MEDICINE
Payer: COMMERCIAL

## 2024-04-18 DIAGNOSIS — Z12.31 VISIT FOR SCREENING MAMMOGRAM: ICD-10-CM

## 2024-04-18 PROCEDURE — 77063 BREAST TOMOSYNTHESIS BI: CPT

## 2024-04-23 ENCOUNTER — MYC MEDICAL ADVICE (OUTPATIENT)
Dept: CARDIOLOGY | Facility: CLINIC | Age: 69
End: 2024-04-23
Payer: COMMERCIAL

## 2024-04-23 DIAGNOSIS — I10 BENIGN ESSENTIAL HYPERTENSION: ICD-10-CM

## 2024-04-29 NOTE — TELEPHONE ENCOUNTER
Pt has not reported any BP as requested by GEETHA Christopher over the two weeks    [place] reviewed.  Patients recent blood pressure is outside of guideline parameters.      Called pt to review, no answer.  Left voicemail message asking patient to check their blood pressure using a home blood pressure cuff or by going to a [facility].      Patient instructed to then call [phone] (East) and leave a message with their name, date of birth, and blood pressure reading that was completed within the last 24 hours and where it was completed.      Will await call back for further review. Thanks    TA Gaona

## 2024-05-01 VITALS — SYSTOLIC BLOOD PRESSURE: 158 MMHG | DIASTOLIC BLOOD PRESSURE: 73 MMHG

## 2024-05-01 NOTE — TELEPHONE ENCOUNTER
Patient returned call and left voicemail message with update blood pressure reading.      Last Blood Pressure: 155/72  Date: 03/26/24    Today's Blood Pressure: 158/73  Today's Heart Rate: 104  Location: Home BP    Patient reported blood pressure updated in Epic. Blood pressure continues to fall outside of the [place] Community Me[place] guidelines.  Message sent to primary cardiology team for further review.

## 2024-05-02 NOTE — TELEPHONE ENCOUNTER
LM to discuss elevated blood pressure and to inquire about any additional values to report to [facility]. Direct line provided for call back. -[facility] [place]

## 2024-05-17 NOTE — TELEPHONE ENCOUNTER
Patient returned call and left voicemail message with update blood pressure reading.      Last Blood Pressure: 158/73   Date: 05/01/24    Blood Pressures with Heart Rates:   156/78, 102  152/72, 101  149/70, 95  155/73, 100  154/73, 101  Location: Home BP    Patient reported blood pressure updated in Epic. Blood pressure continues to fall outside of the [place] Community Me[place] guidelines.  Message sent to primary cardiology team for further review.

## 2024-05-21 NOTE — TELEPHONE ENCOUNTER
Noted. Pt on 5 mg of Amlodipine. Will route. LM with patient to let her know that this was done and to discuss. Direct line provided for call back. -[facility] [place]

## 2024-05-21 NOTE — TELEPHONE ENCOUNTER
---- Message -----  From: Esteban Benson MD  Sent: 5/21/2024   3:21 PM CDT  To: Candi Gillespie RN  Subject: FW: elevated Blood pressure                      Given her carotid stenosis I am a little hesitant to push blood pressure much lower and I am happy with these numbers and would continue amlodipine 5 mg a day.  LF  ----- Message -----  From: Candi Gillespie, RN  Sent: 5/21/2024  11:59 AM CDT  To: Esteban Benson MD  Subject: elevated Blood pressure                          ----- Message from Candi Gillespie RN sent at 5/21/2024 11:59 AM CDT -----  BP remains in the 150's systolic after starting Amlodipine 5 mg in February. Recommendations?     ----- Message sent from Candelaria Christian MA to Amirah Urbina at 4/23/2024 11:39 AM -----   Good morning Amirah,      Gris is Candelaria from [facility] clinic. You were supposed to send back 2 weeks of blood pressures to the clinic for the nurse to review.  According to your chart, I do not see where that has been done.  You did miss your appointment with Jordi on dfd not do so yet.      Could you please send that in IF you have a BP cuff at home?  Thanks so much!    Maryellen Gaona

## 2024-05-22 RX ORDER — AMLODIPINE BESYLATE 5 MG/1
5 TABLET ORAL 2 TIMES DAILY
COMMUNITY

## 2024-05-22 NOTE — TELEPHONE ENCOUNTER
Pt called in after writer left message about OK with BP numbers. Pt updated that she is actually on 5 mg of Amlodipine BID- [facility] Updated med list. -[facility] [place]

## 2024-06-27 DIAGNOSIS — I73.9 PERIPHERAL VASCULAR DISEASE (H): ICD-10-CM

## 2024-06-28 RX ORDER — CILOSTAZOL 100 MG/1
100 TABLET ORAL 2 TIMES DAILY
Qty: 180 TABLET | Refills: 2 | Status: SHIPPED | OUTPATIENT
Start: 2024-06-28

## 2024-08-31 ENCOUNTER — HEALTH MAINTENANCE LETTER (OUTPATIENT)
Age: 69
End: 2024-08-31

## 2024-09-12 ENCOUNTER — TRANSFERRED RECORDS (OUTPATIENT)
Dept: HEALTH INFORMATION MANAGEMENT | Facility: CLINIC | Age: 69
End: 2024-09-12
Payer: COMMERCIAL

## 2024-09-12 LAB — PHQ9 SCORE: 3

## 2024-09-20 ENCOUNTER — LAB REQUISITION (OUTPATIENT)
Dept: LAB | Facility: CLINIC | Age: 69
End: 2024-09-20
Payer: COMMERCIAL

## 2024-09-20 DIAGNOSIS — I10 ESSENTIAL (PRIMARY) HYPERTENSION: ICD-10-CM

## 2024-09-20 DIAGNOSIS — R16.0 HEPATOMEGALY, NOT ELSEWHERE CLASSIFIED: ICD-10-CM

## 2024-09-20 DIAGNOSIS — Z13.220 ENCOUNTER FOR SCREENING FOR LIPOID DISORDERS: ICD-10-CM

## 2024-09-20 LAB
ALBUMIN SERPL BCG-MCNC: 4.4 G/DL (ref 3.5–5.2)
ALP SERPL-CCNC: 81 U/L (ref 40–150)
ALT SERPL W P-5'-P-CCNC: 21 U/L (ref 0–50)
ANION GAP SERPL CALCULATED.3IONS-SCNC: 11 MMOL/L (ref 7–15)
AST SERPL W P-5'-P-CCNC: 30 U/L (ref 0–45)
BILIRUB SERPL-MCNC: <0.2 MG/DL
BUN SERPL-MCNC: 18 MG/DL (ref 8–23)
CALCIUM SERPL-MCNC: 9.8 MG/DL (ref 8.8–10.4)
CHLORIDE SERPL-SCNC: 104 MMOL/L (ref 98–107)
CHOLEST SERPL-MCNC: 135 MG/DL
CREAT SERPL-MCNC: 1.04 MG/DL (ref 0.51–0.95)
EGFRCR SERPLBLD CKD-EPI 2021: 59 ML/MIN/1.73M2
FASTING STATUS PATIENT QL REPORTED: ABNORMAL
FASTING STATUS PATIENT QL REPORTED: NORMAL
GLUCOSE SERPL-MCNC: 110 MG/DL (ref 70–99)
HCO3 SERPL-SCNC: 24 MMOL/L (ref 22–29)
HDLC SERPL-MCNC: 68 MG/DL
LDLC SERPL CALC-MCNC: 53 MG/DL
NONHDLC SERPL-MCNC: 67 MG/DL
POTASSIUM SERPL-SCNC: 4.7 MMOL/L (ref 3.4–5.3)
PROT SERPL-MCNC: 7.4 G/DL (ref 6.4–8.3)
SODIUM SERPL-SCNC: 139 MMOL/L (ref 135–145)
T4 FREE SERPL-MCNC: 1.11 NG/DL (ref 0.9–1.7)
TRIGL SERPL-MCNC: 71 MG/DL
TSH SERPL DL<=0.005 MIU/L-ACNC: 2.6 UIU/ML (ref 0.3–4.2)

## 2024-09-20 PROCEDURE — 84443 ASSAY THYROID STIM HORMONE: CPT | Mod: ORL | Performed by: FAMILY MEDICINE

## 2024-09-20 PROCEDURE — 80053 COMPREHEN METABOLIC PANEL: CPT | Mod: ORL | Performed by: FAMILY MEDICINE

## 2024-09-20 PROCEDURE — 80061 LIPID PANEL: CPT | Mod: ORL | Performed by: FAMILY MEDICINE

## 2024-09-20 PROCEDURE — 84439 ASSAY OF FREE THYROXINE: CPT | Mod: ORL | Performed by: FAMILY MEDICINE

## 2025-01-30 DIAGNOSIS — I73.9 PERIPHERAL VASCULAR DISEASE: ICD-10-CM

## 2025-02-03 RX ORDER — CILOSTAZOL 100 MG/1
100 TABLET ORAL 2 TIMES DAILY
Qty: 180 TABLET | Refills: 3 | Status: SHIPPED | OUTPATIENT
Start: 2025-02-03

## 2025-02-05 ENCOUNTER — OFFICE VISIT (OUTPATIENT)
Dept: CARDIOLOGY | Facility: CLINIC | Age: 70
End: 2025-02-05
Attending: INTERNAL MEDICINE
Payer: COMMERCIAL

## 2025-02-05 VITALS
WEIGHT: 164 LBS | SYSTOLIC BLOOD PRESSURE: 116 MMHG | BODY MASS INDEX: 28.15 KG/M2 | HEART RATE: 112 BPM | RESPIRATION RATE: 14 BRPM | DIASTOLIC BLOOD PRESSURE: 56 MMHG

## 2025-02-05 DIAGNOSIS — I73.9 PERIPHERAL VASCULAR DISEASE: ICD-10-CM

## 2025-02-05 DIAGNOSIS — E78.00 PURE HYPERCHOLESTEROLEMIA: ICD-10-CM

## 2025-02-05 DIAGNOSIS — I10 BENIGN ESSENTIAL HYPERTENSION: ICD-10-CM

## 2025-02-05 DIAGNOSIS — I65.23 BILATERAL CAROTID ARTERY STENOSIS: ICD-10-CM

## 2025-02-05 DIAGNOSIS — F17.200 SMOKING ADDICTION: ICD-10-CM

## 2025-02-05 DIAGNOSIS — I25.83 CORONARY ARTERIOSCLEROSIS DUE TO LIPID RICH PLAQUE: Primary | ICD-10-CM

## 2025-02-05 PROCEDURE — 99214 OFFICE O/P EST MOD 30 MIN: CPT | Performed by: INTERNAL MEDICINE

## 2025-02-05 PROCEDURE — G2211 COMPLEX E/M VISIT ADD ON: HCPCS | Performed by: INTERNAL MEDICINE

## 2025-02-05 RX ORDER — AMLODIPINE AND BENAZEPRIL HYDROCHLORIDE 5; 20 MG/1; MG/1
1 CAPSULE ORAL DAILY
COMMUNITY
Start: 2025-01-09

## 2025-02-05 RX ORDER — BUPROPION HYDROCHLORIDE 300 MG/1
300 TABLET ORAL EVERY MORNING
COMMUNITY
Start: 2024-12-14

## 2025-02-05 RX ORDER — CALCIPOTRIENE 50 UG/G
OINTMENT TOPICAL DAILY
COMMUNITY
Start: 2024-11-06

## 2025-02-05 RX ORDER — CLOPIDOGREL BISULFATE 75 MG/1
1 TABLET ORAL
COMMUNITY
Start: 2025-01-09

## 2025-02-05 NOTE — PROGRESS NOTES
[facility]  Heart Care Clinic Follow-up Note    Assessment & Plan        (I25.10,  I25.83) Coronary arteriosclerosis due to lipid rich plaque  (primary encounter diagnosis)  Comment: Due to abnormal nuclear stress test angiography showed normal left main, LAD with distal 30 to 40% stenosis, normal nondominant circumflex and normal dominant right coronary artery.  No symptoms, need to work on prevention.       (I73.9) Peripheral vascular disease (H)  Comment: Based on noninvasive testing left lower extremity has unremarkable common iliac, external iliac, common femoral but distal superficial femoral artery significant stenosis.  Patent popliteal and patent infrapopliteal vessels.  Right side has unremarkable common iliac, external iliac, common femoral with patent popliteal, posterior tibial and anterior tibial, and dorsalis pedis.  She had further evaluation yesterday at [facility] radiology, is on Pletal, Plavix as well as aspirin, claudication does not start until after 10 minutes of walking.      (I65.23) Bilateral carotid artery stenosis  Comment: Based on carotid ultrasound from 2023 less than 50% stenosis bilaterally.      (F17.200) Smoking addiction  Comment: She has resumed smoking, she knows she needs to stop, she will work on it.     (E78.00) Pure hypercholesterolemia  Comment: Total cholesterol 135 with an LDL of 53 which is excellent.      (I10) Benign essential hypertension  Comment: Her primary is switched her from amlodipine over to amlodipine/benazepril and is under good control today.  Minimal orthostasis.      Plan  1.  Stop smoking.  2.  If she has worsening shortness of breath or chest discomfort she will let us know for further ischemic evaluation.  3.  Follow-up with me in 1 year or sooner if needed.    The longitudinal plan of care for the diagnosis(es)/condition(s) as documented were addressed during this visit. Due to the added complexity in care, I will continue to support Amirah in  the subsequent management and with ongoing continuity of care.     Subjective  CC: 69-year-old white female here for yearly follow-up.  Since I seen her her primary switch her amlodipine over to amlodipine/benazepril for blood pressure management.  In addition, she had intervention on her peripheral vascular arteries of the lower extremity yesterday.  She comes in today with only claudication after 10 minutes of walking, very rare orthostasis, shortness of breath on heavy activity, but no baseline shortness of breath, PND, orthopnea, chest pains, palpitations, significant syncope or presyncope or peripheral edema.    Medications  Current Outpatient Medications   Medication Sig Dispense Refill    amLODIPine-benazepril (LOTREL) 5-20 MG capsule Take 1 capsule by mouth daily.      Ascorbic Acid (VITAMIN C PO) Take 500 mg by mouth daily      aspirin 81 MG EC tablet [ASPIRIN 81 MG EC TABLET] Take 81 mg by mouth daily.      buPROPion (WELLBUTRIN XL) 300 MG 24 hr tablet Take 300 mg by mouth every morning.      calcipotriene (DOVONOX) 0.005 % external ointment Apply topically daily.      calcium carbonate (OS-KATELYN) 1500 (600 Ca) MG tablet Take 1,200 mg by mouth daily      cholecalciferol, vitamin D3, (D3-50 CHOLECALCIFEROL) 50,000 unit capsule [CHOLECALCIFEROL, VITAMIN D3, (D3-50 CHOLECALCIFEROL) 50,000 UNIT CAPSULE] Take 2,000 Units by mouth daily.             cilostazol (PLETAL) 100 MG tablet TAKE 1 TABLET TWICE A  tablet 3    clopidogrel (PLAVIX) 75 MG tablet Take 1 tablet by mouth daily at 2 pm.      co-enzyme Q-10 30 mg capsule [CO-ENZYME Q-10 30 MG CAPSULE] Take 200 mg by mouth daily.             rosuvastatin (CRESTOR) 20 MG tablet Take 1 tablet (20 mg) by mouth daily 90 tablet 0    VITAMIN E PO Take 1 tablet by mouth daily 400 units         Objective  /56 (BP Location: Left arm, Patient Position: Sitting, Cuff Size: Adult Regular)   Pulse 112   Resp 14   Wt 74.4 kg (164 lb)   BMI 28.15 kg/m   "    General Appearance:    Alert, cooperative, no distress, appears stated age   Head:    Normocephalic, without obvious abnormality, atraumatic   Throat:   Lips, mucosa, and tongue normal; teeth and gums normal   Neck:   Supple, symmetrical, trachea midline, no adenopathy;        thyroid:  No enlargement/tenderness/nodules; no carotid    bruit or JVD   Back:     Symmetric, no curvature, ROM normal, no CVA tenderness   Lungs:     Clear to auscultation bilaterally, respirations unlabored   Chest wall:    No tenderness or deformity   Heart:    Regular rate and rhythm, S1 and S2 normal, no murmur, rub   or gallop   Abdomen:     Soft, non-tender, bowel sounds active all four quadrants,     no masses, no organomegaly   Extremities:   Normal, atraumatic, no cyanosis or edema   Pulses:   2+ and symmetric all upper extremities, +1 lower extremities   Skin:   Skin color, texture, turgor normal, no rashes or lesions     Results    Lab Results personally reviewed   Lab Results   Component Value Date    CHOL 135 09/20/2024    CHOL 138 06/21/2023     Lab Results   Component Value Date    HDL 68 09/20/2024    HDL 62 06/21/2023     No components found for: \"LDLCALC\"  Lab Results   Component Value Date    TRIG 71 09/20/2024    TRIG 72 06/21/2023     Lab Results   Component Value Date    WBC 13.5 (H) 03/02/2024    HGB 12.0 03/02/2024    HCT 36.2 03/02/2024     03/02/2024     Lab Results   Component Value Date    BUN 18.0 09/20/2024     09/20/2024    CO2 24 09/20/2024         "

## 2025-02-05 NOTE — LETTER
2/5/2025    Hector Luu MD  [address] [place] [facility] [address]    RE: Amirah Oakley       Dear Colleague,     I had the pleasure of seeing Amirah Oakley in the [facility].      [facility]  Heart Care Clinic Follow-up Note    Assessment & Plan        (I25.10,  I25.83) Coronary arteriosclerosis due to lipid rich plaque  (primary encounter diagnosis)  Comment: Due to abnormal nuclear stress test angiography showed normal left main, LAD with distal 30 to 40% stenosis, normal nondominant circumflex and normal dominant right coronary artery.  No symptoms, need to work on prevention.       (I73.9) Peripheral vascular disease (H)  Comment: Based on noninvasive testing left lower extremity has unremarkable common iliac, external iliac, common femoral but distal superficial femoral artery significant stenosis.  Patent popliteal and patent infrapopliteal vessels.  Right side has unremarkable common iliac, external iliac, common femoral with patent popliteal, posterior tibial and anterior tibial, and dorsalis pedis.  She had further evaluation yesterday at [facility], is on Pletal, Plavix as well as aspirin, claudication does not start until after 10 minutes of walking.      (I65.23) Bilateral carotid artery stenosis  Comment: Based on carotid ultrasound from 2023 less than 50% stenosis bilaterally.      (F17.200) Smoking addiction  Comment: She has resumed smoking, she knows she needs to stop, she will work on it.     (E78.00) Pure hypercholesterolemia  Comment: Total cholesterol 135 with an LDL of 53 which is excellent.      (I10) Benign essential hypertension  Comment: Her primary is switched her from amlodipine over to amlodipine/benazepril and is under good control today.  Minimal orthostasis.      Plan  1.  Stop smoking.  2.  If she has worsening shortness of breath or chest discomfort she will let us know for further ischemic evaluation.  3.  Follow-up with  me in 1 year or sooner if needed.    The longitudinal plan of care for the diagnosis(es)/condition(s) as documented were addressed during this visit. Due to the added complexity in care, I will continue to support Amirah in the subsequent management and with ongoing continuity of care.     Subjective  CC: 69-year-old white female here for yearly follow-up.  Since I seen her her primary switch her amlodipine over to amlodipine/benazepril for blood pressure management.  In addition, she had intervention on her peripheral vascular arteries of the lower extremity yesterday.  She comes in today with only claudication after 10 minutes of walking, very rare orthostasis, shortness of breath on heavy activity, but no baseline shortness of breath, PND, orthopnea, chest pains, palpitations, significant syncope or presyncope or peripheral edema.    Medications  Current Outpatient Medications   Medication Sig Dispense Refill     amLODIPine-benazepril (LOTREL) 5-20 MG capsule Take 1 capsule by mouth daily.       Ascorbic Acid (VITAMIN C PO) Take 500 mg by mouth daily       aspirin 81 MG EC tablet [ASPIRIN 81 MG EC TABLET] Take 81 mg by mouth daily.       buPROPion (WELLBUTRIN XL) 300 MG 24 hr tablet Take 300 mg by mouth every morning.       calcipotriene (DOVONOX) 0.005 % external ointment Apply topically daily.       calcium carbonate (OS-KATELYN) 1500 (600 Ca) MG tablet Take 1,200 mg by mouth daily       cholecalciferol, vitamin D3, (D3-50 CHOLECALCIFEROL) 50,000 unit capsule [CHOLECALCIFEROL, VITAMIN D3, (D3-50 CHOLECALCIFEROL) 50,000 UNIT CAPSULE] Take 2,000 Units by mouth daily.              cilostazol (PLETAL) 100 MG tablet TAKE 1 TABLET TWICE A  tablet 3     clopidogrel (PLAVIX) 75 MG tablet Take 1 tablet by mouth daily at 2 pm.       co-enzyme Q-10 30 mg capsule [CO-ENZYME Q-10 30 MG CAPSULE] Take 200 mg by mouth daily.              rosuvastatin (CRESTOR) 20 MG tablet Take 1 tablet (20 mg) by mouth daily 90 tablet 0  "    VITAMIN E PO Take 1 tablet by mouth daily 400 units         Objective  /56 (BP Location: Left arm, Patient Position: Sitting, Cuff Size: Adult Regular)   Pulse 112   Resp 14   Wt 74.4 kg (164 lb)   BMI 28.15 kg/m      General Appearance:    Alert, cooperative, no distress, appears stated age   Head:    Normocephalic, without obvious abnormality, atraumatic   Throat:   Lips, mucosa, and tongue normal; teeth and gums normal   Neck:   Supple, symmetrical, trachea midline, no adenopathy;        thyroid:  No enlargement/tenderness/nodules; no carotid    bruit or JVD   Back:     Symmetric, no curvature, ROM normal, no CVA tenderness   Lungs:     Clear to auscultation bilaterally, respirations unlabored   Chest wall:    No tenderness or deformity   Heart:    Regular rate and rhythm, S1 and S2 normal, no murmur, rub   or gallop   Abdomen:     Soft, non-tender, bowel sounds active all four quadrants,     no masses, no organomegaly   Extremities:   Normal, atraumatic, no cyanosis or edema   Pulses:   2+ and symmetric all upper extremities, +1 lower extremities   Skin:   Skin color, texture, turgor normal, no rashes or lesions     Results    Lab Results personally reviewed   Lab Results   Component Value Date    CHOL 135 09/20/2024    CHOL 138 06/21/2023     Lab Results   Component Value Date    HDL 68 09/20/2024    HDL 62 06/21/2023     No components found for: \"LDLCALC\"  Lab Results   Component Value Date    TRIG 71 09/20/2024    TRIG 72 06/21/2023     Lab Results   Component Value Date    WBC 13.5 (H) 03/02/2024    HGB 12.0 03/02/2024    HCT 36.2 03/02/2024     03/02/2024     Lab Results   Component Value Date    BUN 18.0 09/20/2024     09/20/2024    CO2 24 09/20/2024             Thank you for allowing me to participate in the care of your patient.      Sincerely,     ROSSY BENSON MD     [facility]  cc:   Erica Benson MD  [address] [place] [address]" [address], [address]  [place] [address]

## 2025-02-05 NOTE — PATIENT INSTRUCTIONS
Ms Amirah Oakley,  I enjoyed visiting with you again today.  I am glad to hear you are doing well.  Per our conversation if the breathing gets worse all of a sudden or you get chest pains or the bruising gets really let me know.  I will plan on seeing you 1 year.  Esteban Benson

## 2025-03-03 ENCOUNTER — LAB REQUISITION (OUTPATIENT)
Dept: LAB | Facility: CLINIC | Age: 70
End: 2025-03-03
Payer: COMMERCIAL

## 2025-03-03 DIAGNOSIS — L30.9 DERMATITIS, UNSPECIFIED: ICD-10-CM

## 2025-03-03 PROCEDURE — 87070 CULTURE OTHR SPECIMN AEROBIC: CPT | Performed by: FAMILY MEDICINE

## 2025-03-03 PROCEDURE — 87070 CULTURE OTHR SPECIMN AEROBIC: CPT | Mod: ORL | Performed by: FAMILY MEDICINE

## 2025-03-05 LAB
BACTERIA SKIN AEROBE CULT: ABNORMAL
BACTERIA SKIN AEROBE CULT: ABNORMAL

## 2025-08-25 ENCOUNTER — APPOINTMENT (OUTPATIENT)
Dept: CT IMAGING | Facility: HOSPITAL | Age: 70
End: 2025-08-25
Attending: EMERGENCY MEDICINE
Payer: COMMERCIAL

## 2025-08-25 LAB
ALBUMIN SERPL BCG-MCNC: 4.5 G/DL (ref 3.5–5.2)
ALBUMIN UR-MCNC: NEGATIVE MG/DL
ALP SERPL-CCNC: 85 U/L (ref 40–150)
ALT SERPL W P-5'-P-CCNC: 25 U/L (ref 0–50)
ANION GAP SERPL CALCULATED.3IONS-SCNC: 13 MMOL/L (ref 7–15)
APPEARANCE UR: CLEAR
AST SERPL W P-5'-P-CCNC: 25 U/L (ref 0–45)
BASOPHILS # BLD AUTO: 0.06 10E3/UL (ref 0–0.2)
BASOPHILS NFR BLD AUTO: 0.6 %
BILIRUB DIRECT SERPL-MCNC: 0.08 MG/DL (ref 0–0.3)
BILIRUB SERPL-MCNC: 0.2 MG/DL
BILIRUB UR QL STRIP: NEGATIVE
BUN SERPL-MCNC: 14.2 MG/DL (ref 8–23)
CALCIUM SERPL-MCNC: 9.6 MG/DL (ref 8.8–10.4)
CHLORIDE SERPL-SCNC: 102 MMOL/L (ref 98–107)
COLOR UR AUTO: COLORLESS
CREAT SERPL-MCNC: 0.94 MG/DL (ref 0.51–0.95)
EGFRCR SERPLBLD CKD-EPI 2021: 66 ML/MIN/1.73M2
EOSINOPHIL # BLD AUTO: 0.24 10E3/UL (ref 0–0.7)
EOSINOPHIL NFR BLD AUTO: 2.3 %
ERYTHROCYTE [DISTWIDTH] IN BLOOD BY AUTOMATED COUNT: 13.6 % (ref 10–15)
GLUCOSE SERPL-MCNC: 99 MG/DL (ref 70–99)
GLUCOSE UR STRIP-MCNC: NEGATIVE MG/DL
HCO3 SERPL-SCNC: 23 MMOL/L (ref 22–29)
HCT VFR BLD AUTO: 38.2 % (ref 35–47)
HGB BLD-MCNC: 12.2 G/DL (ref 11.7–15.7)
HGB UR QL STRIP: NEGATIVE
IMM GRANULOCYTES # BLD: 0.04 10E3/UL
IMM GRANULOCYTES NFR BLD: 0.4 %
KETONES UR STRIP-MCNC: NEGATIVE MG/DL
LEUKOCYTE ESTERASE UR QL STRIP: NEGATIVE
LIPASE SERPL-CCNC: 21 U/L (ref 13–60)
LYMPHOCYTES # BLD AUTO: 2.43 10E3/UL (ref 0.8–5.3)
LYMPHOCYTES NFR BLD AUTO: 23.4 %
MAGNESIUM SERPL-MCNC: 1.9 MG/DL (ref 1.7–2.3)
MCH RBC QN AUTO: 29.1 PG (ref 26.5–33)
MCHC RBC AUTO-ENTMCNC: 31.9 G/DL (ref 31.5–36.5)
MCV RBC AUTO: 91.2 FL (ref 78–100)
MONOCYTES # BLD AUTO: 0.95 10E3/UL (ref 0–1.3)
MONOCYTES NFR BLD AUTO: 9.2 %
NEUTROPHILS # BLD AUTO: 6.65 10E3/UL (ref 1.6–8.3)
NEUTROPHILS NFR BLD AUTO: 64.1 %
NITRATE UR QL: NEGATIVE
NRBC # BLD AUTO: <0.03 10E3/UL
NRBC BLD AUTO-RTO: 0 /100
PH UR STRIP: 6 [PH] (ref 5–7)
PLATELET # BLD AUTO: 352 10E3/UL (ref 150–450)
POTASSIUM SERPL-SCNC: 4.5 MMOL/L (ref 3.4–5.3)
PROT SERPL-MCNC: 7.6 G/DL (ref 6.4–8.3)
RBC # BLD AUTO: 4.19 10E6/UL (ref 3.8–5.2)
RBC URINE: 0 /HPF
SODIUM SERPL-SCNC: 138 MMOL/L (ref 135–145)
SP GR UR STRIP: 1.02 (ref 1–1.03)
SQUAMOUS EPITHELIAL: 1 /HPF
UROBILINOGEN UR STRIP-MCNC: NORMAL MG/DL
WBC # BLD AUTO: 10.37 10E3/UL (ref 4–11)
WBC URINE: 1 /HPF

## 2025-08-25 PROCEDURE — 255N000002 HC RX 255 OP 636: Performed by: EMERGENCY MEDICINE

## 2025-08-25 PROCEDURE — 83690 ASSAY OF LIPASE: CPT | Performed by: EMERGENCY MEDICINE

## 2025-08-25 PROCEDURE — 81001 URINALYSIS AUTO W/SCOPE: CPT | Performed by: EMERGENCY MEDICINE

## 2025-08-25 PROCEDURE — 82947 ASSAY GLUCOSE BLOOD QUANT: CPT | Performed by: EMERGENCY MEDICINE

## 2025-08-25 PROCEDURE — 84155 ASSAY OF PROTEIN SERUM: CPT | Performed by: EMERGENCY MEDICINE

## 2025-08-25 PROCEDURE — 83735 ASSAY OF MAGNESIUM: CPT | Performed by: EMERGENCY MEDICINE

## 2025-08-25 PROCEDURE — 85025 COMPLETE CBC W/AUTO DIFF WBC: CPT | Performed by: EMERGENCY MEDICINE

## 2025-08-25 PROCEDURE — 99285 EMERGENCY DEPT VISIT HI MDM: CPT | Mod: 25 | Performed by: STUDENT IN AN ORGANIZED HEALTH CARE EDUCATION/TRAINING PROGRAM

## 2025-08-25 PROCEDURE — 74177 CT ABD & PELVIS W/CONTRAST: CPT

## 2025-08-25 PROCEDURE — 36415 COLL VENOUS BLD VENIPUNCTURE: CPT | Performed by: EMERGENCY MEDICINE

## 2025-08-25 RX ORDER — CYCLOBENZAPRINE HCL 10 MG
10 TABLET ORAL 3 TIMES DAILY PRN
Qty: 21 TABLET | Refills: 0 | Status: SHIPPED | OUTPATIENT
Start: 2025-08-25 | End: 2025-08-25

## 2025-08-25 RX ORDER — CYCLOBENZAPRINE HCL 10 MG
10 TABLET ORAL 3 TIMES DAILY PRN
Qty: 21 TABLET | Refills: 0 | Status: SHIPPED | OUTPATIENT
Start: 2025-08-25

## 2025-08-25 RX ADMIN — IOHEXOL 85 ML: 350 INJECTION, SOLUTION INTRAVENOUS at 23:18

## 2025-08-25 ASSESSMENT — COLUMBIA-SUICIDE SEVERITY RATING SCALE - C-SSRS
1. IN THE PAST MONTH, HAVE YOU WISHED YOU WERE DEAD OR WISHED YOU COULD GO TO SLEEP AND NOT WAKE UP?: NO
2. HAVE YOU ACTUALLY HAD ANY THOUGHTS OF KILLING YOURSELF IN THE PAST MONTH?: NO
6. HAVE YOU EVER DONE ANYTHING, STARTED TO DO ANYTHING, OR PREPARED TO DO ANYTHING TO END YOUR LIFE?: NO

## 2025-08-26 ENCOUNTER — HOSPITAL ENCOUNTER (EMERGENCY)
Facility: HOSPITAL | Age: 70
Discharge: HOME | End: 2025-08-26
Attending: STUDENT IN AN ORGANIZED HEALTH CARE EDUCATION/TRAINING PROGRAM | Admitting: STUDENT IN AN ORGANIZED HEALTH CARE EDUCATION/TRAINING PROGRAM
Payer: COMMERCIAL

## 2025-08-26 VITALS
TEMPERATURE: 97.9 F | SYSTOLIC BLOOD PRESSURE: 174 MMHG | OXYGEN SATURATION: 95 % | DIASTOLIC BLOOD PRESSURE: 75 MMHG | HEART RATE: 74 BPM | WEIGHT: 164 LBS | RESPIRATION RATE: 16 BRPM | BODY MASS INDEX: 29.06 KG/M2 | HEIGHT: 63 IN

## 2025-08-26 DIAGNOSIS — R10.32 ABDOMINAL PAIN, LEFT LOWER QUADRANT: Primary | ICD-10-CM

## (undated) DEVICE — CUSTOM PACK CORONARY SAN5BCRHEA

## (undated) DEVICE — KIT HAND CONTROL ACIST 016795

## (undated) DEVICE — CATH GUIDING 5FR X 100CM LA5PAPA

## (undated) DEVICE — KIT HAND CONTROL ACIST 014644 AR-P54

## (undated) DEVICE — ELECTRODE ADULT PACING MULTI P-211-M1

## (undated) DEVICE — MANIFOLD KIT ANGIO AUTOMATED 014613

## (undated) DEVICE — SLEEVE TR BAND RADIAL COMPRESSION DEVICE 24CM TRB24-REG

## (undated) DEVICE — INTRO MICRO MINI STICK 4FR STIFF NITINOL 45-753

## (undated) DEVICE — SYR ANGIOGRAPHY MULTIUSE KIT ACIST 014612

## (undated) DEVICE — SHEATH GLIDE RADIAL 5FR 10CM .021

## (undated) RX ORDER — DIAZEPAM 10 MG
TABLET ORAL
Status: DISPENSED
Start: 2022-07-11

## (undated) RX ORDER — LIDOCAINE HYDROCHLORIDE 10 MG/ML
INJECTION, SOLUTION INFILTRATION; PERINEURAL
Status: DISPENSED
Start: 2022-07-11

## (undated) RX ORDER — HEPARIN SODIUM 1000 [USP'U]/ML
INJECTION, SOLUTION INTRAVENOUS; SUBCUTANEOUS
Status: DISPENSED
Start: 2022-07-11

## (undated) RX ORDER — FENTANYL CITRATE 50 UG/ML
INJECTION, SOLUTION INTRAMUSCULAR; INTRAVENOUS
Status: DISPENSED
Start: 2022-07-11